# Patient Record
Sex: MALE | Race: WHITE | NOT HISPANIC OR LATINO | Employment: OTHER | URBAN - METROPOLITAN AREA
[De-identification: names, ages, dates, MRNs, and addresses within clinical notes are randomized per-mention and may not be internally consistent; named-entity substitution may affect disease eponyms.]

---

## 2018-09-24 ENCOUNTER — ANESTHESIA EVENT (OUTPATIENT)
Dept: PERIOP | Facility: HOSPITAL | Age: 63
End: 2018-09-24

## 2018-09-24 ENCOUNTER — APPOINTMENT (OUTPATIENT)
Dept: PREADMISSION TESTING | Facility: HOSPITAL | Age: 63
End: 2018-09-24

## 2018-09-24 VITALS — HEIGHT: 68 IN | WEIGHT: 210.98 LBS | BODY MASS INDEX: 31.98 KG/M2

## 2018-09-24 LAB
DEPRECATED RDW RBC AUTO: 49.9 FL (ref 37–54)
ERYTHROCYTE [DISTWIDTH] IN BLOOD BY AUTOMATED COUNT: 14.6 % (ref 11.3–14.5)
HCT VFR BLD AUTO: 35.6 % (ref 38.9–50.9)
HGB BLD-MCNC: 11.2 G/DL (ref 13.1–17.5)
INR PPP: 1.2 (ref 0.91–1.09)
MCH RBC QN AUTO: 29.5 PG (ref 27–31)
MCHC RBC AUTO-ENTMCNC: 31.5 G/DL (ref 32–36)
MCV RBC AUTO: 93.7 FL (ref 80–99)
PLATELET # BLD AUTO: 159 10*3/MM3 (ref 150–450)
PMV BLD AUTO: 10.9 FL (ref 6–12)
POTASSIUM BLD-SCNC: 4.4 MMOL/L (ref 3.5–5.5)
PROTHROMBIN TIME: 12.6 SECONDS (ref 9.6–11.5)
RBC # BLD AUTO: 3.8 10*6/MM3 (ref 4.2–5.76)
WBC NRBC COR # BLD: 5.21 10*3/MM3 (ref 3.5–10.8)

## 2018-09-24 PROCEDURE — 85610 PROTHROMBIN TIME: CPT | Performed by: ORTHOPAEDIC SURGERY

## 2018-09-24 PROCEDURE — 85027 COMPLETE CBC AUTOMATED: CPT | Performed by: ANESTHESIOLOGY

## 2018-09-24 PROCEDURE — 93005 ELECTROCARDIOGRAM TRACING: CPT

## 2018-09-24 PROCEDURE — 84132 ASSAY OF SERUM POTASSIUM: CPT | Performed by: ANESTHESIOLOGY

## 2018-09-24 PROCEDURE — 93010 ELECTROCARDIOGRAM REPORT: CPT | Performed by: INTERNAL MEDICINE

## 2018-09-24 PROCEDURE — 36415 COLL VENOUS BLD VENIPUNCTURE: CPT

## 2018-09-24 RX ORDER — TIZANIDINE 4 MG/1
4 TABLET ORAL 3 TIMES DAILY PRN
COMMUNITY

## 2018-09-24 RX ORDER — ASPIRIN 81 MG/1
81 TABLET ORAL DAILY
COMMUNITY

## 2018-09-24 RX ORDER — LISINOPRIL 10 MG/1
10 TABLET ORAL DAILY
COMMUNITY
End: 2019-06-12 | Stop reason: SDUPTHER

## 2018-09-24 RX ORDER — PANTOPRAZOLE SODIUM 40 MG/1
40 TABLET, DELAYED RELEASE ORAL DAILY
COMMUNITY
End: 2019-06-12 | Stop reason: SDUPTHER

## 2018-09-24 RX ORDER — METHADONE HYDROCHLORIDE 10 MG/1
30 TABLET ORAL EVERY 6 HOURS PRN
COMMUNITY

## 2018-09-24 RX ORDER — GABAPENTIN 600 MG/1
600 TABLET ORAL 4 TIMES DAILY
Status: ON HOLD | COMMUNITY
End: 2019-12-27

## 2018-09-24 RX ORDER — ATORVASTATIN CALCIUM 80 MG/1
80 TABLET, FILM COATED ORAL NIGHTLY
COMMUNITY
End: 2019-06-12 | Stop reason: SDUPTHER

## 2018-09-24 RX ORDER — WARFARIN SODIUM 7.5 MG/1
7.5 TABLET ORAL
Status: ON HOLD | COMMUNITY
End: 2018-09-26

## 2018-09-24 RX ORDER — WARFARIN SODIUM 4 MG/1
4 TABLET ORAL
Status: ON HOLD | COMMUNITY
End: 2018-09-26

## 2018-09-24 RX ORDER — FAMOTIDINE 10 MG/ML
20 INJECTION, SOLUTION INTRAVENOUS ONCE
Status: CANCELLED | OUTPATIENT
Start: 2018-09-24 | End: 2018-09-24

## 2018-09-25 ENCOUNTER — ANESTHESIA (OUTPATIENT)
Dept: PERIOP | Facility: HOSPITAL | Age: 63
End: 2018-09-25

## 2018-09-25 ENCOUNTER — HOSPITAL ENCOUNTER (INPATIENT)
Facility: HOSPITAL | Age: 63
LOS: 1 days | Discharge: HOME | End: 2018-09-26
Attending: ORTHOPAEDIC SURGERY | Admitting: ORTHOPAEDIC SURGERY

## 2018-09-25 ENCOUNTER — APPOINTMENT (OUTPATIENT)
Dept: GENERAL RADIOLOGY | Facility: HOSPITAL | Age: 63
End: 2018-09-25

## 2018-09-25 PROBLEM — M50.11 DISORDER OF INTERVERTEBRAL DISC OF HIGH CERVICAL REGION WITH RADICULOPATHY: Status: ACTIVE | Noted: 2018-09-25

## 2018-09-25 PROBLEM — E78.5 HLD (HYPERLIPIDEMIA): Status: ACTIVE | Noted: 2018-09-25

## 2018-09-25 PROBLEM — M54.2 NECK PAIN: Status: ACTIVE | Noted: 2018-09-25

## 2018-09-25 PROBLEM — I10 HTN (HYPERTENSION): Status: ACTIVE | Noted: 2018-09-25

## 2018-09-25 PROBLEM — D64.9 ANEMIA: Status: ACTIVE | Noted: 2018-09-25

## 2018-09-25 PROBLEM — J44.9 COPD (CHRONIC OBSTRUCTIVE PULMONARY DISEASE) (HCC): Status: ACTIVE | Noted: 2018-09-25

## 2018-09-25 PROBLEM — G47.33 OSA TREATED WITH BIPAP: Status: ACTIVE | Noted: 2018-09-25

## 2018-09-25 PROBLEM — Z86.718 HISTORY OF DVT (DEEP VEIN THROMBOSIS): Status: ACTIVE | Noted: 2018-09-25

## 2018-09-25 PROBLEM — Z98.1 S/P CERVICAL SPINAL FUSION: Status: ACTIVE | Noted: 2018-09-25

## 2018-09-25 PROBLEM — E11.9 DM (DIABETES MELLITUS) (HCC): Status: ACTIVE | Noted: 2018-09-25

## 2018-09-25 LAB
GLUCOSE BLDC GLUCOMTR-MCNC: 124 MG/DL (ref 70–130)
GLUCOSE BLDC GLUCOMTR-MCNC: 128 MG/DL (ref 70–130)
GLUCOSE BLDC GLUCOMTR-MCNC: 198 MG/DL (ref 70–130)
GLUCOSE BLDC GLUCOMTR-MCNC: 205 MG/DL (ref 70–130)
HBA1C MFR BLD: 7.6 % (ref 4.8–5.6)
INR PPP: 1.13 (ref 0.91–1.09)
PROTHROMBIN TIME: 11.9 SECONDS (ref 9.6–11.5)

## 2018-09-25 PROCEDURE — 25010000003 CEFAZOLIN IN DEXTROSE 2-4 GM/100ML-% SOLUTION: Performed by: ORTHOPAEDIC SURGERY

## 2018-09-25 PROCEDURE — 25810000003 SODIUM CHLORIDE 0.9 % WITH KCL 20 MEQ 20-0.9 MEQ/L-% SOLUTION: Performed by: ORTHOPAEDIC SURGERY

## 2018-09-25 PROCEDURE — C1713 ANCHOR/SCREW BN/BN,TIS/BN: HCPCS | Performed by: ORTHOPAEDIC SURGERY

## 2018-09-25 PROCEDURE — 25010000002 PROPOFOL 10 MG/ML EMULSION: Performed by: ANESTHESIOLOGY

## 2018-09-25 PROCEDURE — 63710000001 DEXAMETHASONE PER 0.25 MG: Performed by: ORTHOPAEDIC SURGERY

## 2018-09-25 PROCEDURE — 25010000002 FENTANYL CITRATE (PF) 100 MCG/2ML SOLUTION: Performed by: ANESTHESIOLOGY

## 2018-09-25 PROCEDURE — L0120 CERV FLEX N/ADJ FOAM PRE OTS: HCPCS | Performed by: ORTHOPAEDIC SURGERY

## 2018-09-25 PROCEDURE — 63710000001 INSULIN LISPRO (HUMAN) PER 5 UNITS: Performed by: NURSE PRACTITIONER

## 2018-09-25 PROCEDURE — 0RG10A0 FUSION OF CERVICAL VERTEBRAL JOINT WITH INTERBODY FUSION DEVICE, ANTERIOR APPROACH, ANTERIOR COLUMN, OPEN APPROACH: ICD-10-PCS | Performed by: ORTHOPAEDIC SURGERY

## 2018-09-25 PROCEDURE — 82962 GLUCOSE BLOOD TEST: CPT

## 2018-09-25 PROCEDURE — 0RB30ZZ EXCISION OF CERVICAL VERTEBRAL DISC, OPEN APPROACH: ICD-10-PCS | Performed by: ORTHOPAEDIC SURGERY

## 2018-09-25 PROCEDURE — 25010000002 DEXAMETHASONE PER 1 MG: Performed by: ANESTHESIOLOGY

## 2018-09-25 PROCEDURE — 25010000002 PROPOFOL 1000 MG/ML EMULSION: Performed by: ANESTHESIOLOGY

## 2018-09-25 PROCEDURE — 25010000002 HYDROMORPHONE PER 4 MG: Performed by: ANESTHESIOLOGY

## 2018-09-25 PROCEDURE — 25010000002 HYDROMORPHONE PER 4 MG: Performed by: ORTHOPAEDIC SURGERY

## 2018-09-25 PROCEDURE — 25010000002 NEOSTIGMINE 10 MG/10ML SOLUTION: Performed by: ANESTHESIOLOGY

## 2018-09-25 PROCEDURE — 83036 HEMOGLOBIN GLYCOSYLATED A1C: CPT | Performed by: NURSE PRACTITIONER

## 2018-09-25 PROCEDURE — 72020 X-RAY EXAM OF SPINE 1 VIEW: CPT

## 2018-09-25 PROCEDURE — 85610 PROTHROMBIN TIME: CPT | Performed by: ORTHOPAEDIC SURGERY

## 2018-09-25 DEVICE — BENGAL SYSTEM LARGE IMPLANT 5MM, 7 DEGREES
Type: IMPLANTABLE DEVICE | Site: SPINE CERVICAL | Status: FUNCTIONAL
Brand: BENGAL

## 2018-09-25 DEVICE — ALLOGRFT BONE VIVIGEN CELLUAR MATRX FORMABLE 1CC: Type: IMPLANTABLE DEVICE | Site: SPINE CERVICAL | Status: FUNCTIONAL

## 2018-09-25 DEVICE — SCRW SKYLINE VAR SD 16MM: Type: IMPLANTABLE DEVICE | Site: SPINE CERVICAL | Status: FUNCTIONAL

## 2018-09-25 DEVICE — PLT SKYLINE 1LEVEL 14MM: Type: IMPLANTABLE DEVICE | Site: SPINE CERVICAL | Status: FUNCTIONAL

## 2018-09-25 RX ORDER — ONDANSETRON 2 MG/ML
4 INJECTION INTRAMUSCULAR; INTRAVENOUS ONCE AS NEEDED
Status: DISCONTINUED | OUTPATIENT
Start: 2018-09-25 | End: 2018-09-25 | Stop reason: HOSPADM

## 2018-09-25 RX ORDER — FAMOTIDINE 20 MG/1
20 TABLET, FILM COATED ORAL ONCE
Status: COMPLETED | OUTPATIENT
Start: 2018-09-25 | End: 2018-09-25

## 2018-09-25 RX ORDER — ATORVASTATIN CALCIUM 40 MG/1
80 TABLET, FILM COATED ORAL NIGHTLY
Status: DISCONTINUED | OUTPATIENT
Start: 2018-09-25 | End: 2018-09-26 | Stop reason: HOSPADM

## 2018-09-25 RX ORDER — SODIUM CHLORIDE, SODIUM LACTATE, POTASSIUM CHLORIDE, CALCIUM CHLORIDE 600; 310; 30; 20 MG/100ML; MG/100ML; MG/100ML; MG/100ML
9 INJECTION, SOLUTION INTRAVENOUS CONTINUOUS
Status: DISCONTINUED | OUTPATIENT
Start: 2018-09-25 | End: 2018-09-26 | Stop reason: HOSPADM

## 2018-09-25 RX ORDER — LABETALOL HYDROCHLORIDE 5 MG/ML
10 INJECTION, SOLUTION INTRAVENOUS EVERY 4 HOURS PRN
Status: DISCONTINUED | OUTPATIENT
Start: 2018-09-25 | End: 2018-09-26 | Stop reason: HOSPADM

## 2018-09-25 RX ORDER — CEFAZOLIN SODIUM 2 G/100ML
2 INJECTION, SOLUTION INTRAVENOUS EVERY 8 HOURS
Status: COMPLETED | OUTPATIENT
Start: 2018-09-25 | End: 2018-09-26

## 2018-09-25 RX ORDER — LISINOPRIL 10 MG/1
10 TABLET ORAL DAILY
Status: DISCONTINUED | OUTPATIENT
Start: 2018-09-26 | End: 2018-09-26 | Stop reason: HOSPADM

## 2018-09-25 RX ORDER — GLYCOPYRROLATE 0.2 MG/ML
INJECTION INTRAMUSCULAR; INTRAVENOUS AS NEEDED
Status: DISCONTINUED | OUTPATIENT
Start: 2018-09-25 | End: 2018-09-25 | Stop reason: SURG

## 2018-09-25 RX ORDER — PANTOPRAZOLE SODIUM 40 MG/1
40 TABLET, DELAYED RELEASE ORAL DAILY
Status: DISCONTINUED | OUTPATIENT
Start: 2018-09-25 | End: 2018-09-26 | Stop reason: HOSPADM

## 2018-09-25 RX ORDER — ZOLPIDEM TARTRATE 5 MG/1
5 TABLET ORAL NIGHTLY PRN
Status: DISCONTINUED | OUTPATIENT
Start: 2018-09-25 | End: 2018-09-26 | Stop reason: HOSPADM

## 2018-09-25 RX ORDER — ROCURONIUM BROMIDE 10 MG/ML
INJECTION, SOLUTION INTRAVENOUS AS NEEDED
Status: DISCONTINUED | OUTPATIENT
Start: 2018-09-25 | End: 2018-09-25 | Stop reason: SURG

## 2018-09-25 RX ORDER — GABAPENTIN 300 MG/1
600 CAPSULE ORAL 4 TIMES DAILY
Status: DISCONTINUED | OUTPATIENT
Start: 2018-09-25 | End: 2018-09-26 | Stop reason: HOSPADM

## 2018-09-25 RX ORDER — ACETAMINOPHEN 325 MG/1
650 TABLET ORAL EVERY 4 HOURS PRN
Status: DISCONTINUED | OUTPATIENT
Start: 2018-09-25 | End: 2018-09-26 | Stop reason: HOSPADM

## 2018-09-25 RX ORDER — BISACODYL 10 MG
10 SUPPOSITORY, RECTAL RECTAL DAILY PRN
Status: DISCONTINUED | OUTPATIENT
Start: 2018-09-25 | End: 2018-09-26 | Stop reason: HOSPADM

## 2018-09-25 RX ORDER — SODIUM CHLORIDE 0.9 % (FLUSH) 0.9 %
1-10 SYRINGE (ML) INJECTION AS NEEDED
Status: DISCONTINUED | OUTPATIENT
Start: 2018-09-25 | End: 2018-09-26 | Stop reason: HOSPADM

## 2018-09-25 RX ORDER — HYDROCODONE BITARTRATE AND ACETAMINOPHEN 7.5; 325 MG/1; MG/1
1 TABLET ORAL EVERY 4 HOURS PRN
Status: DISCONTINUED | OUTPATIENT
Start: 2018-09-25 | End: 2018-09-26 | Stop reason: HOSPADM

## 2018-09-25 RX ORDER — BENZOIN/ALOE VERA/STORAX/TOLU 10-2-8-4%
TINCTURE TOPICAL AS NEEDED
Status: DISCONTINUED | OUTPATIENT
Start: 2018-09-25 | End: 2018-09-25 | Stop reason: HOSPADM

## 2018-09-25 RX ORDER — BISACODYL 5 MG/1
5 TABLET, DELAYED RELEASE ORAL DAILY PRN
Status: DISCONTINUED | OUTPATIENT
Start: 2018-09-25 | End: 2018-09-26 | Stop reason: HOSPADM

## 2018-09-25 RX ORDER — PROMETHAZINE HYDROCHLORIDE 25 MG/1
25 TABLET ORAL ONCE AS NEEDED
Status: DISCONTINUED | OUTPATIENT
Start: 2018-09-25 | End: 2018-09-25 | Stop reason: HOSPADM

## 2018-09-25 RX ORDER — FENTANYL CITRATE 50 UG/ML
INJECTION, SOLUTION INTRAMUSCULAR; INTRAVENOUS AS NEEDED
Status: DISCONTINUED | OUTPATIENT
Start: 2018-09-25 | End: 2018-09-25 | Stop reason: SURG

## 2018-09-25 RX ORDER — HYDROMORPHONE HYDROCHLORIDE 1 MG/ML
0.5 INJECTION, SOLUTION INTRAMUSCULAR; INTRAVENOUS; SUBCUTANEOUS
Status: DISCONTINUED | OUTPATIENT
Start: 2018-09-25 | End: 2018-09-25 | Stop reason: HOSPADM

## 2018-09-25 RX ORDER — ONDANSETRON 2 MG/ML
4 INJECTION INTRAMUSCULAR; INTRAVENOUS EVERY 6 HOURS PRN
Status: DISCONTINUED | OUTPATIENT
Start: 2018-09-25 | End: 2018-09-26 | Stop reason: HOSPADM

## 2018-09-25 RX ORDER — FENTANYL CITRATE 50 UG/ML
50 INJECTION, SOLUTION INTRAMUSCULAR; INTRAVENOUS
Status: DISCONTINUED | OUTPATIENT
Start: 2018-09-25 | End: 2018-09-25 | Stop reason: HOSPADM

## 2018-09-25 RX ORDER — DEXAMETHASONE SODIUM PHOSPHATE 10 MG/ML
INJECTION INTRAMUSCULAR; INTRAVENOUS AS NEEDED
Status: DISCONTINUED | OUTPATIENT
Start: 2018-09-25 | End: 2018-09-25 | Stop reason: SURG

## 2018-09-25 RX ORDER — PROMETHAZINE HYDROCHLORIDE 25 MG/ML
6.25 INJECTION, SOLUTION INTRAMUSCULAR; INTRAVENOUS ONCE AS NEEDED
Status: DISCONTINUED | OUTPATIENT
Start: 2018-09-25 | End: 2018-09-25 | Stop reason: HOSPADM

## 2018-09-25 RX ORDER — SODIUM CHLORIDE AND POTASSIUM CHLORIDE 150; 900 MG/100ML; MG/100ML
100 INJECTION, SOLUTION INTRAVENOUS CONTINUOUS
Status: DISCONTINUED | OUTPATIENT
Start: 2018-09-25 | End: 2018-09-26 | Stop reason: HOSPADM

## 2018-09-25 RX ORDER — LIDOCAINE HYDROCHLORIDE 10 MG/ML
0.5 INJECTION, SOLUTION EPIDURAL; INFILTRATION; INTRACAUDAL; PERINEURAL ONCE AS NEEDED
Status: COMPLETED | OUTPATIENT
Start: 2018-09-25 | End: 2018-09-25

## 2018-09-25 RX ORDER — MAGNESIUM HYDROXIDE 1200 MG/15ML
LIQUID ORAL AS NEEDED
Status: DISCONTINUED | OUTPATIENT
Start: 2018-09-25 | End: 2018-09-25 | Stop reason: HOSPADM

## 2018-09-25 RX ORDER — DEXTROSE MONOHYDRATE 25 G/50ML
25 INJECTION, SOLUTION INTRAVENOUS
Status: DISCONTINUED | OUTPATIENT
Start: 2018-09-25 | End: 2018-09-26 | Stop reason: HOSPADM

## 2018-09-25 RX ORDER — DEXAMETHASONE SODIUM PHOSPHATE 4 MG/ML
4 INJECTION, SOLUTION INTRA-ARTICULAR; INTRALESIONAL; INTRAMUSCULAR; INTRAVENOUS; SOFT TISSUE EVERY 6 HOURS SCHEDULED
Status: DISCONTINUED | OUTPATIENT
Start: 2018-09-25 | End: 2018-09-26 | Stop reason: HOSPADM

## 2018-09-25 RX ORDER — CEFAZOLIN SODIUM 2 G/100ML
2 INJECTION, SOLUTION INTRAVENOUS ONCE
Status: DISCONTINUED | OUTPATIENT
Start: 2018-09-25 | End: 2018-09-25 | Stop reason: HOSPADM

## 2018-09-25 RX ORDER — NEOSTIGMINE METHYLSULFATE 1 MG/ML
INJECTION, SOLUTION INTRAVENOUS AS NEEDED
Status: DISCONTINUED | OUTPATIENT
Start: 2018-09-25 | End: 2018-09-25 | Stop reason: SURG

## 2018-09-25 RX ORDER — FAMOTIDINE 20 MG/1
20 TABLET, FILM COATED ORAL EVERY 12 HOURS SCHEDULED
Status: DISCONTINUED | OUTPATIENT
Start: 2018-09-25 | End: 2018-09-26 | Stop reason: HOSPADM

## 2018-09-25 RX ORDER — SODIUM CHLORIDE 0.9 % (FLUSH) 0.9 %
1-10 SYRINGE (ML) INJECTION AS NEEDED
Status: DISCONTINUED | OUTPATIENT
Start: 2018-09-25 | End: 2018-09-25 | Stop reason: HOSPADM

## 2018-09-25 RX ORDER — METHADONE HYDROCHLORIDE 10 MG/1
30 TABLET ORAL ONCE
Status: COMPLETED | OUTPATIENT
Start: 2018-09-25 | End: 2018-09-25

## 2018-09-25 RX ORDER — PROPOFOL 10 MG/ML
VIAL (ML) INTRAVENOUS AS NEEDED
Status: DISCONTINUED | OUTPATIENT
Start: 2018-09-25 | End: 2018-09-25 | Stop reason: SURG

## 2018-09-25 RX ORDER — NALOXONE HCL 0.4 MG/ML
0.4 VIAL (ML) INJECTION
Status: DISCONTINUED | OUTPATIENT
Start: 2018-09-25 | End: 2018-09-26 | Stop reason: HOSPADM

## 2018-09-25 RX ORDER — FAMOTIDINE 10 MG/ML
20 INJECTION, SOLUTION INTRAVENOUS EVERY 12 HOURS SCHEDULED
Status: DISCONTINUED | OUTPATIENT
Start: 2018-09-25 | End: 2018-09-26 | Stop reason: HOSPADM

## 2018-09-25 RX ORDER — PROMETHAZINE HYDROCHLORIDE 25 MG/1
25 SUPPOSITORY RECTAL ONCE AS NEEDED
Status: DISCONTINUED | OUTPATIENT
Start: 2018-09-25 | End: 2018-09-25 | Stop reason: HOSPADM

## 2018-09-25 RX ORDER — GLYCOPYRROLATE 0.2 MG/ML
INJECTION INTRAMUSCULAR; INTRAVENOUS AS NEEDED
Status: DISCONTINUED | OUTPATIENT
Start: 2018-09-25 | End: 2018-09-25

## 2018-09-25 RX ORDER — NICOTINE POLACRILEX 4 MG
15 LOZENGE BUCCAL
Status: DISCONTINUED | OUTPATIENT
Start: 2018-09-25 | End: 2018-09-26 | Stop reason: HOSPADM

## 2018-09-25 RX ORDER — DEXAMETHASONE 4 MG/1
4 TABLET ORAL EVERY 6 HOURS SCHEDULED
Status: DISCONTINUED | OUTPATIENT
Start: 2018-09-25 | End: 2018-09-26 | Stop reason: HOSPADM

## 2018-09-25 RX ADMIN — FAMOTIDINE 20 MG: 20 TABLET ORAL at 07:14

## 2018-09-25 RX ADMIN — GLYCOPYRROLATE 0.8 MG: 0.2 INJECTION, SOLUTION INTRAMUSCULAR; INTRAVENOUS at 11:20

## 2018-09-25 RX ADMIN — SODIUM CHLORIDE, POTASSIUM CHLORIDE, SODIUM LACTATE AND CALCIUM CHLORIDE: 600; 310; 30; 20 INJECTION, SOLUTION INTRAVENOUS at 10:25

## 2018-09-25 RX ADMIN — HYDROCODONE BITARTRATE AND ACETAMINOPHEN 1 TABLET: 7.5; 325 TABLET ORAL at 16:02

## 2018-09-25 RX ADMIN — CEFAZOLIN SODIUM 2 G: 2 INJECTION, SOLUTION INTRAVENOUS at 17:46

## 2018-09-25 RX ADMIN — METOPROLOL TARTRATE 25 MG: 25 TABLET ORAL at 21:33

## 2018-09-25 RX ADMIN — DEXAMETHASONE 4 MG: 4 TABLET ORAL at 17:46

## 2018-09-25 RX ADMIN — INSULIN LISPRO 4 UNITS: 100 INJECTION, SOLUTION INTRAVENOUS; SUBCUTANEOUS at 17:46

## 2018-09-25 RX ADMIN — HYDROCODONE BITARTRATE AND ACETAMINOPHEN 1 TABLET: 7.5; 325 TABLET ORAL at 21:33

## 2018-09-25 RX ADMIN — PROPOFOL 200 MG: 10 INJECTION, EMULSION INTRAVENOUS at 10:25

## 2018-09-25 RX ADMIN — FENTANYL CITRATE 50 MCG: 50 INJECTION INTRAMUSCULAR; INTRAVENOUS at 12:30

## 2018-09-25 RX ADMIN — FAMOTIDINE 20 MG: 20 TABLET, FILM COATED ORAL at 21:33

## 2018-09-25 RX ADMIN — HYDROMORPHONE HYDROCHLORIDE 0.5 MG: 1 INJECTION, SOLUTION INTRAMUSCULAR; INTRAVENOUS; SUBCUTANEOUS at 13:23

## 2018-09-25 RX ADMIN — PANTOPRAZOLE SODIUM 40 MG: 40 TABLET, DELAYED RELEASE ORAL at 16:02

## 2018-09-25 RX ADMIN — HYDROMORPHONE HYDROCHLORIDE 2 MG: 1 INJECTION, SOLUTION INTRAMUSCULAR; INTRAVENOUS; SUBCUTANEOUS at 21:55

## 2018-09-25 RX ADMIN — ATORVASTATIN CALCIUM 80 MG: 40 TABLET, FILM COATED ORAL at 21:33

## 2018-09-25 RX ADMIN — FENTANYL CITRATE 50 MCG: 50 INJECTION INTRAMUSCULAR; INTRAVENOUS at 12:49

## 2018-09-25 RX ADMIN — PROPOFOL 25 MCG/KG/MIN: 10 INJECTION, EMULSION INTRAVENOUS at 10:36

## 2018-09-25 RX ADMIN — NEOSTIGMINE METHYLSULFATE 3.5 MG: 1 INJECTION, SOLUTION INTRAVENOUS at 11:20

## 2018-09-25 RX ADMIN — POTASSIUM CHLORIDE AND SODIUM CHLORIDE 100 ML/HR: 900; 150 INJECTION, SOLUTION INTRAVENOUS at 13:27

## 2018-09-25 RX ADMIN — METHADONE HYDROCHLORIDE 30 MG: 10 TABLET ORAL at 12:48

## 2018-09-25 RX ADMIN — FENTANYL CITRATE 100 MCG: 50 INJECTION, SOLUTION INTRAMUSCULAR; INTRAVENOUS at 10:25

## 2018-09-25 RX ADMIN — POTASSIUM CHLORIDE AND SODIUM CHLORIDE 100 ML/HR: 900; 150 INJECTION, SOLUTION INTRAVENOUS at 22:19

## 2018-09-25 RX ADMIN — GABAPENTIN 600 MG: 300 CAPSULE ORAL at 17:46

## 2018-09-25 RX ADMIN — BISACODYL 5 MG: 5 TABLET, COATED ORAL at 21:32

## 2018-09-25 RX ADMIN — DEXAMETHASONE SODIUM PHOSPHATE 8 MG: 10 INJECTION INTRAMUSCULAR; INTRAVENOUS at 10:25

## 2018-09-25 RX ADMIN — LIDOCAINE HYDROCHLORIDE 0.2 ML: 10 INJECTION, SOLUTION EPIDURAL; INFILTRATION; INTRACAUDAL; PERINEURAL at 07:14

## 2018-09-25 RX ADMIN — INSULIN LISPRO 2 UNITS: 100 INJECTION, SOLUTION INTRAVENOUS; SUBCUTANEOUS at 22:17

## 2018-09-25 RX ADMIN — SODIUM CHLORIDE, POTASSIUM CHLORIDE, SODIUM LACTATE AND CALCIUM CHLORIDE 9 ML/HR: 600; 310; 30; 20 INJECTION, SOLUTION INTRAVENOUS at 07:14

## 2018-09-25 RX ADMIN — GABAPENTIN 600 MG: 300 CAPSULE ORAL at 21:32

## 2018-09-25 RX ADMIN — ROCURONIUM BROMIDE 50 MG: 10 SOLUTION INTRAVENOUS at 10:25

## 2018-09-25 NOTE — ANESTHESIA PREPROCEDURE EVALUATION
Anesthesia Evaluation     Patient summary reviewed and Nursing notes reviewed   NPO Solid Status: > 8 hours  NPO Liquid Status: > 6 hours           Airway   Mallampati: III  TM distance: >3 FB  Neck ROM: limited  Possible difficult intubation  Dental    (+) lower dentures and upper dentures    Pulmonary     breath sounds clear to auscultation  (+) COPD, sleep apnea,   Cardiovascular     ECG reviewed  Rhythm: regular  Rate: normal    (+) hypertension, cardiac stents more than 12 months ago DVT,       Neuro/Psych  (+) dizziness/light headedness, numbness,     GI/Hepatic/Renal/Endo    (+)  GERD,  diabetes mellitus,     Musculoskeletal     (+) back pain, neck pain,   Abdominal    Substance History      OB/GYN          Other   (+) arthritis     ROS/Med Hx Other: Myocardial stress 2016 shows fixed defect with no reversible perfusion deficit and LVEF 42%                  Anesthesia Plan    ASA 3     general     intravenous induction   Anesthetic plan, all risks, benefits, and alternatives have been provided, discussed and informed consent has been obtained with: patient.    Plan discussed with CRNA.

## 2018-09-25 NOTE — ANESTHESIA POSTPROCEDURE EVALUATION
Patient: Gene Bond    Procedure Summary     Date:  09/25/18 Room / Location:   [facility]  /  [facility]    Anesthesia Start:  1019 Anesthesia Stop:  1140    Procedure:  CERVICAL DISCECTOMY ANTERIOR WITH FUSION C4-5 WITH CAGE AND ANTERIOR PLATING (N/A Spine Cervical) Diagnosis:      Surgeon:  Aleksey Suarez MD Provider:  Golden Mitchell MD    Anesthesia Type:  general ASA Status:  3          Anesthesia Type: general  Last vitals  BP   140/60 (09/25/18 1136)   Temp   97.1 °F (36.2 °C) (09/25/18 1136)   Pulse   72 (09/25/18 1136)   Resp   20 (09/25/18 1136)     SpO2   98 % (09/25/18 1136)     Post Anesthesia Care and Evaluation    Patient location during evaluation: PACU  Patient participation: complete - patient participated  Level of consciousness: awake and alert  Pain score: 0  Pain management: adequate  Airway patency: patent  Anesthetic complications: No anesthetic complications  PONV Status: none  Cardiovascular status: hemodynamically stable and acceptable  Respiratory status: nonlabored ventilation, acceptable and nasal cannula  Hydration status: acceptable

## 2018-09-25 NOTE — ANESTHESIA PROCEDURE NOTES
Airway  Urgency: elective    Airway not difficult    General Information and Staff    Patient location during procedure: OR    Indications and Patient Condition  Indications for airway management: airway protection    Preoxygenated: yes  MILS not maintained throughout  Mask difficulty assessment: 1 - vent by mask    Final Airway Details  Final airway type: endotracheal airway      Successful airway: ETT  Cuffed: yes   Successful intubation technique: direct laryngoscopy  Endotracheal tube insertion site: oral  Blade: Enma  Blade size: 4  ETT size: 7.0 mm  Cormack-Lehane Classification: grade I - full view of glottis  Placement verified by: chest auscultation and capnometry   Measured from: lips  ETT to lips (cm): 20  Number of attempts at approach: 1    Additional Comments  Negative epigastric sounds, Breath sound equal bilaterally with symmetric chest rise and fall

## 2018-09-26 VITALS
SYSTOLIC BLOOD PRESSURE: 108 MMHG | HEIGHT: 68 IN | RESPIRATION RATE: 18 BRPM | HEART RATE: 56 BPM | OXYGEN SATURATION: 97 % | WEIGHT: 210.98 LBS | TEMPERATURE: 97.5 F | DIASTOLIC BLOOD PRESSURE: 89 MMHG | BODY MASS INDEX: 31.98 KG/M2

## 2018-09-26 PROBLEM — G89.18 ACUTE POSTOPERATIVE PAIN: Status: ACTIVE | Noted: 2018-09-26

## 2018-09-26 LAB
ANION GAP SERPL CALCULATED.3IONS-SCNC: 15 MMOL/L (ref 3–11)
BUN BLD-MCNC: 15 MG/DL (ref 9–23)
BUN/CREAT SERPL: 16.9 (ref 7–25)
CALCIUM SPEC-SCNC: 8.7 MG/DL (ref 8.7–10.4)
CHLORIDE SERPL-SCNC: 99 MMOL/L (ref 99–109)
CO2 SERPL-SCNC: 27 MMOL/L (ref 20–31)
CREAT BLD-MCNC: 0.89 MG/DL (ref 0.6–1.3)
DEPRECATED RDW RBC AUTO: 49.6 FL (ref 37–54)
ERYTHROCYTE [DISTWIDTH] IN BLOOD BY AUTOMATED COUNT: 14.8 % (ref 11.3–14.5)
GFR SERPL CREATININE-BSD FRML MDRD: 86 ML/MIN/1.73
GLUCOSE BLD-MCNC: 190 MG/DL (ref 70–100)
GLUCOSE BLDC GLUCOMTR-MCNC: 191 MG/DL (ref 70–130)
GLUCOSE BLDC GLUCOMTR-MCNC: 215 MG/DL (ref 70–130)
HCT VFR BLD AUTO: 31.3 % (ref 38.9–50.9)
HGB BLD-MCNC: 10.2 G/DL (ref 13.1–17.5)
MCH RBC QN AUTO: 29.9 PG (ref 27–31)
MCHC RBC AUTO-ENTMCNC: 32.6 G/DL (ref 32–36)
MCV RBC AUTO: 91.8 FL (ref 80–99)
PLATELET # BLD AUTO: 162 10*3/MM3 (ref 150–450)
PMV BLD AUTO: 11.9 FL (ref 6–12)
POTASSIUM BLD-SCNC: 4.3 MMOL/L (ref 3.5–5.5)
RBC # BLD AUTO: 3.41 10*6/MM3 (ref 4.2–5.76)
SODIUM BLD-SCNC: 141 MMOL/L (ref 132–146)
WBC NRBC COR # BLD: 7.11 10*3/MM3 (ref 3.5–10.8)

## 2018-09-26 PROCEDURE — 82962 GLUCOSE BLOOD TEST: CPT

## 2018-09-26 PROCEDURE — 80048 BASIC METABOLIC PNL TOTAL CA: CPT | Performed by: NURSE PRACTITIONER

## 2018-09-26 PROCEDURE — 85027 COMPLETE CBC AUTOMATED: CPT | Performed by: NURSE PRACTITIONER

## 2018-09-26 PROCEDURE — 25010000003 CEFAZOLIN IN DEXTROSE 2-4 GM/100ML-% SOLUTION: Performed by: ORTHOPAEDIC SURGERY

## 2018-09-26 PROCEDURE — 63710000001 DEXAMETHASONE PER 0.25 MG: Performed by: ORTHOPAEDIC SURGERY

## 2018-09-26 RX ORDER — WARFARIN SODIUM 4 MG/1
4 TABLET ORAL
Start: 2018-09-26 | End: 2019-06-25 | Stop reason: SDUPTHER

## 2018-09-26 RX ORDER — WARFARIN SODIUM 7.5 MG/1
7.5 TABLET ORAL
Start: 2018-09-26 | End: 2019-05-21 | Stop reason: SDUPTHER

## 2018-09-26 RX ORDER — DOCUSATE SODIUM 100 MG/1
100 CAPSULE, LIQUID FILLED ORAL 2 TIMES DAILY
Qty: 60 CAPSULE | Refills: 0 | Status: ON HOLD | OUTPATIENT
Start: 2018-09-26 | End: 2019-12-27

## 2018-09-26 RX ADMIN — DEXAMETHASONE 4 MG: 4 TABLET ORAL at 05:09

## 2018-09-26 RX ADMIN — CEFAZOLIN SODIUM 2 G: 2 INJECTION, SOLUTION INTRAVENOUS at 00:46

## 2018-09-26 RX ADMIN — DEXAMETHASONE 4 MG: 4 TABLET ORAL at 12:22

## 2018-09-26 RX ADMIN — GABAPENTIN 600 MG: 300 CAPSULE ORAL at 09:38

## 2018-09-26 RX ADMIN — LISINOPRIL 10 MG: 10 TABLET ORAL at 09:38

## 2018-09-26 RX ADMIN — PANTOPRAZOLE SODIUM 40 MG: 40 TABLET, DELAYED RELEASE ORAL at 09:38

## 2018-09-26 RX ADMIN — METOPROLOL TARTRATE 25 MG: 25 TABLET ORAL at 09:38

## 2018-09-26 RX ADMIN — INSULIN LISPRO 4 UNITS: 100 INJECTION, SOLUTION INTRAVENOUS; SUBCUTANEOUS at 12:22

## 2018-09-26 RX ADMIN — GABAPENTIN 600 MG: 300 CAPSULE ORAL at 12:22

## 2018-09-26 RX ADMIN — INSULIN LISPRO 2 UNITS: 100 INJECTION, SOLUTION INTRAVENOUS; SUBCUTANEOUS at 09:38

## 2018-09-26 RX ADMIN — HYDROCODONE BITARTRATE AND ACETAMINOPHEN 1 TABLET: 7.5; 325 TABLET ORAL at 09:38

## 2018-09-26 RX ADMIN — HYDROCODONE BITARTRATE AND ACETAMINOPHEN 1 TABLET: 7.5; 325 TABLET ORAL at 05:09

## 2018-09-26 RX ADMIN — FAMOTIDINE 20 MG: 20 TABLET, FILM COATED ORAL at 09:38

## 2018-09-26 RX ADMIN — DEXAMETHASONE 4 MG: 4 TABLET ORAL at 00:46

## 2018-09-27 ENCOUNTER — READMISSION MANAGEMENT (OUTPATIENT)
Dept: CALL CENTER | Facility: HOSPITAL | Age: 63
End: 2018-09-27

## 2018-09-27 NOTE — OUTREACH NOTE
Prep Survey      Responses   Facility patient discharged from?  [place]   Is patient eligible?  Yes   Discharge diagnosis  S/P cervical spinal fusion   Does the patient have one of the following disease processes/diagnoses(primary or secondary)?  Other   Does the patient have Home health ordered?  No   Is there a DME ordered?  No   Medication alerts for this patient  Coumadin   Prep survey completed?  Yes          Ashlee Arguello RN

## 2018-09-29 ENCOUNTER — READMISSION MANAGEMENT (OUTPATIENT)
Dept: CALL CENTER | Facility: HOSPITAL | Age: 63
End: 2018-09-29

## 2018-09-29 NOTE — OUTREACH NOTE
Medical Week 1 Survey      Responses   Facility patient discharged from?  [place]   Does the patient have one of the following disease processes/diagnoses(primary or secondary)?  Other   Is there a successful TCM telephone encounter documented?  No   Week 1 attempt successful?  No   Unsuccessful attempts  Attempt 1          Yovani Burgess RN

## 2018-10-01 ENCOUNTER — READMISSION MANAGEMENT (OUTPATIENT)
Dept: CALL CENTER | Facility: HOSPITAL | Age: 63
End: 2018-10-01

## 2018-10-01 NOTE — OUTREACH NOTE
Medical Week 1 Survey      Responses   Facility patient discharged from?  [place]   Does the patient have one of the following disease processes/diagnoses(primary or secondary)?  General Surgery   Is there a successful TCM telephone encounter documented?  No   Week 1 attempt successful?  No   Unsuccessful attempts  Attempt 2          Minerva Mendieta RN

## 2018-10-03 ENCOUNTER — READMISSION MANAGEMENT (OUTPATIENT)
Dept: CALL CENTER | Facility: HOSPITAL | Age: 63
End: 2018-10-03

## 2018-10-03 NOTE — OUTREACH NOTE
Medical Week 1 Survey      Responses   Facility patient discharged from?  [place]   Does the patient have one of the following disease processes/diagnoses(primary or secondary)?  General Surgery   Is there a successful TCM telephone encounter documented?  No   Week 1 attempt successful?  Yes   Call start time  1546   Call end time  1552   Discharge diagnosis  S/P cervical spinal fusion   Is patient permission given to speak with other caregiver?  Yes   List who call center can speak with  WifeNunu   Medication alerts for this patient  Resumed coumadin 10/1/18   Meds reviewed with patient/caregiver?  Yes   Is the patient having any side effects they believe may be caused by any medication additions or changes?  No   Does the patient have all medications ordered at discharge?  Yes   Is the patient taking all medications as directed (includes completed medication regime)?  Yes   Comments regarding appointments  To see Dr. Franklin for appt 10/3   Does the patient have a primary care provider?   No   PCP Nursing Intervention  Provided number to obtain PCP   Does the patient have an appointment with their PCP within 7 days of discharge?  No   Comments regarding PCP  Encourage pt to follow up   What is preventing the patient from scheduling follow up appointments within 7 days of discharge?  Haven't had time   Nursing Interventions  Educated patient on importance of making appointment   Has the patient kept scheduled appointments due by today?  No   What is preventing the patient from keeping their appointments?  Doesn't understand importance   Comments  Pt has home kit to check his INR. Refused to let me make appt for him.    Did the patient receive a copy of their discharge instructions?  Yes   Nursing interventions  Reviewed instructions with patient   What is the patient's perception of their health status since discharge?  Improving   Is the patient/caregiver able to teach back signs and symptoms related to  disease process for when to call PCP?  Yes   Is the patient/caregiver able to teach back signs and symptoms related to disease process for when to call 911?  Yes   Is the patient/caregiver able to teach back the hierarchy of who to call/visit for symptoms/problems? PCP, Specialist, Home health nurse, Urgent Care, ED, 911  Yes   Additional teach back comments  Able to lay down and sleep comfortably, have not been able to do that in 3 years.  Has not smoked in 15 years.    Week 1 call completed?  Yes          Ashlee Arguello, RN

## 2018-10-14 ENCOUNTER — READMISSION MANAGEMENT (OUTPATIENT)
Dept: CALL CENTER | Facility: HOSPITAL | Age: 63
End: 2018-10-14

## 2018-10-14 NOTE — OUTREACH NOTE
Medical Week 2 Survey      Responses   Facility patient discharged from?  [place]   Does the patient have one of the following disease processes/diagnoses(primary or secondary)?  General Surgery   Week 2 attempt successful?  Yes   Call start time  1751   Discharge diagnosis  S/P cervical spinal fusion   Call end time  1754   Is patient permission given to speak with other caregiver?  Yes   List who call center can speak with  WifeNunu reviewed with patient/caregiver?  Yes   Is the patient having any side effects they believe may be caused by any medication additions or changes?  No   Does the patient have all medications ordered at discharge?  Yes   Is the patient taking all medications as directed (includes completed medication regime)?  Yes   Does the patient have a primary care provider?   Yes   Does the patient have an appointment with their PCP within 7 days of discharge?  No   Comments regarding PCP  Dr Franklin   Has the patient kept scheduled appointments due by today?  Yes   Comments  He is having some numbness and friction irritation on his middle toe on the right.  The MD didn't seem concerned they said.   Psychosocial issues?  No   Did the patient receive a copy of their discharge instructions?  Yes   Nursing interventions  Reviewed instructions with patient   What is the patient's perception of their health status since discharge?  Improving   Is the patient/caregiver able to teach back signs and symptoms related to disease process for when to call PCP?  Yes   Is the patient/caregiver able to teach back signs and symptoms related to disease process for when to call 911?  Yes   Is the patient/caregiver able to teach back the hierarchy of who to call/visit for symptoms/problems? PCP, Specialist, Home health nurse, Urgent Care, ED, 911  Yes   Week 2 Call Completed?  Yes          Mindy Flowers RN

## 2018-12-03 ENCOUNTER — OFFICE VISIT (OUTPATIENT)
Dept: ORTHOPEDIC SURGERY | Facility: CLINIC | Age: 63
End: 2018-12-03

## 2018-12-03 VITALS — WEIGHT: 205.03 LBS | OXYGEN SATURATION: 96 % | BODY MASS INDEX: 31.07 KG/M2 | HEART RATE: 80 BPM | HEIGHT: 68 IN

## 2018-12-03 DIAGNOSIS — S97.81XA CRUSHING INJURY OF RIGHT FOOT, ANKLE, AND TOE, INITIAL ENCOUNTER: Primary | ICD-10-CM

## 2018-12-03 DIAGNOSIS — I87.8 VENOUS STASIS: ICD-10-CM

## 2018-12-03 DIAGNOSIS — M19.079 ARTHRITIS OF ANKLE: ICD-10-CM

## 2018-12-03 DIAGNOSIS — S97.01XA CRUSHING INJURY OF RIGHT FOOT, ANKLE, AND TOE, INITIAL ENCOUNTER: Primary | ICD-10-CM

## 2018-12-03 DIAGNOSIS — E11.65 UNCONTROLLED TYPE 2 DIABETES MELLITUS WITH HYPERGLYCEMIA (HCC): ICD-10-CM

## 2018-12-03 DIAGNOSIS — S97.101A CRUSHING INJURY OF RIGHT FOOT, ANKLE, AND TOE, INITIAL ENCOUNTER: Primary | ICD-10-CM

## 2018-12-03 PROCEDURE — 99203 OFFICE O/P NEW LOW 30 MIN: CPT | Performed by: ORTHOPAEDIC SURGERY
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
## 2019-01-31 ENCOUNTER — TRANSCRIBE ORDERS (OUTPATIENT)
Dept: ADMINISTRATIVE | Facility: HOSPITAL | Age: 64
End: 2019-01-31

## 2019-01-31 DIAGNOSIS — R94.31 ABNORMAL ECG: Primary | ICD-10-CM

## 2019-02-08 ENCOUNTER — HOSPITAL ENCOUNTER (OUTPATIENT)
Dept: NON INVASIVE DIAGNOSTICS | Facility: HOSPITAL | Age: 64
Discharge: HOME | End: 2019-02-08
Payer: MEDICARE

## 2019-02-08 LAB
LV EF: 28 %
LVEF MODALITY: NORMAL

## 2019-02-08 PROCEDURE — 93306 TTE W/DOPPLER COMPLETE: CPT

## 2019-04-17 ENCOUNTER — OFFICE VISIT (OUTPATIENT)
Dept: FAMILY MEDICINE CLINIC | Facility: CLINIC | Age: 64
End: 2019-04-17

## 2019-04-17 VITALS
BODY MASS INDEX: 31.07 KG/M2 | OXYGEN SATURATION: 97 % | SYSTOLIC BLOOD PRESSURE: 126 MMHG | HEIGHT: 68 IN | TEMPERATURE: 99.1 F | DIASTOLIC BLOOD PRESSURE: 72 MMHG | WEIGHT: 205 LBS | HEART RATE: 70 BPM

## 2019-04-17 DIAGNOSIS — I25.10 CORONARY ARTERY DISEASE INVOLVING NATIVE HEART WITHOUT ANGINA PECTORIS, UNSPECIFIED VESSEL OR LESION TYPE: ICD-10-CM

## 2019-04-17 DIAGNOSIS — E78.2 MIXED HYPERLIPIDEMIA: ICD-10-CM

## 2019-04-17 DIAGNOSIS — G89.4 CHRONIC PAIN SYNDROME: ICD-10-CM

## 2019-04-17 DIAGNOSIS — E11.9 TYPE 2 DIABETES MELLITUS WITHOUT COMPLICATION, WITHOUT LONG-TERM CURRENT USE OF INSULIN (HCC): ICD-10-CM

## 2019-04-17 DIAGNOSIS — K21.9 GASTROESOPHAGEAL REFLUX DISEASE, ESOPHAGITIS PRESENCE NOT SPECIFIED: ICD-10-CM

## 2019-04-17 DIAGNOSIS — I10 ESSENTIAL HYPERTENSION: Primary | ICD-10-CM

## 2019-04-17 DIAGNOSIS — J44.9 CHRONIC OBSTRUCTIVE PULMONARY DISEASE, UNSPECIFIED COPD TYPE (HCC): ICD-10-CM

## 2019-04-17 DIAGNOSIS — Z86.718 HISTORY OF DVT (DEEP VEIN THROMBOSIS): ICD-10-CM

## 2019-04-17 DIAGNOSIS — Z79.01 CHRONIC ANTICOAGULATION: ICD-10-CM

## 2019-04-17 PROBLEM — E78.5 HLD (HYPERLIPIDEMIA): Status: RESOLVED | Noted: 2018-09-25 | Resolved: 2019-04-17

## 2019-04-17 PROCEDURE — 99214 OFFICE O/P EST MOD 30 MIN: CPT | Performed by: FAMILY MEDICINE

## 2019-04-17 RX ORDER — GLIMEPIRIDE 2 MG/1
1 TABLET ORAL DAILY
COMMUNITY
Start: 2019-01-11 | End: 2019-04-17 | Stop reason: SDUPTHER

## 2019-04-17 RX ORDER — WARFARIN SODIUM 5 MG/1
TABLET ORAL
COMMUNITY
Start: 2019-01-12 | End: 2019-06-21 | Stop reason: SDUPTHER

## 2019-04-17 RX ORDER — GLIMEPIRIDE 2 MG/1
2 TABLET ORAL DAILY
Qty: 90 TABLET | Refills: 3 | Status: SHIPPED | OUTPATIENT
Start: 2019-04-17 | End: 2019-06-12 | Stop reason: SDUPTHER

## 2019-04-17 NOTE — PROGRESS NOTES
Subjective   Gene Bond is a 63 y.o. male    Chief Complaint    Establish primary care  Coronary artery disease with previous stent  Hyperlipidemia  Hypertension  Type 2 diabetes mellitus  Chronic back pain  Recent lumbar fusion  Methadone treatment  History recurrent DVT    History of Present Illness  Patient presents today to establish care.  Most recently he has been a patient at the [facility].  Patient had surgery 3 weeks ago by Dr. Thong saenz with a lumbar disc fusion and also removal of hypertrophic bone from his right ankle.  He feels he is recovering well from his surgery.  He has no acute complaints today.  His history is reviewed and is quite complicated as noted above.  He sees Dr. Bala Burgess at the pain treatment center here in [facility] for methadone, gabapentin and tizanidine prescriptions.  He is on chronic Coumadin therapy due to recurrent DVT.  He has a home monitor to check his PT/INR which he does weekly or biweekly.    The following portions of the patient's history were reviewed and updated as appropriate: allergies, current medications, past social history and problem list    Review of Systems   Constitutional: Negative.  Negative for appetite change, chills, diaphoresis, fatigue, fever and unexpected weight change.   Eyes: Negative for visual disturbance.   Respiratory: Negative.  Negative for cough, chest tightness, shortness of breath and wheezing.    Cardiovascular: Negative for chest pain, palpitations and leg swelling.   Gastrointestinal: Negative.  Negative for abdominal pain, diarrhea, nausea and vomiting.   Endocrine: Negative for polydipsia, polyphagia and polyuria.   Genitourinary: Negative.  Negative for dysuria, frequency and urgency.   Musculoskeletal: Positive for back pain. Negative for arthralgias, gait problem and myalgias.   Skin: Negative for color change and rash.   Neurological: Negative for dizziness, tremors, syncope,  weakness, light-headedness, numbness and headaches.   Hematological: Negative for adenopathy. Does not bruise/bleed easily.   Psychiatric/Behavioral: Negative for behavioral problems and dysphoric mood. The patient is not nervous/anxious.        Objective     Vitals:    04/17/19 1252   BP: 126/72   Pulse: 70   Temp: 99.1 °F (37.3 °C)   SpO2: 97%       Physical Exam   Constitutional: He is oriented to person, place, and time. He appears well-developed and well-nourished.   HENT:   Head: Normocephalic.   Mouth/Throat: Oropharynx is clear and moist.   Eyes: Conjunctivae are normal. Pupils are equal, round, and reactive to light.   Neck: Neck supple. No JVD present. No thyromegaly present.   Cardiovascular: Normal rate, regular rhythm, normal heart sounds, intact distal pulses and normal pulses.   No murmur heard.  Pulmonary/Chest: Effort normal and breath sounds normal. No respiratory distress.   Abdominal: Soft. Bowel sounds are normal. There is no hepatosplenomegaly. There is no tenderness.   Musculoskeletal: He exhibits no edema.        Lumbar back: He exhibits decreased range of motion, tenderness, bony tenderness and pain. He exhibits no swelling, no deformity and no spasm.   Lymphadenopathy:     He has no cervical adenopathy.   Neurological: He is alert and oriented to person, place, and time. He has normal reflexes. No sensory deficit.   Skin: Skin is warm and dry. No rash noted. He is not diaphoretic.   Psychiatric: He has a normal mood and affect. His behavior is normal.   Nursing note and vitals reviewed.      Assessment/Plan   Problem List Items Addressed This Visit        Cardiovascular and Mediastinum    Mixed hyperlipidemia    HTN (hypertension) - Primary    Coronary artery disease involving native heart without angina pectoris       Respiratory    COPD (chronic obstructive pulmonary disease) (CMS/[facility])       Endocrine    DM (diabetes mellitus) (CMS/[facility])    Relevant Medications    glimepiride (AMARYL) 2 MG  tablet       Other    History of DVT (deep vein thrombosis)      Other Visit Diagnoses     Chronic anticoagulation        Chronic pain syndrome        Gastroesophageal reflux disease, esophagitis presence not specified

## 2019-05-21 ENCOUNTER — TELEPHONE (OUTPATIENT)
Dept: FAMILY MEDICINE CLINIC | Facility: CLINIC | Age: 64
End: 2019-05-21

## 2019-05-21 RX ORDER — WARFARIN SODIUM 7.5 MG/1
7.5 TABLET ORAL
Qty: 90 TABLET | Refills: 1
Start: 2019-05-21 | End: 2019-06-12 | Stop reason: SDUPTHER

## 2019-05-21 NOTE — TELEPHONE ENCOUNTER
----- Message from Jay Irvin sent at 5/21/2019  3:04 PM EDT -----  Contact: Erik- [facility]  [phone]- [facility]    Trying to get medication refill.     Walfarin tablets

## 2019-06-12 RX ORDER — LISINOPRIL 10 MG/1
10 TABLET ORAL DAILY
Qty: 90 TABLET | Refills: 3 | Status: SHIPPED | OUTPATIENT
Start: 2019-06-12 | End: 2020-03-23

## 2019-06-12 RX ORDER — WARFARIN SODIUM 7.5 MG/1
7.5 TABLET ORAL
Qty: 90 TABLET | Refills: 1 | Status: ON HOLD
Start: 2019-06-12 | End: 2019-12-27

## 2019-06-12 RX ORDER — PANTOPRAZOLE SODIUM 40 MG/1
40 TABLET, DELAYED RELEASE ORAL DAILY
Qty: 90 TABLET | Refills: 3 | Status: SHIPPED | OUTPATIENT
Start: 2019-06-12 | End: 2020-03-23

## 2019-06-12 RX ORDER — GLIMEPIRIDE 2 MG/1
2 TABLET ORAL DAILY
Qty: 90 TABLET | Refills: 3 | Status: SHIPPED | OUTPATIENT
Start: 2019-06-12 | End: 2020-07-20 | Stop reason: SDUPTHER

## 2019-06-12 RX ORDER — ATORVASTATIN CALCIUM 80 MG/1
80 TABLET, FILM COATED ORAL NIGHTLY
Qty: 90 TABLET | Refills: 3 | Status: SHIPPED | OUTPATIENT
Start: 2019-06-12 | End: 2020-03-23

## 2019-06-12 NOTE — TELEPHONE ENCOUNTER
Sent in atorvastatin, glimepiride, lisinopril, metformin, pantoprazole, warfarin sent to [facility]

## 2019-06-16 ENCOUNTER — HOSPITAL ENCOUNTER (EMERGENCY)
Facility: HOSPITAL | Age: 64
Discharge: HOME | End: 2019-06-16
Attending: EMERGENCY MEDICINE
Payer: MEDICARE

## 2019-06-16 VITALS
WEIGHT: 200 LBS | HEART RATE: 81 BPM | BODY MASS INDEX: 29.62 KG/M2 | TEMPERATURE: 100 F | OXYGEN SATURATION: 97 % | HEIGHT: 69 IN | SYSTOLIC BLOOD PRESSURE: 141 MMHG | RESPIRATION RATE: 16 BRPM | DIASTOLIC BLOOD PRESSURE: 78 MMHG

## 2019-06-16 DIAGNOSIS — R09.81 NASAL CONGESTION: Primary | ICD-10-CM

## 2019-06-16 DIAGNOSIS — J01.00 ACUTE NON-RECURRENT MAXILLARY SINUSITIS: ICD-10-CM

## 2019-06-16 PROCEDURE — 99282 EMERGENCY DEPT VISIT SF MDM: CPT

## 2019-06-16 RX ORDER — PANTOPRAZOLE SODIUM 40 MG/1
40 TABLET, DELAYED RELEASE ORAL DAILY
COMMUNITY
Start: 2019-06-12

## 2019-06-16 RX ORDER — GLIMEPIRIDE 2 MG/1
2 TABLET ORAL 2 TIMES DAILY
COMMUNITY
Start: 2019-06-12

## 2019-06-16 RX ORDER — TIZANIDINE 4 MG/1
4 TABLET ORAL 3 TIMES DAILY
COMMUNITY

## 2019-06-16 RX ORDER — NAPROXEN 500 MG/1
500 TABLET ORAL 2 TIMES DAILY PRN
Qty: 20 TABLET | Refills: 0 | Status: SHIPPED | OUTPATIENT
Start: 2019-06-16 | End: 2019-06-26

## 2019-06-16 RX ORDER — AMOXICILLIN AND CLAVULANATE POTASSIUM 875; 125 MG/1; MG/1
1 TABLET, FILM COATED ORAL 2 TIMES DAILY
Qty: 20 TABLET | Refills: 0 | Status: SHIPPED | OUTPATIENT
Start: 2019-06-16 | End: 2019-06-26

## 2019-06-16 RX ORDER — ATORVASTATIN CALCIUM 80 MG/1
80 TABLET, FILM COATED ORAL NIGHTLY
COMMUNITY
Start: 2019-06-12

## 2019-06-16 ASSESSMENT — PAIN SCALES - GENERAL: PAINLEVEL_OUTOF10: 5

## 2019-06-16 ASSESSMENT — PAIN DESCRIPTION - DESCRIPTORS: DESCRIPTORS: TENDER

## 2019-06-16 ASSESSMENT — PAIN DESCRIPTION - ONSET: ONSET: GRADUAL

## 2019-06-16 ASSESSMENT — PAIN DESCRIPTION - PROGRESSION: CLINICAL_PROGRESSION: GRADUALLY WORSENING

## 2019-06-16 ASSESSMENT — PAIN DESCRIPTION - LOCATION: LOCATION: FACE

## 2019-06-16 ASSESSMENT — PAIN DESCRIPTION - PAIN TYPE: TYPE: ACUTE PAIN

## 2019-06-16 ASSESSMENT — PAIN DESCRIPTION - FREQUENCY: FREQUENCY: CONTINUOUS

## 2019-06-16 NOTE — ED NOTES
Reviewed discharge plan with Gary Scott. Encouraged him to f/u with Arti Crawford and he understood. NAD noted on discharge, gait steady. Current Discharge Medication List           Details   naproxen (NAPROSYN) 500 MG tablet Take 1 tablet by mouth 2 times daily as needed for Pain  Qty: 20 tablet, Refills: 0      amoxicillin-clavulanate (AUGMENTIN) 875-125 MG per tablet Take 1 tablet by mouth 2 times daily for 10 days  Qty: 20 tablet, Refills: 0              Details   atorvastatin (LIPITOR) 80 MG tablet Take 80 mg by mouth nightly      glimepiride (AMARYL) 2 MG tablet Take 2 mg by mouth daily      metFORMIN (GLUCOPHAGE) 1000 MG tablet Take 1,000 mg by mouth 2 times daily      pantoprazole (PROTONIX) 40 MG tablet Take 40 mg by mouth daily      tiZANidine (ZANAFLEX) 4 MG tablet Take 4 mg by mouth 3 times daily      Warfarin Sodium (COUMADIN PO) Take 7.5 mg by mouth daily       methadone (DOLOPHINE) 10 MG tablet Take 10 mg by mouth 6 times daily. aspirin 81 MG EC tablet Take 81 mg by mouth daily. metoprolol (LOPRESSOR) 25 MG tablet Take 25 mg by mouth 2 times daily. lisinopril (PRINIVIL;ZESTRIL) 10 MG tablet Take 10 mg by mouth daily. gabapentin (NEURONTIN) 600 MG tablet Take 600 mg by mouth 3 times daily. Anny Alberto states understanding of how and when to take medications.     Vitals:    06/16/19 1533   BP: (!) 141/78   Pulse: 81   Resp: 16   Temp: 100 °F (37.8 °C)   SpO2: 97%       Electronically signed by Lewis Mandel RN on 6/16/2019 at [address] [place] [address]  06/16/19 8622

## 2019-06-16 NOTE — ED PROVIDER NOTES
[address] [facility] ENCOUNTER      Pt Name: Michelle Sheets  MRN: [id number]  YOB: 1955  Date of evaluation: 6/16/2019  Provider: Aidan [address]       Chief Complaint   Patient presents with    Nasal Congestion    Fever         HISTORY OF PRESENT ILLNESS  (Location/Symptom, Timing/Onset, Context/Setting, Quality, Duration, Modifying Factors, Severity.)   Michelle Sheets is a 70 y.o. male who presents to the emergency department for evaluation of sinus pressure and congestion, has had some drainage from his nasal sinuses, no neck pain or stiffness, has had some low-grade fever over the last couple days. No recent travel, no known sick contacts. Denies any headache or neck pain or stiffness. No difficulty swallowing or sore throat. He denies any other acute systemic complaints at this time. Nursing notes were reviewed. REVIEW OFSYSTEMS    (2-9 systems for level 4, 10 or more for level 5)   ROS:  General:  + Low-grade fevers, no chills, no weakness  Cardiovascular:  No chest pain, no palpitations  Respiratory:  No shortness of breath, no cough, no wheezing  Gastrointestinal:  No pain, no nausea, no vomiting, no diarrhea  Musculoskeletal:  No muscle pain, no joint pain  Skin:  No rash, no easy bruising  Neurologic:  No speech problems, no headache, no extremity weakness  Psychiatric:  No anxiety  Genitourinary:  No dysuria, no hematuria    Except as noted above the remainder of the review of systems was reviewed and negative.        PAST MEDICAL HISTORY     Past Medical History:   Diagnosis Date    Acid reflux     CAD (coronary artery disease)     Central sleep apnea     DVT (deep venous thrombosis) ([facility] [address] 2001    Hyperlipidemia     MI (myocardial infarction) ([facility] [address] [facility] 1993    Polycythemia vera(238.4)     Ruptured lumbar disc     L3, L4         SURGICAL HISTORY       Past Surgical History:   Procedure Laterality Date  ANKLE SURGERY      left ankle skin graph     APPENDECTOMY      ARM DEBRIDEMENT      right arm surgery; (staph)    BACK SURGERY      CORONARY ANGIOPLASTY WITH STENT PLACEMENT  2009    TOTAL KNEE ARTHROPLASTY      both          CURRENT MEDICATIONS       Previous Medications    ASPIRIN 81 MG EC TABLET    Take 81 mg by mouth daily. ATORVASTATIN (LIPITOR) 80 MG TABLET    Take 80 mg by mouth nightly    GABAPENTIN (NEURONTIN) 600 MG TABLET    Take 600 mg by mouth 3 times daily. GLIMEPIRIDE (AMARYL) 2 MG TABLET    Take 2 mg by mouth daily    LISINOPRIL (PRINIVIL;ZESTRIL) 10 MG TABLET    Take 10 mg by mouth daily. METFORMIN (GLUCOPHAGE) 1000 MG TABLET    Take 1,000 mg by mouth 2 times daily    METHADONE (DOLOPHINE) 10 MG TABLET    Take 10 mg by mouth 6 times daily. METOPROLOL (LOPRESSOR) 25 MG TABLET    Take 25 mg by mouth 2 times daily. PANTOPRAZOLE (PROTONIX) 40 MG TABLET    Take 40 mg by mouth daily    TIZANIDINE (ZANAFLEX) 4 MG TABLET    Take 4 mg by mouth 3 times daily    WARFARIN SODIUM (COUMADIN PO)    Take 7.5 mg by mouth daily        ALLERGIES     Patient has no known allergies.     FAMILY HISTORY       Family History   Problem Relation Age of Onset    Diabetes Mother           SOCIAL HISTORY       Social History     Socioeconomic History    Marital status:      Spouse name: None    Number of children: None    Years of education: None    Highest education level: None   Occupational History    None   Social Needs    Financial resource strain: None    Food insecurity:     Worry: None     Inability: None    Transportation needs:     Medical: None     Non-medical: None   Tobacco Use    Smoking status: Former Smoker     Years: 37.00     Last attempt to quit: 2006     Years since quittin.4   Substance and Sexual Activity    Alcohol use: No    Drug use: No    Sexual activity: None   Lifestyle    Physical activity:     Days per week: None     Minutes per session: None  Stress: None   Relationships    Social connections:     Talks on phone: None     Gets together: None     Attends Rastafarian service: None     Active member of club or organization: None     Attends meetings of clubs or organizations: None     Relationship status: None    Intimate partner violence:     Fear of current or ex partner: None     Emotionally abused: None     Physically abused: None     Forced sexual activity: None   Other Topics Concern    None   Social History Narrative    None         PHYSICAL EXAM    (up to 7 for level 4, 8 or more for level 5)     ED Triage Vitals   BP Temp Temp src Pulse Resp SpO2 Height Weight   -- -- -- -- -- -- -- --       Physical Exam  General :Patient is awake, alert, oriented, in no acute distress, nontoxic appearing  HEENT: There is mild test palpation of bilateral maxillary sinuses, no active bleeding or drainage from bilateral nares, throat is clear, no meningeal signs and physical exam.  Pupils are equally round and reactive to light, EOMI, conjunctivae clear. Oral mucosa is moist, no exudate. Uvula is midline  Neck: Neck is supple, full range of motion, trachea midline  Cardiac: Heart regular rate, rhythm, no murmurs, rubs, or gallops  Lungs: Lungs are clear to auscultation, there is no wheezing, rhonchi, or rales. There is no use of accessory muscles. Chest wall: There is no tenderness to palpation over the chest wall or over ribs  Abdomen: Abdomen is soft, nontender, nondistended. There is no firm or pulsatile masses, no rebound rigidity or guarding. Musculoskeletal: 5 out of 5 strength in all 4 extremities. No focal muscle deficits are appreciated  Neuro: Motor intact, sensory intact, level of consciousness is normal, GCS 15  Dermatology: Skin is warm and dry  Psych: Mentation is grossly normal, cognition is grossly normal. Affect is appropriate.       DIAGNOSTIC RESULTS     EKG: All EKG's are interpreted by the Emergency Department Physician who either signs or Co-signs this chart in the absenceof a cardiologist.        RADIOLOGY:   Non-plain film images such as CT, Ultrasound and MRI are read by the radiologist. Plain radiographic images are visualized and preliminarily interpreted by the emergency physician with the below findings:      ? Radiologist's Report Reviewed:  No orders to display         ED BEDSIDE ULTRASOUND:   Performed by ED Physician - none    LABS:    I have reviewed and interpreted all of the currently available lab results from this visit (ifapplicable):  No results found for this visit on 06/16/19. All other labs were within normal range or not returned as of this dictation. EMERGENCY DEPARTMENT COURSE and DIFFERENTIAL DIAGNOSIS/MDM:   Vitals:    Vitals:    06/16/19 1533   BP: (!) 141/78   Pulse: 81   Resp: 16   Temp: 100 °F (37.8 °C)   TempSrc: Oral   SpO2: 97%   Weight: 200 lb (90.7 kg)   Height: 5' 9\" (1.753 m)       MEDICATIONS ADMINISTERED IN ED:  Medications - No data to display    Patient was sinus pressure, congestion, inflammatory changes of the sinuses, also with low-grade fever, some drainage is noted. With the patient's symptoms we discussed treatment for sinusitis/infection with anti-inflammatories, as well as nasal sprays, close follow-up with his PCP. The patient will follow-up with their PCP in 1-2 days for reevaluation. If the patient or family members have anyfurther concerns or any worsening symptoms they will return to the ED for reevaluation. CONSULTS:  None    PROCEDURES:  Procedures    CRITICAL CARE TIME    Total Critical Care time was 0 minutes, excluding separately reportable procedures. There was a high probability of clinically significant/life threatening deterioration in the patient's condition which required my urgent intervention. FINAL IMPRESSION      1. Nasal congestion    2.  Acute non-recurrent maxillary sinusitis          DISPOSITION/PLAN   DISPOSITION Decision To Discharge 06/16/2019

## 2019-06-21 RX ORDER — WARFARIN SODIUM 5 MG/1
TABLET ORAL
Qty: 90 TABLET | Refills: 3 | Status: ON HOLD | OUTPATIENT
Start: 2019-06-21 | End: 2019-12-27

## 2019-06-21 NOTE — TELEPHONE ENCOUNTER
[facility] called and requested a refill on Warfarin 5mg, patient was previously prescribed this by his previous PCP Dr. Mayo Franklin. He is taking 5mg on Monday/Tues/Weds/Thurs/Fri and 7.5 on Sat/Sun. He would like the 5mg dosing to be called into his mail order pharmacy. Patient was last seen on 04/17/2019

## 2019-06-25 ENCOUNTER — TELEPHONE (OUTPATIENT)
Dept: FAMILY MEDICINE CLINIC | Facility: CLINIC | Age: 64
End: 2019-06-25

## 2019-06-25 RX ORDER — WARFARIN SODIUM 4 MG/1
4 TABLET ORAL
Qty: 90 TABLET | Refills: 1
Start: 2019-06-25 | End: 2021-01-01 | Stop reason: SDUPTHER

## 2019-06-25 NOTE — TELEPHONE ENCOUNTER
----- Message from Kasia Kothari sent at 6/25/2019 10:18 AM EDT -----  Contact: WIFE:  LUIS DOSS  PT. SEE'S DR. SWEENEY  PT. IS NEEDING REFILL ON:  warfarin (COUMADIN) 4 MG tablet   9/26/2018    Sig - Route: Take 1 tablet by mouth Daily. Resume 10/1/18. 4mg daily but Take 7.5 tablet usually on Friday one time dose - Oral   Class: No Print     RX=[facility] - [address] [phone]  - [phone]  [phone] (Phone)  [phone] (Fax)    PT. CAN BE REACHED @ [facility] #.

## 2019-07-17 ENCOUNTER — OFFICE VISIT (OUTPATIENT)
Dept: FAMILY MEDICINE CLINIC | Facility: CLINIC | Age: 64
End: 2019-07-17

## 2019-07-17 VITALS
BODY MASS INDEX: 30.77 KG/M2 | OXYGEN SATURATION: 96 % | WEIGHT: 203 LBS | SYSTOLIC BLOOD PRESSURE: 136 MMHG | HEART RATE: 58 BPM | HEIGHT: 68 IN | RESPIRATION RATE: 16 BRPM | DIASTOLIC BLOOD PRESSURE: 82 MMHG

## 2019-07-17 DIAGNOSIS — I25.10 CORONARY ARTERY DISEASE INVOLVING NATIVE HEART WITHOUT ANGINA PECTORIS, UNSPECIFIED VESSEL OR LESION TYPE: ICD-10-CM

## 2019-07-17 DIAGNOSIS — J44.9 CHRONIC OBSTRUCTIVE PULMONARY DISEASE, UNSPECIFIED COPD TYPE (HCC): ICD-10-CM

## 2019-07-17 DIAGNOSIS — E78.2 MIXED HYPERLIPIDEMIA: ICD-10-CM

## 2019-07-17 DIAGNOSIS — E11.9 TYPE 2 DIABETES MELLITUS WITHOUT COMPLICATION, WITHOUT LONG-TERM CURRENT USE OF INSULIN (HCC): ICD-10-CM

## 2019-07-17 DIAGNOSIS — G89.4 CHRONIC PAIN SYNDROME: ICD-10-CM

## 2019-07-17 DIAGNOSIS — I10 ESSENTIAL HYPERTENSION: Primary | ICD-10-CM

## 2019-07-17 PROCEDURE — 99214 OFFICE O/P EST MOD 30 MIN: CPT | Performed by: FAMILY MEDICINE

## 2019-08-12 ENCOUNTER — HOSPITAL ENCOUNTER (EMERGENCY)
Facility: HOSPITAL | Age: 64
Discharge: HOME | End: 2019-08-12
Attending: FAMILY MEDICINE
Payer: MEDICARE

## 2019-08-12 VITALS
RESPIRATION RATE: 16 BRPM | BODY MASS INDEX: 29.62 KG/M2 | WEIGHT: 200 LBS | HEIGHT: 69 IN | TEMPERATURE: 97.8 F | OXYGEN SATURATION: 98 % | DIASTOLIC BLOOD PRESSURE: 79 MMHG | SYSTOLIC BLOOD PRESSURE: 130 MMHG | HEART RATE: 60 BPM

## 2019-08-12 DIAGNOSIS — L03.032 PARONYCHIA OF TOE OF LEFT FOOT: Primary | ICD-10-CM

## 2019-08-12 PROCEDURE — 99282 EMERGENCY DEPT VISIT SF MDM: CPT

## 2019-08-12 RX ORDER — SULFAMETHOXAZOLE AND TRIMETHOPRIM 800; 160 MG/1; MG/1
1 TABLET ORAL 2 TIMES DAILY
Qty: 20 TABLET | Refills: 0 | Status: SHIPPED | OUTPATIENT
Start: 2019-08-12 | End: 2019-08-22

## 2019-08-12 ASSESSMENT — PAIN DESCRIPTION - FREQUENCY: FREQUENCY: CONTINUOUS

## 2019-08-12 ASSESSMENT — PAIN DESCRIPTION - PAIN TYPE: TYPE: ACUTE PAIN

## 2019-08-12 ASSESSMENT — PAIN DESCRIPTION - LOCATION: LOCATION: TOE (COMMENT WHICH ONE)

## 2019-08-12 ASSESSMENT — PAIN DESCRIPTION - PROGRESSION: CLINICAL_PROGRESSION: GRADUALLY WORSENING

## 2019-08-12 ASSESSMENT — PAIN DESCRIPTION - ORIENTATION: ORIENTATION: LEFT

## 2019-08-12 ASSESSMENT — PAIN DESCRIPTION - DESCRIPTORS: DESCRIPTORS: SORE

## 2019-08-12 ASSESSMENT — PAIN DESCRIPTION - ONSET: ONSET: GRADUAL

## 2019-08-12 ASSESSMENT — ENCOUNTER SYMPTOMS: COLOR CHANGE: 1

## 2019-08-12 ASSESSMENT — PAIN SCALES - GENERAL: PAINLEVEL_OUTOF10: 3

## 2019-08-12 NOTE — ED NOTES
Reviewed discharge plan with Geovanna Varner. Encouraged him to f/u with Scarlett Dobson and he understood. NAD noted on discharge, gait steady. Current Discharge Medication List           Details   sulfamethoxazole-trimethoprim (BACTRIM DS;SEPTRA DS) 800-160 MG per tablet Take 1 tablet by mouth 2 times daily for 10 days  Qty: 20 tablet, Refills: 0              Details   atorvastatin (LIPITOR) 80 MG tablet Take 80 mg by mouth nightly      glimepiride (AMARYL) 2 MG tablet Take 2 mg by mouth daily      metFORMIN (GLUCOPHAGE) 1000 MG tablet Take 1,000 mg by mouth 2 times daily      pantoprazole (PROTONIX) 40 MG tablet Take 40 mg by mouth daily      tiZANidine (ZANAFLEX) 4 MG tablet Take 4 mg by mouth 3 times daily      naproxen (NAPROSYN) 500 MG tablet Take 1 tablet by mouth 2 times daily as needed for Pain  Qty: 20 tablet, Refills: 0      Warfarin Sodium (COUMADIN PO) Take 7.5 mg by mouth daily       methadone (DOLOPHINE) 10 MG tablet Take 10 mg by mouth 6 times daily. aspirin 81 MG EC tablet Take 81 mg by mouth daily. metoprolol (LOPRESSOR) 25 MG tablet Take 25 mg by mouth 2 times daily. lisinopril (PRINIVIL;ZESTRIL) 10 MG tablet Take 10 mg by mouth daily. gabapentin (NEURONTIN) 600 MG tablet Take 600 mg by mouth 3 times daily. Beth Martins states understanding of how and when to take medications.     Vitals:    08/12/19 0929   BP: 130/79   Pulse: 60   Resp: 16   Temp: 97.8 °F (36.6 °C)   SpO2: 98%       Electronically signed by Dedra Raygoza RN on 8/12/2019 at [address] [facility]  08/12/19 9767

## 2019-08-12 NOTE — ED PROVIDER NOTES
[address] [place] [address] Court  eMERGENCY dEPARTMENT eNCOUnter      Pt Name: Bg Child  MRN: [id number]  [place] 1955  Date of evaluation: 8/12/2019  Provider: Darryn Sebastian MD    [address] [place] [address]       Chief Complaint   Patient presents with    Toe Pain         HISTORY OF PRESENT ILLNESS   (Location/Symptom, Timing/Onset, Context/Setting, Quality, Duration, Modifying Factors, Severity)  Note limiting factors. Bg Child is a 75 y.o. male who presents to the emergency department with a 10 day history of swelling, redness and pain to the left great toe. Nursing Notes were reviewed. REVIEW OF SYSTEMS    (2-9 systems for level 4, 10 or more forlevel 5)     Review of Systems   Constitutional: Negative for chills and fever. Musculoskeletal: Positive for arthralgias. Skin: Positive for color change and wound. Except as noted above the remainder of the review of systems was reviewed and negative. PAST MEDICAL HISTORY     Past Medical History:   Diagnosis Date    Acid reflux     CAD (coronary artery disease)     Central sleep apnea     DVT (deep [facility]) [facility] [address] 2001    Hyperlipidemia     MI (myocardial infarction) ([facility] [address] 1993    Polycythemia vera(238.4)     Ruptured lumbar disc     L3, L4         SURGICAL HISTORY       Past Surgical History:   Procedure Laterality Date    ANKLE SURGERY      left ankle skin graph     APPENDECTOMY      ARM DEBRIDEMENT      right arm surgery; (staph)    BACK SURGERY      CORONARY ANGIOPLASTY WITH STENT PLACEMENT  06/2009    TOTAL KNEE ARTHROPLASTY      both          CURRENT MEDICATIONS       Previous Medications    ASPIRIN 81 MG EC TABLET    Take 81 mg by mouth daily. ATORVASTATIN (LIPITOR) 80 MG TABLET    Take 80 mg by mouth nightly    GABAPENTIN (NEURONTIN) 600 MG TABLET    Take 600 mg by mouth 3 times daily.      GLIMEPIRIDE (AMARYL) 2 MG TABLET    Take 2 mg by mouth daily    LISINOPRIL (PRINIVIL;ZESTRIL) 10 MG

## 2019-10-18 ENCOUNTER — LAB (OUTPATIENT)
Dept: LAB | Facility: HOSPITAL | Age: 64
End: 2019-10-18

## 2019-10-18 ENCOUNTER — OFFICE VISIT (OUTPATIENT)
Dept: FAMILY MEDICINE CLINIC | Facility: CLINIC | Age: 64
End: 2019-10-18

## 2019-10-18 VITALS
SYSTOLIC BLOOD PRESSURE: 142 MMHG | HEIGHT: 68 IN | HEART RATE: 61 BPM | TEMPERATURE: 98.4 F | DIASTOLIC BLOOD PRESSURE: 82 MMHG | OXYGEN SATURATION: 98 % | WEIGHT: 208 LBS | BODY MASS INDEX: 31.52 KG/M2

## 2019-10-18 DIAGNOSIS — J44.9 CHRONIC OBSTRUCTIVE PULMONARY DISEASE, UNSPECIFIED COPD TYPE (HCC): ICD-10-CM

## 2019-10-18 DIAGNOSIS — Z51.81 MEDICATION MONITORING ENCOUNTER: ICD-10-CM

## 2019-10-18 DIAGNOSIS — E11.9 TYPE 2 DIABETES MELLITUS WITHOUT COMPLICATION, WITHOUT LONG-TERM CURRENT USE OF INSULIN (HCC): ICD-10-CM

## 2019-10-18 DIAGNOSIS — I10 ESSENTIAL HYPERTENSION: ICD-10-CM

## 2019-10-18 DIAGNOSIS — G89.4 CHRONIC PAIN SYNDROME: ICD-10-CM

## 2019-10-18 DIAGNOSIS — Z12.5 PROSTATE CANCER SCREENING: ICD-10-CM

## 2019-10-18 DIAGNOSIS — K21.9 GASTROESOPHAGEAL REFLUX DISEASE, ESOPHAGITIS PRESENCE NOT SPECIFIED: ICD-10-CM

## 2019-10-18 DIAGNOSIS — E78.2 MIXED HYPERLIPIDEMIA: ICD-10-CM

## 2019-10-18 DIAGNOSIS — Z00.00 INITIAL MEDICARE ANNUAL WELLNESS VISIT: Primary | ICD-10-CM

## 2019-10-18 DIAGNOSIS — Z23 IMMUNIZATION DUE: ICD-10-CM

## 2019-10-18 DIAGNOSIS — I25.10 CORONARY ARTERY DISEASE INVOLVING NATIVE HEART WITHOUT ANGINA PECTORIS, UNSPECIFIED VESSEL OR LESION TYPE: ICD-10-CM

## 2019-10-18 DIAGNOSIS — R20.2 PARESTHESIAS: ICD-10-CM

## 2019-10-18 LAB
ALBUMIN SERPL-MCNC: 4.2 G/DL (ref 3.5–5.2)
ALBUMIN/GLOB SERPL: 1.1 G/DL
ALP SERPL-CCNC: 67 U/L (ref 39–117)
ALT SERPL W P-5'-P-CCNC: 13 U/L (ref 1–41)
ANION GAP SERPL CALCULATED.3IONS-SCNC: 12.8 MMOL/L (ref 5–15)
AST SERPL-CCNC: 14 U/L (ref 1–40)
BILIRUB SERPL-MCNC: 0.4 MG/DL (ref 0.2–1.2)
BUN BLD-MCNC: 10 MG/DL (ref 8–23)
BUN/CREAT SERPL: 10.3 (ref 7–25)
CALCIUM SPEC-SCNC: 9 MG/DL (ref 8.6–10.5)
CHLORIDE SERPL-SCNC: 97 MMOL/L (ref 98–107)
CHOLEST SERPL-MCNC: 104 MG/DL (ref 0–200)
CO2 SERPL-SCNC: 29.2 MMOL/L (ref 22–29)
CREAT BLD-MCNC: 0.97 MG/DL (ref 0.76–1.27)
DEPRECATED RDW RBC AUTO: 45.1 FL (ref 37–54)
ERYTHROCYTE [DISTWIDTH] IN BLOOD BY AUTOMATED COUNT: 14.2 % (ref 12.3–15.4)
GFR SERPL CREATININE-BSD FRML MDRD: 77 ML/MIN/1.73
GLOBULIN UR ELPH-MCNC: 4 GM/DL
GLUCOSE BLD-MCNC: 101 MG/DL (ref 65–99)
HBA1C MFR BLD: 8 % (ref 4.8–5.6)
HCT VFR BLD AUTO: 32.8 % (ref 37.5–51)
HDLC SERPL-MCNC: 29 MG/DL (ref 40–60)
HGB BLD-MCNC: 10.9 G/DL (ref 13–17.7)
LDLC SERPL CALC-MCNC: 50 MG/DL (ref 0–100)
LDLC/HDLC SERPL: 1.73 {RATIO}
MCH RBC QN AUTO: 29.1 PG (ref 26.6–33)
MCHC RBC AUTO-ENTMCNC: 33.2 G/DL (ref 31.5–35.7)
MCV RBC AUTO: 87.7 FL (ref 79–97)
PLATELET # BLD AUTO: 129 10*3/MM3 (ref 140–450)
PMV BLD AUTO: 13 FL (ref 6–12)
POTASSIUM BLD-SCNC: 4.5 MMOL/L (ref 3.5–5.2)
PROT SERPL-MCNC: 8.2 G/DL (ref 6–8.5)
PSA SERPL-MCNC: 0.18 NG/ML (ref 0–4)
RBC # BLD AUTO: 3.74 10*6/MM3 (ref 4.14–5.8)
SODIUM BLD-SCNC: 139 MMOL/L (ref 136–145)
T4 FREE SERPL-MCNC: 0.84 NG/DL (ref 0.93–1.7)
TRIGL SERPL-MCNC: 124 MG/DL (ref 0–150)
TSH SERPL DL<=0.05 MIU/L-ACNC: 4.91 UIU/ML (ref 0.27–4.2)
VIT B12 BLD-MCNC: 407 PG/ML (ref 211–946)
VLDLC SERPL-MCNC: 24.8 MG/DL (ref 5–40)
WBC NRBC COR # BLD: 6.34 10*3/MM3 (ref 3.4–10.8)

## 2019-10-18 PROCEDURE — 82607 VITAMIN B-12: CPT

## 2019-10-18 PROCEDURE — 83036 HEMOGLOBIN GLYCOSYLATED A1C: CPT

## 2019-10-18 PROCEDURE — 36415 COLL VENOUS BLD VENIPUNCTURE: CPT

## 2019-10-18 PROCEDURE — 84443 ASSAY THYROID STIM HORMONE: CPT

## 2019-10-18 PROCEDURE — G0439 PPPS, SUBSEQ VISIT: HCPCS | Performed by: FAMILY MEDICINE

## 2019-10-18 PROCEDURE — 85027 COMPLETE CBC AUTOMATED: CPT

## 2019-10-18 PROCEDURE — 80053 COMPREHEN METABOLIC PANEL: CPT

## 2019-10-18 PROCEDURE — 84439 ASSAY OF FREE THYROXINE: CPT

## 2019-10-18 PROCEDURE — 80061 LIPID PANEL: CPT

## 2019-10-18 PROCEDURE — G0103 PSA SCREENING: HCPCS

## 2019-10-24 NOTE — PROGRESS NOTES
The ABCs of the Annual Wellness Visit  Subsequent Medicare Wellness Visit    Chief Complaint   Patient presents with   • Annual Exam     medicare wellness  Pt is fasting this morning       Subjective   History of Present Illness:  Gene Bond is a 64 y.o. male who presents for a Subsequent Medicare Wellness Visit.    HEALTH RISK ASSESSMENT    Recent Hospitalizations:  No hospitalization(s) within the last year.    Current Medical Providers:  Patient Care Team:  Jnoo Bolden MD as PCP - General (Family Medicine)  Bala Burgess MD as PCP - Claims Attributed    Smoking Status:  Social History     Tobacco Use   Smoking Status Former Smoker   • Packs/day: 1.00   • Years: 30.00   • Pack years: 30.00   • Types: Cigarettes   • Last attempt to quit:    • Years since quittin.8   Smokeless Tobacco Never Used       Alcohol Consumption:  Social History     Substance and Sexual Activity   Alcohol Use No       Depression Screen:   PHQ-2/PHQ-9 Depression Screening 10/18/2019   Little interest or pleasure in doing things 0   Feeling down, depressed, or hopeless 0   Total Score 0       Fall Risk Screen:  STEADI Fall Risk Assessment was completed, and patient is at MODERATE risk for falls. Assessment completed on:10/18/2019    Health Habits and Functional and Cognitive Screening:  Functional & Cognitive Status 10/18/2019   Do you have difficulty preparing food and eating? No   Do you have difficulty bathing yourself, getting dressed or grooming yourself? No   Do you have difficulty using the toilet? No   Do you have difficulty moving around from place to place? No   Do you have trouble with steps or getting out of a bed or a chair? No   Current Diet Well Balanced Diet   Dental Exam Unknown   Eye Exam Up to date   Exercise (times per week) 0 times per week   Current Exercise Activities Include Walking   Do you need help using the phone?  No   Are you deaf or do you have serious difficulty hearing?  No    Do you need help with transportation? Yes   Do you need help shopping? No   Do you need help preparing meals?  No   Do you need help with housework?  No   Do you need help with laundry? No   Do you need help taking your medications? No   Do you need help managing money? No   Do you ever drive or ride in a car without wearing a seat belt? Yes         Does the patient have evidence of cognitive impairment? No    Asprin use counseling:Taking ASA appropriately as indicated    Age-appropriate Screening Schedule:  Refer to the list below for future screening recommendations based on patient's age, sex and/or medical conditions. Orders for these recommended tests are listed in the plan section. The patient has been provided with a written plan.    Health Maintenance   Topic Date Due   • URINE MICROALBUMIN  1955   • TDAP/TD VACCINES (1 - Tdap) 08/06/1972   • ZOSTER VACCINE (1 of 2) 08/06/2003   • DIABETIC FOOT EXAM  09/24/2018   • DIABETIC EYE EXAM  02/13/2019   • HEMOGLOBIN A1C  04/18/2020   • PNEUMOCOCCAL VACCINES (65+ LOW/MEDIUM RISK) (2 of 2 - PCV13) 10/18/2020   • LIPID PANEL  10/18/2020   • COLONOSCOPY  02/11/2023   • INFLUENZA VACCINE  Completed          The following portions of the patient's history were reviewed and updated as appropriate: allergies, current medications, past family history, past medical history, past social history, past surgical history and problem list.    Outpatient Medications Prior to Visit   Medication Sig Dispense Refill   • aspirin 81 MG EC tablet Take 81 mg by mouth Daily. LD 20th     • atorvastatin (LIPITOR) 80 MG tablet Take 1 tablet by mouth Every Night. 90 tablet 3   • docusate sodium (COLACE) 100 MG capsule Take 1 capsule by mouth 2 (Two) Times a Day. 60 capsule 0   • gabapentin (NEURONTIN) 600 MG tablet Take 600 mg by mouth 4 (Four) Times a Day.     • glimepiride (AMARYL) 2 MG tablet Take 1 tablet by mouth Daily. 90 tablet 3   • lisinopril (PRINIVIL,ZESTRIL) 10 MG tablet  Take 1 tablet by mouth Daily. 90 tablet 3   • metFORMIN (GLUCOPHAGE) 1000 MG tablet Take 1 tablet by mouth 2 (Two) Times a Day With Meals. 180 tablet 3   • methadone (DOLOPHINE) 10 MG tablet Take 30 mg by mouth Every 6 (Six) Hours As Needed for Moderate Pain ,     • metoprolol tartrate (LOPRESSOR) 25 MG tablet Take 1 tablet by mouth 2 (Two) Times a Day. 180 tablet 3   • pantoprazole (PROTONIX) 40 MG EC tablet Take 1 tablet by mouth Daily. 90 tablet 3   • SITagliptin (JANUVIA) 100 MG tablet Take 100 mg by mouth Daily.     • tiZANidine (ZANAFLEX) 4 MG tablet Take 4 mg by mouth 3 (Three) Times a Day As Needed for Muscle Spasms.     • warfarin (COUMADIN) 4 MG tablet Take 1 tablet by mouth Daily. Resume 10/1/18. 4mg daily but Take 7.5 tablet usually on Friday one time dose 90 tablet 1   • warfarin (COUMADIN) 5 MG tablet Take 1 tablet on Monday/Tuesday/Wednesday/Thursday and Friday 90 tablet 3   • warfarin (COUMADIN) 7.5 MG tablet Take 1 tablet by mouth Daily. Takes 4 mg tablet daily and 1 7.5 mg tablet weekly. Resume 10/1/18 90 tablet 1     No facility-administered medications prior to visit.        Patient Active Problem List   Diagnosis   • Disorder of intervertebral disc of high cervical region with radiculopathy   • Neck pain   • S/P cervical spinal fusion   • Mixed hyperlipidemia   • HTN (hypertension)   • DM (diabetes mellitus) (CMS/[facility])   • COPD (chronic obstructive pulmonary disease) (CMS/[facility])   • History of DVT (deep vein thrombosis)   • CAMERON treated with BiPAP   • Anemia   • Acute postoperative pain   • Arthritis of ankle   • Crushing injury of right foot, ankle, and toe   • Venous stasis   • Coronary artery disease involving native heart without angina pectoris       Advanced Care Planning:  Patient has an advance directive - a copy has been provided and is visible in patient header    Review of Systems    Compared to one year ago, the patient feels his physical health is the same.  Compared to one year ago,  "the patient feels his mental health is the same.    Reviewed chart for potential of high risk medication in the elderly: yes  Reviewed chart for potential of harmful drug interactions in the elderly:yes    Objective         Vitals:    10/18/19 0901   BP: 142/82   Pulse: 61   Temp: 98.4 °F (36.9 °C)   SpO2: 98%   Weight: 94.3 kg (208 lb)   Height: 172.7 cm (67.99\")       Body mass index is 31.63 kg/m².  Discussed the patient's BMI with him. The BMI is above average; BMI management plan is completed.    Physical Exam    Lab Results   Component Value Date    TRIG 124 10/18/2019    HDL 29 (L) 10/18/2019    LDL 50 10/18/2019    VLDL 24.8 10/18/2019    HGBA1C 8.00 (H) 10/18/2019        Assessment/Plan   Medicare Risks and Personalized Health Plan  CMS Preventative Services Quick Reference  Cardiovascular risk  Immunizations Discussed/Encouraged (specific immunizations; Pneumococcal 23 )    The above risks/problems have been discussed with the patient.  Pertinent information has been shared with the patient in the After Visit Summary.  Follow up plans and orders are seen below in the Assessment/Plan Section.    Diagnoses and all orders for this visit:    1. Initial Medicare annual wellness visit (Primary)    2. Essential hypertension  -     Comprehensive Metabolic Panel; Future  -     TSH; Future  -     T4, Free; Future    3. Mixed hyperlipidemia  -     Comprehensive Metabolic Panel; Future  -     Lipid Panel; Future    4. Coronary artery disease involving native heart without angina pectoris, unspecified vessel or lesion type  -     Comprehensive Metabolic Panel; Future  -     Hemoglobin A1c; Future  -     Lipid Panel; Future    5. Type 2 diabetes mellitus without complication, without long-term current use of insulin (CMS/[facility])  -     Comprehensive Metabolic Panel; Future  -     Hemoglobin A1c; Future  -     EMG & Nerve Conduction Test; Future    6. Chronic obstructive pulmonary disease, unspecified COPD type (CMS/[facility])  - "     CBC (No Diff); Future  -     Comprehensive Metabolic Panel; Future    7. Chronic pain syndrome  -     Ambulatory Referral to Neurosurgery  -     EMG & Nerve Conduction Test; Future    8. Gastroesophageal reflux disease, esophagitis presence not specified  -     CBC (No Diff); Future  -     Comprehensive Metabolic Panel; Future    9. Medication monitoring encounter  -     CBC (No Diff); Future  -     Comprehensive Metabolic Panel; Future    10. Paresthesias  -     CBC (No Diff); Future  -     Comprehensive Metabolic Panel; Future  -     TSH; Future  -     T4, Free; Future  -     Vitamin B12; Future  -     Ambulatory Referral to Neurosurgery  -     EMG & Nerve Conduction Test; Future    11. Prostate cancer screening  -     PSA Screen; Future    12. Immunization due  -     FluZone High Dose 65YR+ ([id number])  -     pneumococcal polysaccharide 23-valent (PNEUMOVAX-23) vaccine 0.5 mL      Follow Up:  Return in about 3 months (around 1/18/2020) for Recheck.     An After Visit Summary and PPPS were given to the patient.

## 2019-10-28 ENCOUNTER — TELEPHONE (OUTPATIENT)
Dept: FAMILY MEDICINE CLINIC | Facility: CLINIC | Age: 64
End: 2019-10-28

## 2019-10-28 NOTE — TELEPHONE ENCOUNTER
PATIENTS WIFE CALLED AND STATED THAT TESSIE NEEDS A REFILL ON ONE TOUCH ULTRA TEST STRIPS - ULTRA BLUE -  WIFE IS ON SUNDAY    DID NOT SEE THIS ON PTS MED LIST    PT CALL BACK [phone]

## 2019-10-30 ENCOUNTER — TELEPHONE (OUTPATIENT)
Dept: FAMILY MEDICINE CLINIC | Facility: CLINIC | Age: 64
End: 2019-10-30

## 2019-10-30 NOTE — TELEPHONE ENCOUNTER
"PATIENTS WIFE LUIS CALLING TO REQUEST REFILL \"One Touch Ultra Blue diabetic test strips [facility] [place]\" SEE NOTE BELOW PLEASE ADVISE. PATIENT IS COMPLETELY OUT OF OF TEST STRIPS AND HAS BEEN OUT SINCE 10/26.   PLEASE SEND TO [facility] IN [place].   CALL YOU CALL THE PATIENT WHEN THIS HAS BEEN SENT? PATIENT CALL BACK NUMBER [phone].  THANK YOU.   "

## 2019-10-30 NOTE — TELEPHONE ENCOUNTER
Regarding: Prescription Question  Contact: [phone]  ----- Message from Mychart, Generic sent at 10/29/2019  7:07 PM EDT -----    I get my One Touch Ultra Blue diabetic test strips from [facility] in [place]. Since I changed Yany from my old  To Dr. GARCIA ,they require a new prescription for them. I was there Friday,,but so far haven't received a new prescription for them. I am OUT. I test 2times a day and really need them called in. Please will you call some in for me.  I have tried since I was there on the  to get them. My number is .  My  is Aug 6  1955. THANK YOU

## 2019-10-31 ENCOUNTER — OFFICE VISIT (OUTPATIENT)
Dept: NEUROSURGERY | Facility: CLINIC | Age: 64
End: 2019-10-31

## 2019-10-31 VITALS — HEIGHT: 68 IN | BODY MASS INDEX: 31.37 KG/M2 | WEIGHT: 207 LBS | TEMPERATURE: 97.5 F

## 2019-10-31 DIAGNOSIS — M96.1 POST LAMINECTOMY SYNDROME: Primary | ICD-10-CM

## 2019-10-31 DIAGNOSIS — M48.062 SPINAL STENOSIS, LUMBAR REGION, WITH NEUROGENIC CLAUDICATION: ICD-10-CM

## 2019-10-31 PROCEDURE — 99203 OFFICE O/P NEW LOW 30 MIN: CPT | Performed by: NEUROLOGICAL SURGERY

## 2019-10-31 NOTE — PROGRESS NOTES
Subjective   Patient ID: Gene Bond is a 64 y.o. male is being seen for consultation today at the request of Jono Abraham*  Chief Complaint: Numbness in the right lower extremity    History of Present Illness: The patient is a 63-year-old man who has a history of a lumbar minimal access pedicle screw stabilization and what reports to be a lumbar fusion done in March 2019 at [facility] by Dr. Canchola.  He also has a history of a cervical discectomy in September 2018 by Dr. Gio Suarez.  His current complaint is persisting numbness in his right foot and thigh and progressing ever since his surgery last March, and now beginning to affect his left foot as well.  When he lies down he feels better, and any other activity aggravates the numbness in his legs and feet.    He has taken Coumadin for many years for diagnosis of hypercoagulability after he had lower extremity DVT years ago.  He has a history of a crush injury to his right ankle with hypertrophic healing.    Review of Radiographic Studies:  Lumbar MRI scan done on 1/5/2019 before his procedure by Dr. Canchola showed stenosis at L3-4 and L4-5, and a very minimal insignificant spondylolisthesis at L4-5.    The patient has a photo on his iPhone of a postoperative x-ray AP lumbar view showing pedicle screws and rods at L 3, L4, and L5 bilaterally    The following portions of the patient's history were reviewed, updated as appropriate and approved: allergies, current medications, past family history, past medical history, past social history, past surgical history, review of systems and problem list.    Review of Systems   Constitutional: Positive for activity change. Negative for appetite change, chills, diaphoresis, fatigue, fever and unexpected weight change.   HENT: Negative for congestion, dental problem, drooling, ear discharge, ear pain, facial swelling, hearing loss, mouth sores, nosebleeds, postnasal drip, rhinorrhea, sinus pressure,  sneezing, sore throat, tinnitus, trouble swallowing and voice change.    Eyes: Negative for photophobia, pain, discharge, redness, itching and visual disturbance.   Respiratory: Negative for apnea, cough, choking, chest tightness, shortness of breath, wheezing and stridor.    Cardiovascular: Negative for chest pain, palpitations and leg swelling.   Gastrointestinal: Negative for abdominal distention, abdominal pain, anal bleeding, blood in stool, constipation, diarrhea, nausea, rectal pain and vomiting.   Endocrine: Negative for cold intolerance, heat intolerance, polydipsia, polyphagia and polyuria.   Genitourinary: Negative for decreased urine volume, difficulty urinating, dysuria, enuresis, flank pain, frequency, genital sores, hematuria and urgency.   Musculoskeletal: Positive for arthralgias, gait problem, myalgias and neck pain. Negative for back pain, joint swelling and neck stiffness.   Skin: Negative for color change, pallor, rash and wound.   Allergic/Immunologic: Negative for environmental allergies, food allergies and immunocompromised state.   Neurological: Positive for weakness and numbness. Negative for dizziness, tremors, seizures, syncope, facial asymmetry, speech difficulty, light-headedness and headaches.   Hematological: Negative for adenopathy. Does not bruise/bleed easily.   Psychiatric/Behavioral: Negative for agitation, behavioral problems, confusion, decreased concentration, dysphoric mood, hallucinations, self-injury, sleep disturbance and suicidal ideas. The patient is not nervous/anxious and is not hyperactive.        Objective     NEUROLOGICAL EXAMINATION:      MENTAL STATUS:  Alert and oriented.  Speech intact.  Recent and remote memory intact.      CRANIAL NERVES:  Cranial nerve II:  Visual fields are full.  Cranial nerves III, IV and VI:  PERRLADC.  Extraocular movements are intact.  Nystagmus is not present.  Cranial nerve V:  Facial sensation is intact.  Cranial nerve VII:  Muscles  of facial expression reveal no asymmetry.  Cranial nerve VIII:  Hearing is intact.  Cranial nerves IX and X:  Palate elevates symmetrically.  Cranial nerve XI:  Shoulder shrug is intact.  Cranial nerve XII:  Tongue is midline without evidence of atrophy or fasciculation.    MUSCULOSKELETAL:  SLR negative bilaterally.  His lower back shows 3 entry incisions on each side and the para spinous location that would account for percutaneous placement of 3 lumbar pedicle screws.    MOTOR: Intact dorsiflexion of both first toes.    SENSATION: Numbness over the right lateral foot    REFLEXES:  DTR absent in the left and right ankles and trace in the knees      Assessment   History of some form of lumbar stabilization in March 2019 for treatment of stenosis at L3-4 and L4-5 with a minimal L4-5 spondylolisthesis.  Imaging and residual scarring of his lumbar region suggest percutaneous pedicle screw fixation.  It is very difficult to find the incision that might have been used to actually decompress the stenosis at L3-4 and L4-5.       Plan   Lumbar MRI scan to look for evidence of residual stenosis.  Lumbar CT scan to look for what evidence of laminectomy was done with the prior surgery.       Rubens Ugarte MD

## 2019-11-05 ENCOUNTER — HOSPITAL ENCOUNTER (OUTPATIENT)
Dept: CT IMAGING | Facility: HOSPITAL | Age: 64
Discharge: HOME | End: 2019-11-05
Admitting: NEUROLOGICAL SURGERY

## 2019-11-05 ENCOUNTER — HOSPITAL ENCOUNTER (OUTPATIENT)
Dept: MRI IMAGING | Facility: HOSPITAL | Age: 64
Discharge: HOME | End: 2019-11-05

## 2019-11-05 DIAGNOSIS — M96.1 POST LAMINECTOMY SYNDROME: ICD-10-CM

## 2019-11-05 PROCEDURE — A9577 INJ MULTIHANCE: HCPCS | Performed by: NEUROLOGICAL SURGERY

## 2019-11-05 PROCEDURE — 72158 MRI LUMBAR SPINE W/O & W/DYE: CPT

## 2019-11-05 PROCEDURE — 72131 CT LUMBAR SPINE W/O DYE: CPT

## 2019-11-05 PROCEDURE — 0 GADOBENATE DIMEGLUMINE 529 MG/ML SOLUTION: Performed by: NEUROLOGICAL SURGERY

## 2019-11-05 RX ADMIN — GADOBENATE DIMEGLUMINE 15 ML: 529 INJECTION, SOLUTION INTRAVENOUS at 08:08

## 2019-11-25 ENCOUNTER — HOSPITAL ENCOUNTER (OUTPATIENT)
Dept: NEUROLOGY | Facility: HOSPITAL | Age: 64
Discharge: HOME | End: 2019-11-25
Admitting: FAMILY MEDICINE

## 2019-11-25 DIAGNOSIS — E11.9 TYPE 2 DIABETES MELLITUS WITHOUT COMPLICATION, WITHOUT LONG-TERM CURRENT USE OF INSULIN (HCC): ICD-10-CM

## 2019-11-25 DIAGNOSIS — R20.2 PARESTHESIAS: ICD-10-CM

## 2019-11-25 DIAGNOSIS — G89.4 CHRONIC PAIN SYNDROME: ICD-10-CM

## 2019-11-25 PROCEDURE — 95912 NRV CNDJ TEST 11-12 STUDIES: CPT

## 2019-11-25 PROCEDURE — 95886 MUSC TEST DONE W/N TEST COMP: CPT

## 2019-11-26 ENCOUNTER — OFFICE VISIT (OUTPATIENT)
Dept: NEUROSURGERY | Facility: CLINIC | Age: 64
End: 2019-11-26

## 2019-11-26 DIAGNOSIS — M96.1 POST LAMINECTOMY SYNDROME: Primary | ICD-10-CM

## 2019-11-26 PROCEDURE — 99213 OFFICE O/P EST LOW 20 MIN: CPT | Performed by: NEUROLOGICAL SURGERY

## 2019-11-26 NOTE — PROGRESS NOTES
Subjective   Gene Bond is a 64 y.o. male who presents for follow up of right hip and leg pain with numbness.     The patient had a 2 level interbody fusion at L3-4 and L4-5 with pedicle screw fixation by Dr. Canchola in March of this year, 2019, at [facility].  He has had persistent numbness and pain to his right thigh and foot which is relieved by lying down and worse when he stands to walk.    He is taking Coumadin for many years for hypercoagulability.    Lumbar MRI scan shows prior midline laminectomy at L3-4 and L4-5.  There is placement of interbody bone without a spacer device.  There is compression of the anterior L5 superior vertebral body associated with the bone bag interbody placement.  Pedicle screws are in good position.  There is relative lateral recess stenosis on the right at L3-4 and on on the left at L4-5.    Lumbar CT scan confirms the interbody bone placement without spacer support, and satisfactory position of the pedicle screws.    The following portions of the patient's history were reviewed, updated as appropriate and approved: allergies, current medications, past family history, past medical history, past social history, past surgical history, review of systems and problem list.     Review of Systems   Constitutional: Positive for activity change. Negative for appetite change, chills, diaphoresis, fatigue, fever and unexpected weight change.   HENT: Negative for congestion, dental problem, drooling, ear discharge, ear pain, facial swelling, hearing loss, mouth sores, nosebleeds, postnasal drip, rhinorrhea, sinus pressure, sneezing, sore throat, tinnitus, trouble swallowing and voice change.    Eyes: Negative for photophobia, pain, discharge, redness, itching and visual disturbance.   Respiratory: Negative for apnea, cough, choking, chest tightness, shortness of breath, wheezing and stridor.    Cardiovascular: Negative for chest pain, palpitations and leg swelling.    Gastrointestinal: Negative for abdominal distention, abdominal pain, anal bleeding, blood in stool, constipation, diarrhea, nausea, rectal pain and vomiting.   Endocrine: Negative for cold intolerance, heat intolerance, polydipsia, polyphagia and polyuria.   Genitourinary: Negative for decreased urine volume, difficulty urinating, dysuria, enuresis, flank pain, frequency, genital sores, hematuria and urgency.   Musculoskeletal: Positive for arthralgias, gait problem, myalgias and neck pain. Negative for back pain, joint swelling and neck stiffness.   Skin: Negative for color change, pallor, rash and wound.   Allergic/Immunologic: Negative for environmental allergies, food allergies and immunocompromised state.   Neurological: Positive for weakness and numbness. Negative for dizziness, tremors, seizures, syncope, facial asymmetry, speech difficulty, light-headedness and headaches.   Hematological: Negative for adenopathy. Does not bruise/bleed easily.   Psychiatric/Behavioral: Negative for agitation, behavioral problems, confusion, decreased concentration, dysphoric mood, hallucinations, self-injury, sleep disturbance and suicidal ideas. The patient is not nervous/anxious and is not hyperactive.        Objective    NEUROLOGICAL EXAMINATION:    Dorsiflexion and plantarflexion intact bilaterally.  No focal sensory loss.    Assessment   Right hip and leg pain consistent with lateral recess stenosis on the right, probably at L3-4.       Plan   Lumbar myelogram.  If nerve root compression in the lateral recess on the right side is demonstrated, a minimal access decompression would probably be effective to alleviate this residual symptom.  Follow-up after the myelogram.       Rubens Ugarte MD

## 2019-12-05 ENCOUNTER — TELEPHONE (OUTPATIENT)
Dept: FAMILY MEDICINE CLINIC | Facility: CLINIC | Age: 64
End: 2019-12-05

## 2019-12-05 NOTE — TELEPHONE ENCOUNTER
Patient's wife Nunu called and wanted to know if Dr. Rubens Ugarte office called and requested for approval for patient to be off coumadin for 3 days for an injection procedure. Needs approval before patient can schedule procedure. Please advise    Pt call back  [phone]

## 2019-12-06 PROBLEM — M96.1 POST LAMINECTOMY SYNDROME: Status: ACTIVE | Noted: 2019-12-06

## 2019-12-27 ENCOUNTER — TELEPHONE (OUTPATIENT)
Dept: FAMILY MEDICINE CLINIC | Facility: CLINIC | Age: 64
End: 2019-12-27

## 2019-12-27 ENCOUNTER — APPOINTMENT (OUTPATIENT)
Dept: CT IMAGING | Facility: HOSPITAL | Age: 64
End: 2019-12-27

## 2019-12-27 ENCOUNTER — HOSPITAL ENCOUNTER (OUTPATIENT)
Facility: HOSPITAL | Age: 64
Discharge: HOME | End: 2019-12-27
Attending: RADIOLOGY | Admitting: NEUROLOGICAL SURGERY

## 2019-12-27 VITALS
DIASTOLIC BLOOD PRESSURE: 88 MMHG | SYSTOLIC BLOOD PRESSURE: 130 MMHG | TEMPERATURE: 97.2 F | HEIGHT: 69 IN | HEART RATE: 70 BPM | BODY MASS INDEX: 31.52 KG/M2 | WEIGHT: 212.8 LBS | OXYGEN SATURATION: 95 % | RESPIRATION RATE: 20 BRPM

## 2019-12-27 DIAGNOSIS — M96.1 POST LAMINECTOMY SYNDROME: ICD-10-CM

## 2019-12-27 LAB
GLUCOSE BLDC GLUCOMTR-MCNC: 109 MG/DL (ref 70–130)
INR PPP: 1.4 (ref 0.8–1.2)
PROTHROMBIN TIME: 16.3 SECONDS (ref 12.8–15.2)

## 2019-12-27 PROCEDURE — 85610 PROTHROMBIN TIME: CPT

## 2019-12-27 PROCEDURE — G0108 DIAB MANAGE TRN  PER INDIV: HCPCS

## 2019-12-27 PROCEDURE — S0260 H&P FOR SURGERY: HCPCS | Performed by: PHYSICIAN ASSISTANT

## 2019-12-27 PROCEDURE — 82962 GLUCOSE BLOOD TEST: CPT

## 2019-12-27 PROCEDURE — 62304 MYELOGRAPHY LUMBAR INJECTION: CPT | Performed by: RADIOLOGY

## 2019-12-27 PROCEDURE — 0 IOPAMIDOL 41 % SOLUTION: Performed by: RADIOLOGY

## 2019-12-27 PROCEDURE — 72132 CT LUMBAR SPINE W/DYE: CPT

## 2019-12-27 RX ORDER — GABAPENTIN 800 MG/1
800 TABLET ORAL 3 TIMES DAILY
COMMUNITY

## 2019-12-27 NOTE — DISCHARGE INSTRUCTIONS
1.) BRING YOUR CD TO YOUR APPOINTMENT.  2.) NO STRENUOUS ACTIVITY, DO NOT LIFT ANYTHING OVER 20 LBS FOR 24 HOURS.  3.) DRINK PLENTY OF WATER TODAY.

## 2019-12-27 NOTE — CONSULTS
"Diabetes Education  Assessment/Teaching    Patient Name:  Gene Bond  YOB: 1955  MRN: [id number]  Admit Date:  12/27/2019      Assessment Date:  12/27/2019    Most Recent Value   General Information    Referral From:  Database   Height  175.3 cm (69\")   Height Method  Stated   Weight  96.5 kg (212 lb 12.8 oz)   Weight Method  Standing scale   Pregnancy Assessment   Diabetes History   What type of diabetes do you have?  Type 2   Current DM knowledge  good   Have you had diabetes education/teaching in the past?  yes   When and where was your diabetes education?  via PCP   Do you test your blood sugar at home?  yes   Frequency of checks  BID   Meter type  unsure of name   Who performs the test?  self   Typical readings  110's-120's   Have you had low blood sugar? (<70mg/dl)  yes   How often do you have low blood sugar?  occasionally   How would you rate your diabetes control?  good   How often do you check your feet?  daily   Education Preferences   What areas of diabetes would you like to learn about?  avoiding low blood sugar, avoiding high blood sugar, understanding diabetes   Nutrition Information   Assessment Topics   Healthy Eating - Assessment  Needs education   Being Active - Assessment  Competent   Taking Medication - Assessment  Competent   Problem Solving - Assessment  Competent   Reducing Risk - Assessment  Needs education   Healthy Coping - Assessment  Competent   Monitoring - Assessment  Needs education   DM Goals            Most Recent Value   DM Education Needs   Meter  Has own   Frequency of Testing  2 times a day   Blood Glucose Target Range  per ADA recommendations   Medication  Oral, Side effects   Problem Solving  Hypoglycemia, Hyperglycemia, Signs, Sick days, Symptoms, Treatment   Reducing Risks  A1C testing, Lipids, Blood pressure   Physical Activity Frequency  Discussed exercise importance   Healthy Coping  Appropriate   Discharge Plan  Follow-up with PCP   Motivation  " "Engaged   Teaching Method  Explanation, Discussion, Handouts   Patient Response  Needs reinforcement, Verbalized understanding            Other Comments:  Saw Mr. Bond at bedside for diabetes education. Discussed with him most recent A1c 10/18/19 was 8.0. He shares that prior to that he was only taking metformin and glimeperide was added in October. He is scheduled to follow up with PCP for more lab work in January. He shares that he has experienced some low blood sugars after starting on glimeperide, most recently a few days ago. Reviewed s/sx of hypoglycemia, and self management. He currently carries glucose tablets and miniature snickers bars with him. Discussed treating hypoglycemia with rule of 15 and using 15 gm liquid or \"fast\" carb to treat low, then following up with protein/carb mix snack once glucose is greater than 70. Also pointed out written info on hypoglycemia and treatment in provided material. Stressed importance of ongoing follow up with PCP, discussed effects of pain and stress on blood sugar and how his recent difficulties with his back can contribute to raise in blood sugars. Encouraged him to continue to monitor blood sugar at home and discussed ADA target ranges for blood sugar and A1c. He shares that he anticipates possibility of more back surgery in the near future-discussed importance of glucose control after surgery and increased risk for infection/delayed healing after surgery with elevated blood sugars, encouraged him to keep record of all blood sugars checked at home and take to each PCP appointment. Encouraged follow up with PCP prior to surgery (if planned). Also encouraged him to discuss lows with PCP. Reviewed importance of consistent meals. Provided with [employer] \"What is diabetes\" handout and \"What is A1c\" handout. Discussed outpatient education-he is interested but wants to get back issues taken care of first. Provided contact info for questions or needs, and to call " when he is ready to schedule outpatient ed. Thank you for the consult!        Electronically signed by:  Kallie Zuñiga RN  12/27/19 10:02 AM

## 2020-01-01 ENCOUNTER — TELEPHONE (OUTPATIENT)
Dept: FAMILY MEDICINE CLINIC | Facility: CLINIC | Age: 65
End: 2020-01-01

## 2020-01-01 ENCOUNTER — TELEPHONE (OUTPATIENT)
Dept: ONCOLOGY | Facility: CLINIC | Age: 65
End: 2020-01-01

## 2020-01-01 ENCOUNTER — INFUSION (OUTPATIENT)
Dept: ONCOLOGY | Facility: HOSPITAL | Age: 65
End: 2020-01-01

## 2020-01-01 ENCOUNTER — OFFICE VISIT (OUTPATIENT)
Dept: ONCOLOGY | Facility: CLINIC | Age: 65
End: 2020-01-01

## 2020-01-01 ENCOUNTER — SPECIALTY PHARMACY (OUTPATIENT)
Dept: ONCOLOGY | Facility: HOSPITAL | Age: 65
End: 2020-01-01

## 2020-01-01 ENCOUNTER — OFFICE VISIT (OUTPATIENT)
Dept: FAMILY MEDICINE CLINIC | Facility: CLINIC | Age: 65
End: 2020-01-01

## 2020-01-01 VITALS
BODY MASS INDEX: 28.8 KG/M2 | TEMPERATURE: 96.9 F | HEART RATE: 77 BPM | WEIGHT: 195 LBS | SYSTOLIC BLOOD PRESSURE: 117 MMHG | DIASTOLIC BLOOD PRESSURE: 58 MMHG | RESPIRATION RATE: 16 BRPM

## 2020-01-01 VITALS
WEIGHT: 189 LBS | OXYGEN SATURATION: 98 % | SYSTOLIC BLOOD PRESSURE: 143 MMHG | DIASTOLIC BLOOD PRESSURE: 88 MMHG | HEIGHT: 69 IN | TEMPERATURE: 97.3 F | HEART RATE: 69 BPM | BODY MASS INDEX: 27.99 KG/M2 | RESPIRATION RATE: 16 BRPM

## 2020-01-01 VITALS
RESPIRATION RATE: 16 BRPM | DIASTOLIC BLOOD PRESSURE: 55 MMHG | BODY MASS INDEX: 28.06 KG/M2 | SYSTOLIC BLOOD PRESSURE: 108 MMHG | TEMPERATURE: 98 F | HEART RATE: 77 BPM | WEIGHT: 190 LBS

## 2020-01-01 VITALS
HEART RATE: 68 BPM | DIASTOLIC BLOOD PRESSURE: 60 MMHG | BODY MASS INDEX: 29.03 KG/M2 | TEMPERATURE: 97.1 F | SYSTOLIC BLOOD PRESSURE: 112 MMHG | OXYGEN SATURATION: 99 % | WEIGHT: 196 LBS | HEIGHT: 69 IN

## 2020-01-01 VITALS
TEMPERATURE: 97.1 F | HEART RATE: 65 BPM | WEIGHT: 191 LBS | DIASTOLIC BLOOD PRESSURE: 62 MMHG | RESPIRATION RATE: 20 BRPM | BODY MASS INDEX: 28.29 KG/M2 | OXYGEN SATURATION: 98 % | SYSTOLIC BLOOD PRESSURE: 128 MMHG | HEIGHT: 69 IN

## 2020-01-01 VITALS
BODY MASS INDEX: 27.56 KG/M2 | OXYGEN SATURATION: 97 % | TEMPERATURE: 97.8 F | DIASTOLIC BLOOD PRESSURE: 72 MMHG | HEART RATE: 65 BPM | WEIGHT: 186.6 LBS | SYSTOLIC BLOOD PRESSURE: 150 MMHG | RESPIRATION RATE: 16 BRPM

## 2020-01-01 VITALS
RESPIRATION RATE: 16 BRPM | BODY MASS INDEX: 28.88 KG/M2 | SYSTOLIC BLOOD PRESSURE: 157 MMHG | HEIGHT: 69 IN | DIASTOLIC BLOOD PRESSURE: 68 MMHG | TEMPERATURE: 96.9 F | OXYGEN SATURATION: 98 % | HEART RATE: 62 BPM | WEIGHT: 195 LBS

## 2020-01-01 VITALS
DIASTOLIC BLOOD PRESSURE: 71 MMHG | TEMPERATURE: 97.6 F | BODY MASS INDEX: 28.28 KG/M2 | WEIGHT: 191.5 LBS | HEART RATE: 63 BPM | RESPIRATION RATE: 16 BRPM | SYSTOLIC BLOOD PRESSURE: 147 MMHG

## 2020-01-01 VITALS
SYSTOLIC BLOOD PRESSURE: 139 MMHG | TEMPERATURE: 98 F | BODY MASS INDEX: 27.98 KG/M2 | WEIGHT: 189.5 LBS | HEART RATE: 66 BPM | DIASTOLIC BLOOD PRESSURE: 67 MMHG

## 2020-01-01 VITALS
SYSTOLIC BLOOD PRESSURE: 156 MMHG | WEIGHT: 187.5 LBS | BODY MASS INDEX: 27.69 KG/M2 | DIASTOLIC BLOOD PRESSURE: 74 MMHG | HEART RATE: 62 BPM | TEMPERATURE: 97.8 F

## 2020-01-01 VITALS
HEART RATE: 69 BPM | HEIGHT: 69 IN | BODY MASS INDEX: 28.58 KG/M2 | WEIGHT: 193 LBS | TEMPERATURE: 97.1 F | SYSTOLIC BLOOD PRESSURE: 137 MMHG | RESPIRATION RATE: 16 BRPM | DIASTOLIC BLOOD PRESSURE: 70 MMHG | OXYGEN SATURATION: 99 %

## 2020-01-01 DIAGNOSIS — Z86.718 HISTORY OF DVT (DEEP VEIN THROMBOSIS): ICD-10-CM

## 2020-01-01 DIAGNOSIS — C90.00 MULTIPLE MYELOMA NOT HAVING ACHIEVED REMISSION (HCC): Primary | ICD-10-CM

## 2020-01-01 DIAGNOSIS — C90.00 MULTIPLE MYELOMA NOT HAVING ACHIEVED REMISSION (HCC): ICD-10-CM

## 2020-01-01 DIAGNOSIS — I10 ESSENTIAL HYPERTENSION: ICD-10-CM

## 2020-01-01 DIAGNOSIS — D69.6 THROMBOCYTOPENIA (HCC): ICD-10-CM

## 2020-01-01 DIAGNOSIS — E11.9 TYPE 2 DIABETES MELLITUS WITHOUT COMPLICATION, WITHOUT LONG-TERM CURRENT USE OF INSULIN (HCC): Primary | ICD-10-CM

## 2020-01-01 DIAGNOSIS — D61.818 PANCYTOPENIA (HCC): ICD-10-CM

## 2020-01-01 DIAGNOSIS — D50.0 IRON DEFICIENCY ANEMIA DUE TO CHRONIC BLOOD LOSS: ICD-10-CM

## 2020-01-01 LAB
ALBUMIN SERPL ELPH-MCNC: 3.1 G/DL (ref 2.9–4.4)
ALBUMIN SERPL ELPH-MCNC: 3.2 G/DL (ref 2.9–4.4)
ALBUMIN SERPL-MCNC: 3.9 G/DL (ref 3.5–5.2)
ALBUMIN SERPL-MCNC: 3.9 G/DL (ref 3.5–5.2)
ALBUMIN/GLOB SERPL: 1.1 {RATIO} (ref 0.7–1.7)
ALBUMIN/GLOB SERPL: 1.1 {RATIO} (ref 0.7–1.7)
ALBUMIN/GLOB SERPL: 1.5 G/DL
ALBUMIN/GLOB SERPL: 1.6 G/DL
ALP SERPL-CCNC: 106 U/L (ref 39–117)
ALP SERPL-CCNC: 71 U/L (ref 39–117)
ALPHA1 GLOB SERPL ELPH-MCNC: 0.3 G/DL (ref 0–0.4)
ALPHA1 GLOB SERPL ELPH-MCNC: 0.4 G/DL (ref 0–0.4)
ALPHA2 GLOB SERPL ELPH-MCNC: 1 G/DL (ref 0.4–1)
ALPHA2 GLOB SERPL ELPH-MCNC: 1.1 G/DL (ref 0.4–1)
ALT SERPL W P-5'-P-CCNC: 19 U/L (ref 1–41)
ALT SERPL W P-5'-P-CCNC: 23 U/L (ref 1–41)
ANION GAP SERPL CALCULATED.3IONS-SCNC: 12.6 MMOL/L (ref 5–15)
ANION GAP SERPL CALCULATED.3IONS-SCNC: 16.2 MMOL/L (ref 5–15)
AST SERPL-CCNC: 15 U/L (ref 1–40)
AST SERPL-CCNC: 22 U/L (ref 1–40)
B-GLOBULIN SERPL ELPH-MCNC: 1 G/DL (ref 0.7–1.3)
B-GLOBULIN SERPL ELPH-MCNC: 1.2 G/DL (ref 0.7–1.3)
BASOPHILS # BLD AUTO: 0 10*3/MM3 (ref 0–0.2)
BASOPHILS # BLD AUTO: 0.01 10*3/MM3 (ref 0–0.2)
BASOPHILS # BLD AUTO: 0.01 10*3/MM3 (ref 0–0.2)
BASOPHILS NFR BLD AUTO: 0 % (ref 0–1.5)
BASOPHILS NFR BLD AUTO: 0.2 % (ref 0–1.5)
BASOPHILS NFR BLD AUTO: 0.3 % (ref 0–1.5)
BILIRUB SERPL-MCNC: 0.4 MG/DL (ref 0–1.2)
BILIRUB SERPL-MCNC: 0.5 MG/DL (ref 0–1.2)
BUN SERPL-MCNC: 32 MG/DL (ref 8–23)
BUN SERPL-MCNC: 36 MG/DL (ref 8–23)
BUN/CREAT SERPL: 21.9 (ref 7–25)
BUN/CREAT SERPL: 27.1 (ref 7–25)
CALCIUM SPEC-SCNC: 8.7 MG/DL (ref 8.6–10.5)
CALCIUM SPEC-SCNC: 8.8 MG/DL (ref 8.6–10.5)
CHLORIDE SERPL-SCNC: 96 MMOL/L (ref 98–107)
CHLORIDE SERPL-SCNC: 99 MMOL/L (ref 98–107)
CO2 SERPL-SCNC: 20.8 MMOL/L (ref 22–29)
CO2 SERPL-SCNC: 22.4 MMOL/L (ref 22–29)
CREAT SERPL-MCNC: 1.33 MG/DL (ref 0.76–1.27)
CREAT SERPL-MCNC: 1.46 MG/DL (ref 0.76–1.27)
DEPRECATED RDW RBC AUTO: 50.7 FL (ref 37–54)
DEPRECATED RDW RBC AUTO: 51 FL (ref 37–54)
DEPRECATED RDW RBC AUTO: 51.6 FL (ref 37–54)
DEPRECATED RDW RBC AUTO: 52.4 FL (ref 37–54)
DEPRECATED RDW RBC AUTO: 54.2 FL (ref 37–54)
EOSINOPHIL # BLD AUTO: 0 10*3/MM3 (ref 0–0.4)
EOSINOPHIL # BLD AUTO: 0.01 10*3/MM3 (ref 0–0.4)
EOSINOPHIL # BLD AUTO: 0.01 10*3/MM3 (ref 0–0.4)
EOSINOPHIL NFR BLD AUTO: 0 % (ref 0.3–6.2)
EOSINOPHIL NFR BLD AUTO: 0.2 % (ref 0.3–6.2)
EOSINOPHIL NFR BLD AUTO: 0.3 % (ref 0.3–6.2)
ERYTHROCYTE [DISTWIDTH] IN BLOOD BY AUTOMATED COUNT: 15.2 % (ref 12.3–15.4)
ERYTHROCYTE [DISTWIDTH] IN BLOOD BY AUTOMATED COUNT: 15.3 % (ref 12.3–15.4)
ERYTHROCYTE [DISTWIDTH] IN BLOOD BY AUTOMATED COUNT: 15.5 % (ref 12.3–15.4)
ERYTHROCYTE [DISTWIDTH] IN BLOOD BY AUTOMATED COUNT: 15.9 % (ref 12.3–15.4)
ERYTHROCYTE [DISTWIDTH] IN BLOOD BY AUTOMATED COUNT: 16.4 % (ref 12.3–15.4)
GAMMA GLOB SERPL ELPH-MCNC: 0.5 G/DL (ref 0.4–1.8)
GAMMA GLOB SERPL ELPH-MCNC: 0.5 G/DL (ref 0.4–1.8)
GFR SERPL CREATININE-BSD FRML MDRD: 48 ML/MIN/1.73
GFR SERPL CREATININE-BSD FRML MDRD: 54 ML/MIN/1.73
GLOBULIN SER-MCNC: 2.9 G/DL (ref 2.2–3.9)
GLOBULIN SER-MCNC: 3.1 G/DL (ref 2.2–3.9)
GLOBULIN UR ELPH-MCNC: 2.5 GM/DL
GLOBULIN UR ELPH-MCNC: 2.6 GM/DL
GLUCOSE SERPL-MCNC: 192 MG/DL (ref 65–99)
GLUCOSE SERPL-MCNC: 242 MG/DL (ref 65–99)
HCT VFR BLD AUTO: 24.1 % (ref 37.5–51)
HCT VFR BLD AUTO: 24.9 % (ref 37.5–51)
HCT VFR BLD AUTO: 26.1 % (ref 37.5–51)
HCT VFR BLD AUTO: 26.2 % (ref 37.5–51)
HCT VFR BLD AUTO: 26.4 % (ref 37.5–51)
HGB BLD-MCNC: 8 G/DL (ref 13–17.7)
HGB BLD-MCNC: 8 G/DL (ref 13–17.7)
HGB BLD-MCNC: 8.5 G/DL (ref 13–17.7)
HGB BLD-MCNC: 8.5 G/DL (ref 13–17.7)
HGB BLD-MCNC: 8.6 G/DL (ref 13–17.7)
IGA SERPL-MCNC: 117 MG/DL (ref 61–437)
IGA SERPL-MCNC: 392 MG/DL (ref 61–437)
IGG SERPL-MCNC: 507 MG/DL (ref 603–1613)
IGG SERPL-MCNC: 536 MG/DL (ref 603–1613)
IGM SERPL-MCNC: 11 MG/DL (ref 20–172)
IGM SERPL-MCNC: 8 MG/DL (ref 20–172)
IMM GRANULOCYTES # BLD AUTO: 0.03 10*3/MM3 (ref 0–0.05)
IMM GRANULOCYTES # BLD AUTO: 0.03 10*3/MM3 (ref 0–0.05)
IMM GRANULOCYTES # BLD AUTO: 0.04 10*3/MM3 (ref 0–0.05)
IMM GRANULOCYTES # BLD AUTO: 0.04 10*3/MM3 (ref 0–0.05)
IMM GRANULOCYTES # BLD AUTO: 0.05 10*3/MM3 (ref 0–0.05)
IMM GRANULOCYTES NFR BLD AUTO: 0.6 % (ref 0–0.5)
IMM GRANULOCYTES NFR BLD AUTO: 0.7 % (ref 0–0.5)
IMM GRANULOCYTES NFR BLD AUTO: 0.9 % (ref 0–0.5)
IMM GRANULOCYTES NFR BLD AUTO: 1.2 % (ref 0–0.5)
IMM GRANULOCYTES NFR BLD AUTO: 1.4 % (ref 0–0.5)
INTERPRETATION SERPL IEP-IMP: ABNORMAL
INTERPRETATION SERPL IEP-IMP: ABNORMAL
KAPPA LC FREE SER-MCNC: 12.6 MG/L (ref 3.3–19.4)
KAPPA LC FREE SER-MCNC: 14.7 MG/L (ref 3.3–19.4)
KAPPA LC FREE/LAMBDA FREE SER: 0.96 {RATIO} (ref 0.26–1.65)
KAPPA LC FREE/LAMBDA FREE SER: 1.11 {RATIO} (ref 0.26–1.65)
LAB AP ASPIRATE SMEAR: NORMAL
LAB AP CASE REPORT: NORMAL
LAB AP CBC AND DIFFERENTIAL: NORMAL
LAB AP CLINICAL INFORMATION: NORMAL
LAB AP CLOT SECTION: NORMAL
LAB AP CORE BIOPSY: NORMAL
LAB AP CYTOGENETICS REPORT,ADDENDUM: NORMAL
LAB AP DIAGNOSIS COMMENT: NORMAL
LAB AP FLOW CYTOMETRY SUMMARY: NORMAL
LAB AP INTEGRATED ONCOLOGY, ADDENDUM: NORMAL
LAB AP OUTSIDE REPORT, ADDENDUM: NORMAL
LABORATORY COMMENT REPORT: ABNORMAL
LABORATORY COMMENT REPORT: ABNORMAL
LAMBDA LC FREE SERPL-MCNC: 13.1 MG/L (ref 5.7–26.3)
LAMBDA LC FREE SERPL-MCNC: 13.2 MG/L (ref 5.7–26.3)
LYMPHOCYTES # BLD AUTO: 0.49 10*3/MM3 (ref 0.7–3.1)
LYMPHOCYTES # BLD AUTO: 0.52 10*3/MM3 (ref 0.7–3.1)
LYMPHOCYTES # BLD AUTO: 0.66 10*3/MM3 (ref 0.7–3.1)
LYMPHOCYTES # BLD AUTO: 0.68 10*3/MM3 (ref 0.7–3.1)
LYMPHOCYTES # BLD AUTO: 0.85 10*3/MM3 (ref 0.7–3.1)
LYMPHOCYTES NFR BLD AUTO: 12.4 % (ref 19.6–45.3)
LYMPHOCYTES NFR BLD AUTO: 13.1 % (ref 19.6–45.3)
LYMPHOCYTES NFR BLD AUTO: 14 % (ref 19.6–45.3)
LYMPHOCYTES NFR BLD AUTO: 18.8 % (ref 19.6–45.3)
LYMPHOCYTES NFR BLD AUTO: 20.3 % (ref 19.6–45.3)
M PROTEIN SERPL ELPH-MCNC: ABNORMAL G/DL
M PROTEIN SERPL ELPH-MCNC: ABNORMAL G/DL
MCH RBC QN AUTO: 29.1 PG (ref 26.6–33)
MCH RBC QN AUTO: 29.2 PG (ref 26.6–33)
MCH RBC QN AUTO: 29.4 PG (ref 26.6–33)
MCH RBC QN AUTO: 29.5 PG (ref 26.6–33)
MCH RBC QN AUTO: 30.3 PG (ref 26.6–33)
MCHC RBC AUTO-ENTMCNC: 32.1 G/DL (ref 31.5–35.7)
MCHC RBC AUTO-ENTMCNC: 32.2 G/DL (ref 31.5–35.7)
MCHC RBC AUTO-ENTMCNC: 32.4 G/DL (ref 31.5–35.7)
MCHC RBC AUTO-ENTMCNC: 33 G/DL (ref 31.5–35.7)
MCHC RBC AUTO-ENTMCNC: 33.2 G/DL (ref 31.5–35.7)
MCV RBC AUTO: 89.4 FL (ref 79–97)
MCV RBC AUTO: 90 FL (ref 79–97)
MCV RBC AUTO: 90.5 FL (ref 79–97)
MCV RBC AUTO: 91.3 FL (ref 79–97)
MCV RBC AUTO: 91.3 FL (ref 79–97)
MONOCYTES # BLD AUTO: 0.21 10*3/MM3 (ref 0.1–0.9)
MONOCYTES # BLD AUTO: 0.21 10*3/MM3 (ref 0.1–0.9)
MONOCYTES # BLD AUTO: 0.28 10*3/MM3 (ref 0.1–0.9)
MONOCYTES # BLD AUTO: 0.36 10*3/MM3 (ref 0.1–0.9)
MONOCYTES # BLD AUTO: 0.37 10*3/MM3 (ref 0.1–0.9)
MONOCYTES NFR BLD AUTO: 4.2 % (ref 5–12)
MONOCYTES NFR BLD AUTO: 6 % (ref 5–12)
MONOCYTES NFR BLD AUTO: 8.2 % (ref 5–12)
MONOCYTES NFR BLD AUTO: 8.4 % (ref 5–12)
MONOCYTES NFR BLD AUTO: 8.6 % (ref 5–12)
NEUTROPHILS NFR BLD AUTO: 2.34 10*3/MM3 (ref 1.7–7)
NEUTROPHILS NFR BLD AUTO: 2.75 10*3/MM3 (ref 1.7–7)
NEUTROPHILS NFR BLD AUTO: 3.24 10*3/MM3 (ref 1.7–7)
NEUTROPHILS NFR BLD AUTO: 3.29 10*3/MM3 (ref 1.7–7)
NEUTROPHILS NFR BLD AUTO: 4.14 10*3/MM3 (ref 1.7–7)
NEUTROPHILS NFR BLD AUTO: 69.8 % (ref 42.7–76)
NEUTROPHILS NFR BLD AUTO: 71.9 % (ref 42.7–76)
NEUTROPHILS NFR BLD AUTO: 78.1 % (ref 42.7–76)
NEUTROPHILS NFR BLD AUTO: 78.3 % (ref 42.7–76)
NEUTROPHILS NFR BLD AUTO: 82.1 % (ref 42.7–76)
NRBC BLD AUTO-RTO: 0 /100 WBC (ref 0–0.2)
PATH REPORT.FINAL DX SPEC: NORMAL
PATH REPORT.GROSS SPEC: NORMAL
PLATELET # BLD AUTO: 133 10*3/MM3 (ref 140–450)
PLATELET # BLD AUTO: 177 10*3/MM3 (ref 140–450)
PLATELET # BLD AUTO: 45 10*3/MM3 (ref 140–450)
PLATELET # BLD AUTO: 66 10*3/MM3 (ref 140–450)
PLATELET # BLD AUTO: 71 10*3/MM3 (ref 140–450)
PMV BLD AUTO: 10.9 FL (ref 6–12)
PMV BLD AUTO: 11.9 FL (ref 6–12)
PMV BLD AUTO: 12.3 FL (ref 6–12)
PMV BLD AUTO: 12.4 FL (ref 6–12)
PMV BLD AUTO: 13.6 FL (ref 6–12)
POTASSIUM SERPL-SCNC: 4.5 MMOL/L (ref 3.5–5.2)
POTASSIUM SERPL-SCNC: 4.7 MMOL/L (ref 3.5–5.2)
PROT SERPL-MCNC: 6 G/DL (ref 6–8.5)
PROT SERPL-MCNC: 6.3 G/DL (ref 6–8.5)
PROT SERPL-MCNC: 6.4 G/DL (ref 6–8.5)
PROT SERPL-MCNC: 6.5 G/DL (ref 6–8.5)
RBC # BLD AUTO: 2.64 10*6/MM3 (ref 4.14–5.8)
RBC # BLD AUTO: 2.75 10*6/MM3 (ref 4.14–5.8)
RBC # BLD AUTO: 2.89 10*6/MM3 (ref 4.14–5.8)
RBC # BLD AUTO: 2.91 10*6/MM3 (ref 4.14–5.8)
RBC # BLD AUTO: 2.92 10*6/MM3 (ref 4.14–5.8)
SODIUM SERPL-SCNC: 133 MMOL/L (ref 136–145)
SODIUM SERPL-SCNC: 134 MMOL/L (ref 136–145)
WBC # BLD AUTO: 3.35 10*3/MM3 (ref 3.4–10.8)
WBC # BLD AUTO: 3.51 10*3/MM3 (ref 3.4–10.8)
WBC # BLD AUTO: 4.21 10*3/MM3 (ref 3.4–10.8)
WBC # BLD AUTO: 4.51 10*3/MM3 (ref 3.4–10.8)
WBC # BLD AUTO: 5.04 10*3/MM3 (ref 3.4–10.8)

## 2020-01-01 PROCEDURE — 25010000002 BORTEZOMIB PER 0.1 MG: Performed by: INTERNAL MEDICINE

## 2020-01-01 PROCEDURE — 96375 TX/PRO/DX INJ NEW DRUG ADDON: CPT

## 2020-01-01 PROCEDURE — 36415 COLL VENOUS BLD VENIPUNCTURE: CPT

## 2020-01-01 PROCEDURE — 25010000002 DIPHENHYDRAMINE PER 50 MG: Performed by: INTERNAL MEDICINE

## 2020-01-01 PROCEDURE — 96401 CHEMO ANTI-NEOPL SQ/IM: CPT

## 2020-01-01 PROCEDURE — 85025 COMPLETE CBC W/AUTO DIFF WBC: CPT

## 2020-01-01 PROCEDURE — 96365 THER/PROPH/DIAG IV INF INIT: CPT

## 2020-01-01 PROCEDURE — 82784 ASSAY IGA/IGD/IGG/IGM EACH: CPT

## 2020-01-01 PROCEDURE — 99214 OFFICE O/P EST MOD 30 MIN: CPT | Performed by: INTERNAL MEDICINE

## 2020-01-01 PROCEDURE — 25010000002 DARATUMUMAB-HYALURONIDASE-FIHJ 1800-30000 MG-UT/15ML SOLUTION: Performed by: INTERNAL MEDICINE

## 2020-01-01 PROCEDURE — 86334 IMMUNOFIX E-PHORESIS SERUM: CPT

## 2020-01-01 PROCEDURE — C9062 DARATUMUMAB HYALURONIDASE: HCPCS | Performed by: INTERNAL MEDICINE

## 2020-01-01 PROCEDURE — 80053 COMPREHEN METABOLIC PANEL: CPT

## 2020-01-01 PROCEDURE — 25010000002 METHYLPREDNISOLONE PER 125 MG: Performed by: INTERNAL MEDICINE

## 2020-01-01 PROCEDURE — 96360 HYDRATION IV INFUSION INIT: CPT

## 2020-01-01 PROCEDURE — 83883 ASSAY NEPHELOMETRY NOT SPEC: CPT

## 2020-01-01 PROCEDURE — 96374 THER/PROPH/DIAG INJ IV PUSH: CPT

## 2020-01-01 PROCEDURE — 99214 OFFICE O/P EST MOD 30 MIN: CPT | Performed by: FAMILY MEDICINE

## 2020-01-01 PROCEDURE — 96376 TX/PRO/DX INJ SAME DRUG ADON: CPT

## 2020-01-01 PROCEDURE — 84165 PROTEIN E-PHORESIS SERUM: CPT

## 2020-01-01 PROCEDURE — 99215 OFFICE O/P EST HI 40 MIN: CPT | Performed by: INTERNAL MEDICINE

## 2020-01-01 PROCEDURE — 96361 HYDRATE IV INFUSION ADD-ON: CPT

## 2020-01-01 RX ORDER — LENALIDOMIDE 10 MG/1
10 CAPSULE ORAL DAILY
Qty: 14 CAPSULE | Refills: 0 | Status: SHIPPED | OUTPATIENT
Start: 2020-01-01 | End: 2020-01-01

## 2020-01-01 RX ORDER — ACETAMINOPHEN 500 MG
1000 TABLET ORAL ONCE
Status: COMPLETED | OUTPATIENT
Start: 2020-01-01 | End: 2020-01-01

## 2020-01-01 RX ORDER — DEXAMETHASONE 4 MG/1
TABLET ORAL
Qty: 48 TABLET | Refills: 11 | Status: SHIPPED | OUTPATIENT
Start: 2020-01-01

## 2020-01-01 RX ORDER — BORTEZOMIB 3.5 MG/1
1.3 INJECTION, POWDER, LYOPHILIZED, FOR SOLUTION INTRAVENOUS; SUBCUTANEOUS ONCE
Status: COMPLETED | OUTPATIENT
Start: 2020-01-01 | End: 2020-01-01

## 2020-01-01 RX ORDER — BORTEZOMIB 3.5 MG/1
1.3 INJECTION, POWDER, LYOPHILIZED, FOR SOLUTION INTRAVENOUS; SUBCUTANEOUS ONCE
Status: CANCELLED | OUTPATIENT
Start: 2020-01-01

## 2020-01-01 RX ORDER — DEXAMETHASONE 4 MG/1
TABLET ORAL
Qty: 60 TABLET | Refills: 10 | Status: SHIPPED | OUTPATIENT
Start: 2020-01-01 | End: 2020-01-01

## 2020-01-01 RX ORDER — FAMOTIDINE 10 MG/ML
20 INJECTION, SOLUTION INTRAVENOUS AS NEEDED
Status: CANCELLED | OUTPATIENT
Start: 2020-01-01

## 2020-01-01 RX ORDER — LENALIDOMIDE 10 MG/1
CAPSULE ORAL
Refills: 0 | Status: CANCELLED | OUTPATIENT
Start: 2020-01-01

## 2020-01-01 RX ORDER — SODIUM CHLORIDE 9 MG/ML
250 INJECTION, SOLUTION INTRAVENOUS ONCE
Status: DISCONTINUED | OUTPATIENT
Start: 2020-01-01 | End: 2020-01-01 | Stop reason: HOSPADM

## 2020-01-01 RX ORDER — DIPHENHYDRAMINE HYDROCHLORIDE 50 MG/ML
50 INJECTION INTRAMUSCULAR; INTRAVENOUS AS NEEDED
Status: CANCELLED | OUTPATIENT
Start: 2020-01-01

## 2020-01-01 RX ORDER — INSULIN HUMAN 100 [IU]/ML
INJECTION, SOLUTION PARENTERAL
COMMUNITY
Start: 2020-10-01 | End: 2021-01-01

## 2020-01-01 RX ORDER — FAMOTIDINE 10 MG/ML
20 INJECTION, SOLUTION INTRAVENOUS AS NEEDED
Status: CANCELLED | OUTPATIENT
Start: 2021-01-01

## 2020-01-01 RX ORDER — ACETAMINOPHEN 500 MG
1000 TABLET ORAL ONCE
Status: CANCELLED | OUTPATIENT
Start: 2020-01-01

## 2020-01-01 RX ORDER — BORTEZOMIB 3.5 MG/1
1.3 INJECTION, POWDER, LYOPHILIZED, FOR SOLUTION INTRAVENOUS; SUBCUTANEOUS ONCE
Status: DISCONTINUED | OUTPATIENT
Start: 2020-01-01 | End: 2020-01-01 | Stop reason: HOSPADM

## 2020-01-01 RX ORDER — MEPERIDINE HYDROCHLORIDE 25 MG/ML
25 INJECTION INTRAMUSCULAR; INTRAVENOUS; SUBCUTANEOUS
Status: CANCELLED | OUTPATIENT
Start: 2021-01-01

## 2020-01-01 RX ORDER — ACYCLOVIR 400 MG/1
400 TABLET ORAL 2 TIMES DAILY
Qty: 60 TABLET | Refills: 11 | Status: SHIPPED | OUTPATIENT
Start: 2020-01-01

## 2020-01-01 RX ORDER — SODIUM CHLORIDE 9 MG/ML
250 INJECTION, SOLUTION INTRAVENOUS ONCE
Status: CANCELLED | OUTPATIENT
Start: 2021-01-01

## 2020-01-01 RX ORDER — METHYLPREDNISOLONE SODIUM SUCCINATE 125 MG/2ML
60 INJECTION, POWDER, LYOPHILIZED, FOR SOLUTION INTRAMUSCULAR; INTRAVENOUS ONCE
Status: COMPLETED | OUTPATIENT
Start: 2020-01-01 | End: 2020-01-01

## 2020-01-01 RX ORDER — ACETAMINOPHEN 500 MG
1000 TABLET ORAL ONCE
Status: CANCELLED | OUTPATIENT
Start: 2021-01-01

## 2020-01-01 RX ORDER — METHYLPREDNISOLONE SODIUM SUCCINATE 125 MG/2ML
60 INJECTION, POWDER, LYOPHILIZED, FOR SOLUTION INTRAMUSCULAR; INTRAVENOUS ONCE
Status: CANCELLED | OUTPATIENT
Start: 2020-01-01

## 2020-01-01 RX ORDER — METHYLPREDNISOLONE SODIUM SUCCINATE 125 MG/2ML
60 INJECTION, POWDER, LYOPHILIZED, FOR SOLUTION INTRAMUSCULAR; INTRAVENOUS ONCE
Status: CANCELLED | OUTPATIENT
Start: 2021-01-01

## 2020-01-01 RX ORDER — GLIMEPIRIDE 2 MG/1
2 TABLET ORAL DAILY
Qty: 90 TABLET | Refills: 1 | Status: SHIPPED | OUTPATIENT
Start: 2020-01-01 | End: 2021-01-01

## 2020-01-01 RX ORDER — SODIUM CHLORIDE 9 MG/ML
250 INJECTION, SOLUTION INTRAVENOUS ONCE
Status: CANCELLED | OUTPATIENT
Start: 2020-01-01

## 2020-01-01 RX ORDER — DIPHENHYDRAMINE HYDROCHLORIDE 50 MG/ML
50 INJECTION INTRAMUSCULAR; INTRAVENOUS AS NEEDED
Status: CANCELLED | OUTPATIENT
Start: 2021-01-01

## 2020-01-01 RX ORDER — SULFAMETHOXAZOLE AND TRIMETHOPRIM 800; 160 MG/1; MG/1
TABLET ORAL
Qty: 60 TABLET | Refills: 11 | Status: SHIPPED | OUTPATIENT
Start: 2020-01-01

## 2020-01-01 RX ORDER — MEPERIDINE HYDROCHLORIDE 50 MG/ML
25 INJECTION INTRAMUSCULAR; INTRAVENOUS; SUBCUTANEOUS
Status: CANCELLED | OUTPATIENT
Start: 2020-01-01

## 2020-01-01 RX ORDER — LENALIDOMIDE 25 MG/1
25 CAPSULE ORAL DAILY
Qty: 21 CAPSULE | Refills: 0 | Status: SHIPPED | OUTPATIENT
Start: 2021-01-01 | End: 2021-01-01

## 2020-01-01 RX ORDER — SODIUM CHLORIDE 9 MG/ML
1000 INJECTION, SOLUTION INTRAVENOUS ONCE
Status: COMPLETED | OUTPATIENT
Start: 2020-01-01 | End: 2020-01-01

## 2020-01-01 RX ORDER — ONDANSETRON HYDROCHLORIDE 8 MG/1
8 TABLET, FILM COATED ORAL EVERY 8 HOURS PRN
Qty: 30 TABLET | Refills: 11 | Status: SHIPPED | OUTPATIENT
Start: 2020-01-01 | End: 2021-01-01

## 2020-01-01 RX ORDER — BLOOD SUGAR DIAGNOSTIC
STRIP MISCELLANEOUS
Qty: 100 EACH | Refills: 11 | Status: SHIPPED | OUTPATIENT
Start: 2020-01-01 | End: 2020-01-01 | Stop reason: SDUPTHER

## 2020-01-01 RX ORDER — BLOOD SUGAR DIAGNOSTIC
1 STRIP MISCELLANEOUS 2 TIMES DAILY
Qty: 100 EACH | Refills: 11 | Status: SHIPPED | OUTPATIENT
Start: 2020-01-01

## 2020-01-01 RX ORDER — DEXAMETHASONE 4 MG/1
TABLET ORAL
Qty: 60 TABLET | Refills: 6 | Status: CANCELLED | OUTPATIENT
Start: 2020-01-01

## 2020-01-01 RX ADMIN — DIPHENHYDRAMINE HYDROCHLORIDE 25 MG: 50 INJECTION INTRAMUSCULAR; INTRAVENOUS at 09:47

## 2020-01-01 RX ADMIN — DARATUMUMAB AND HYALURONIDASE-FIHJ (HUMAN RECOMBINANT) 1800 MG: 1800; 30000 INJECTION SUBCUTANEOUS at 11:24

## 2020-01-01 RX ADMIN — METHYLPREDNISOLONE SODIUM SUCCINATE 60 MG: 125 INJECTION, POWDER, FOR SOLUTION INTRAMUSCULAR; INTRAVENOUS at 09:13

## 2020-01-01 RX ADMIN — SODIUM CHLORIDE 1000 ML/HR: 9 INJECTION, SOLUTION INTRAVENOUS at 09:40

## 2020-01-01 RX ADMIN — BORTEZOMIB 2.6 MG: 3.5 INJECTION, POWDER, LYOPHILIZED, FOR SOLUTION INTRAVENOUS; SUBCUTANEOUS at 11:16

## 2020-01-01 RX ADMIN — BORTEZOMIB 2.6 MG: 3.5 INJECTION, POWDER, LYOPHILIZED, FOR SOLUTION INTRAVENOUS; SUBCUTANEOUS at 10:22

## 2020-01-01 RX ADMIN — DARATUMUMAB AND HYALURONIDASE-FIHJ (HUMAN RECOMBINANT) 1800 MG: 1800; 30000 INJECTION SUBCUTANEOUS at 10:28

## 2020-01-01 RX ADMIN — METHYLPREDNISOLONE SODIUM SUCCINATE 60 MG: 125 INJECTION, POWDER, FOR SOLUTION INTRAMUSCULAR; INTRAVENOUS at 09:26

## 2020-01-01 RX ADMIN — BORTEZOMIB 2.6 MG: 3.5 INJECTION, POWDER, LYOPHILIZED, FOR SOLUTION INTRAVENOUS; SUBCUTANEOUS at 14:04

## 2020-01-01 RX ADMIN — BORTEZOMIB 2.6 MG: 3.5 INJECTION, POWDER, LYOPHILIZED, FOR SOLUTION INTRAVENOUS; SUBCUTANEOUS at 10:00

## 2020-01-01 RX ADMIN — ACETAMINOPHEN 1000 MG: 500 TABLET, FILM COATED ORAL at 09:42

## 2020-01-01 RX ADMIN — DARATUMUMAB AND HYALURONIDASE-FIHJ (HUMAN RECOMBINANT) 1800 MG: 1800; 30000 INJECTION SUBCUTANEOUS at 10:23

## 2020-01-01 RX ADMIN — METHYLPREDNISOLONE SODIUM SUCCINATE 60 MG: 125 INJECTION, POWDER, FOR SOLUTION INTRAMUSCULAR; INTRAVENOUS at 09:59

## 2020-01-01 RX ADMIN — DIPHENHYDRAMINE HYDROCHLORIDE 25 MG: 50 INJECTION INTRAMUSCULAR; INTRAVENOUS at 09:14

## 2020-01-01 RX ADMIN — ACETAMINOPHEN 1000 MG: 500 TABLET, FILM COATED ORAL at 09:13

## 2020-01-01 RX ADMIN — BORTEZOMIB 2.6 MG: 3.5 INJECTION, POWDER, LYOPHILIZED, FOR SOLUTION INTRAVENOUS; SUBCUTANEOUS at 14:39

## 2020-01-01 RX ADMIN — METHYLPREDNISOLONE SODIUM SUCCINATE 60 MG: 125 INJECTION, POWDER, FOR SOLUTION INTRAMUSCULAR; INTRAVENOUS at 09:44

## 2020-01-01 RX ADMIN — BORTEZOMIB 2.6 MG: 3.5 INJECTION, POWDER, LYOPHILIZED, FOR SOLUTION INTRAVENOUS; SUBCUTANEOUS at 15:22

## 2020-01-01 RX ADMIN — DARATUMUMAB AND HYALURONIDASE-FIHJ (HUMAN RECOMBINANT) 1800 MG: 1800; 30000 INJECTION SUBCUTANEOUS at 10:38

## 2020-01-01 RX ADMIN — ACETAMINOPHEN 1000 MG: 500 TABLET, FILM COATED ORAL at 09:25

## 2020-01-01 RX ADMIN — DIPHENHYDRAMINE HYDROCHLORIDE 25 MG: 50 INJECTION INTRAMUSCULAR; INTRAVENOUS at 10:03

## 2020-01-01 RX ADMIN — ACETAMINOPHEN 1000 MG: 500 TABLET, FILM COATED ORAL at 09:58

## 2020-01-01 RX ADMIN — BORTEZOMIB 2.6 MG: 3.5 INJECTION, POWDER, LYOPHILIZED, FOR SOLUTION INTRAVENOUS; SUBCUTANEOUS at 11:25

## 2020-01-01 RX ADMIN — DIPHENHYDRAMINE HYDROCHLORIDE 25 MG: 50 INJECTION INTRAMUSCULAR; INTRAVENOUS at 09:25

## 2020-01-02 ENCOUNTER — OFFICE VISIT (OUTPATIENT)
Dept: NEUROSURGERY | Facility: CLINIC | Age: 65
End: 2020-01-02

## 2020-01-02 VITALS — BODY MASS INDEX: 30.45 KG/M2 | WEIGHT: 205.6 LBS | HEIGHT: 69 IN | TEMPERATURE: 97.7 F

## 2020-01-02 DIAGNOSIS — M96.1 POST LAMINECTOMY SYNDROME: Primary | ICD-10-CM

## 2020-01-02 PROCEDURE — 99213 OFFICE O/P EST LOW 20 MIN: CPT | Performed by: NEUROLOGICAL SURGERY

## 2020-01-02 NOTE — PROGRESS NOTES
Subjective   Gene Bond is a 64 y.o. male who presents for follow up of right leg and foot pain.     The patient is a 64-year-old man who has a complaint of pain in his right thigh and right foot.  He has a past history of ankle injury and surgery with fusion of his right ankle 35 years ago.  He saw Dr. Canchola last year and had an interbody fusion with pedicle screw fixation at 2 levels at L3-4 and L4-5 last March.  He continued to have persistent numbness and pain in his right thigh and foot relieved when he would lie down and worse when he would stand to walk.  He is on Coumadin for hypercoagulability.  He sees Dr. Bala Burgess for pain management.    A lumbar myelogram was performed and shows the interbody graft in proper position.  The pedicle screws at L3, L4 and L5 are all in good position.  There is no evidence of spinal dural or nerve root compression at the surgical site or above or below it.    The following portions of the patient's history were reviewed, updated as appropriate and approved: allergies, current medications, past family history, past medical history, past social history, past surgical history, review of systems and problem list.     Review of Systems   Constitutional: Positive for activity change. Negative for appetite change, chills, diaphoresis, fatigue, fever and unexpected weight change.   HENT: Negative for congestion, dental problem, drooling, ear discharge, ear pain, facial swelling, hearing loss, mouth sores, nosebleeds, postnasal drip, rhinorrhea, sinus pressure, sneezing, sore throat, tinnitus, trouble swallowing and voice change.    Eyes: Negative for photophobia, pain, discharge, redness, itching and visual disturbance.   Respiratory: Negative for apnea, cough, choking, chest tightness, shortness of breath, wheezing and stridor.    Cardiovascular: Negative for chest pain, palpitations and leg swelling.   Gastrointestinal: Negative for abdominal distention, abdominal  pain, anal bleeding, blood in stool, constipation, diarrhea, nausea, rectal pain and vomiting.   Endocrine: Negative for cold intolerance, heat intolerance, polydipsia, polyphagia and polyuria.   Genitourinary: Negative for decreased urine volume, difficulty urinating, dysuria, enuresis, flank pain, frequency, genital sores, hematuria and urgency.   Musculoskeletal: Positive for arthralgias, gait problem, myalgias and neck pain. Negative for back pain, joint swelling and neck stiffness.   Skin: Negative for color change, pallor, rash and wound.   Allergic/Immunologic: Negative for environmental allergies, food allergies and immunocompromised state.   Neurological: Positive for weakness and numbness. Negative for dizziness, tremors, seizures, syncope, facial asymmetry, speech difficulty, light-headedness and headaches.   Hematological: Negative for adenopathy. Does not bruise/bleed easily.   Psychiatric/Behavioral: Negative for agitation, behavioral problems, confusion, decreased concentration, dysphoric mood, hallucinations, self-injury, sleep disturbance and suicidal ideas. The patient is not nervous/anxious and is not hyperactive.        Objective    NEUROLOGICAL EXAMINATION:    Absent dorsiflexion and plantarflexion in the right foot from prior ankle fusion with hypertrophic fusion mass.    Assessment   Right hip and leg pain, no evidence of right lumbar radiculopathy.       Plan   There are no additional surgical treatment plans for the lumbar spine with the relatively normal postoperative findings from his fusion with Dr. Canchola.  I recommend he see Dr. Bala Burgess for advice on pain management.       Rubens Ugarte MD

## 2020-01-17 ENCOUNTER — OFFICE VISIT (OUTPATIENT)
Dept: FAMILY MEDICINE CLINIC | Facility: CLINIC | Age: 65
End: 2020-01-17

## 2020-01-17 VITALS
BODY MASS INDEX: 30.66 KG/M2 | WEIGHT: 207 LBS | DIASTOLIC BLOOD PRESSURE: 84 MMHG | HEIGHT: 69 IN | OXYGEN SATURATION: 95 % | RESPIRATION RATE: 16 BRPM | SYSTOLIC BLOOD PRESSURE: 138 MMHG | HEART RATE: 53 BPM

## 2020-01-17 DIAGNOSIS — I10 ESSENTIAL HYPERTENSION: Primary | ICD-10-CM

## 2020-01-17 DIAGNOSIS — G89.4 CHRONIC PAIN SYNDROME: ICD-10-CM

## 2020-01-17 DIAGNOSIS — E78.2 MIXED HYPERLIPIDEMIA: ICD-10-CM

## 2020-01-17 DIAGNOSIS — E11.9 TYPE 2 DIABETES MELLITUS WITHOUT COMPLICATION, WITHOUT LONG-TERM CURRENT USE OF INSULIN (HCC): ICD-10-CM

## 2020-01-17 DIAGNOSIS — K21.9 GASTROESOPHAGEAL REFLUX DISEASE, ESOPHAGITIS PRESENCE NOT SPECIFIED: ICD-10-CM

## 2020-01-17 DIAGNOSIS — J44.9 CHRONIC OBSTRUCTIVE PULMONARY DISEASE, UNSPECIFIED COPD TYPE (HCC): ICD-10-CM

## 2020-01-17 DIAGNOSIS — I25.10 CORONARY ARTERY DISEASE INVOLVING NATIVE HEART WITHOUT ANGINA PECTORIS, UNSPECIFIED VESSEL OR LESION TYPE: ICD-10-CM

## 2020-01-17 PROCEDURE — 99214 OFFICE O/P EST MOD 30 MIN: CPT | Performed by: FAMILY MEDICINE

## 2020-01-20 NOTE — PROGRESS NOTES
Subjective   Gene Bond is a 64 y.o. male    Chief Complaint    High blood pressure  High cholesterol  Diabetes  COPD  Anxiety  GERD      History of Present Illness  Patient presents today with hypertension, hyperlipidemia, diabetes mellitus type 2, coronary artery disease, anticoagulant therapy, COPD, anxiety, and GERD.  He is compliant with his medications.  He is routinely with his anticoagulant monitoring.  He is on warfarin and does home monitoring.  He denies any new complaints today.  He reports that he feels well with no chest pain, shortness of breath, edema, orthopnea or PND.    The following portions of the patient's history were reviewed and updated as appropriate: allergies, current medications, past social history and problem list    Review of Systems   Constitutional: Negative for appetite change, chills, diaphoresis, fatigue, fever and unexpected weight change.   Eyes: Negative for visual disturbance.   Respiratory: Negative for cough, chest tightness, shortness of breath and wheezing.    Cardiovascular: Negative for chest pain, palpitations and leg swelling.   Gastrointestinal: Negative for abdominal pain, diarrhea, nausea and vomiting.   Endocrine: Negative for polydipsia, polyphagia and polyuria.   Genitourinary: Negative for dysuria, frequency and urgency.   Musculoskeletal: Negative for arthralgias, back pain and myalgias.   Skin: Negative for color change and rash.   Neurological: Negative for dizziness, syncope, weakness, light-headedness, numbness and headaches.   Hematological: Negative for adenopathy. Does not bruise/bleed easily.       Objective     Vitals:    01/17/20 1026   BP: 138/84   Pulse: 53   Resp: 16   SpO2: 95%       Physical Exam   Constitutional: He is oriented to person, place, and time. He appears well-developed and well-nourished.   HENT:   Head: Normocephalic.   Mouth/Throat: Oropharynx is clear and moist.   Eyes: Pupils are equal, round, and reactive to light.  Conjunctivae are normal.   Neck: Neck supple. No JVD present. No thyromegaly present.   Cardiovascular: Normal rate, regular rhythm, normal heart sounds, intact distal pulses and normal pulses.   No murmur heard.  Pulmonary/Chest: Effort normal and breath sounds normal. No respiratory distress.   Abdominal: Soft. Bowel sounds are normal. There is no hepatosplenomegaly. There is no tenderness.   Musculoskeletal: He exhibits no edema.   Lymphadenopathy:     He has no cervical adenopathy.   Neurological: He is alert and oriented to person, place, and time. No sensory deficit.   Skin: Skin is warm and dry. No rash noted. He is not diaphoretic.   Psychiatric: He has a normal mood and affect. His behavior is normal.   Nursing note and vitals reviewed.      Assessment/Plan   Problem List Items Addressed This Visit        Cardiovascular and Mediastinum    Mixed hyperlipidemia    HTN (hypertension) - Primary    Coronary artery disease involving native heart without angina pectoris       Respiratory    COPD (chronic obstructive pulmonary disease) (CMS/Formerly [facility])       Endocrine    DM (diabetes mellitus) (CMS/Formerly [facility])      Other Visit Diagnoses     Chronic pain syndrome        Gastroesophageal reflux disease, esophagitis presence not specified            Problem #1 continue statin therapy and low-cholesterol diet  Problem #2 continue antihypertensive regimen without changes as blood pressure is well controlled  Problem #3 continue cardiology follow-up and guidelines  Problem #4 continue current therapy for COPD  Problem #5 continue diabetes medications, diabetic diet and glucose monitoring.  Problem #6 continue pain management protocol  Problem #7 continue PPI therapy.

## 2020-03-23 RX ORDER — LISINOPRIL 10 MG/1
10 TABLET ORAL DAILY
Qty: 90 TABLET | Refills: 3 | Status: SHIPPED | OUTPATIENT
Start: 2020-03-23 | End: 2020-07-20 | Stop reason: SDUPTHER

## 2020-03-23 RX ORDER — ATORVASTATIN CALCIUM 80 MG/1
80 TABLET, FILM COATED ORAL NIGHTLY
Qty: 90 TABLET | Refills: 3 | Status: SHIPPED | OUTPATIENT
Start: 2020-03-23 | End: 2021-01-01

## 2020-03-23 RX ORDER — PANTOPRAZOLE SODIUM 40 MG/1
40 TABLET, DELAYED RELEASE ORAL DAILY
Qty: 90 TABLET | Refills: 3 | Status: SHIPPED | OUTPATIENT
Start: 2020-03-23 | End: 2021-01-01

## 2020-06-22 ENCOUNTER — OFFICE VISIT (OUTPATIENT)
Dept: FAMILY MEDICINE CLINIC | Facility: CLINIC | Age: 65
End: 2020-06-22

## 2020-06-22 VITALS
OXYGEN SATURATION: 99 % | HEART RATE: 68 BPM | HEIGHT: 69 IN | DIASTOLIC BLOOD PRESSURE: 70 MMHG | WEIGHT: 205 LBS | SYSTOLIC BLOOD PRESSURE: 126 MMHG | TEMPERATURE: 98.1 F | BODY MASS INDEX: 30.36 KG/M2

## 2020-06-22 DIAGNOSIS — R21 RASH: ICD-10-CM

## 2020-06-22 DIAGNOSIS — E11.9 TYPE 2 DIABETES MELLITUS WITHOUT COMPLICATION, WITHOUT LONG-TERM CURRENT USE OF INSULIN (HCC): Primary | ICD-10-CM

## 2020-06-22 DIAGNOSIS — S97.81XS: ICD-10-CM

## 2020-06-22 DIAGNOSIS — S97.01XS: ICD-10-CM

## 2020-06-22 DIAGNOSIS — J44.9 CHRONIC OBSTRUCTIVE PULMONARY DISEASE, UNSPECIFIED COPD TYPE (HCC): ICD-10-CM

## 2020-06-22 DIAGNOSIS — S97.101S: ICD-10-CM

## 2020-06-22 DIAGNOSIS — I10 ESSENTIAL HYPERTENSION: ICD-10-CM

## 2020-06-22 LAB
GLUCOSE BLDC GLUCOMTR-MCNC: 90 MG/DL (ref 70–130)
HBA1C MFR BLD: 6.5 %

## 2020-06-22 PROCEDURE — 83036 HEMOGLOBIN GLYCOSYLATED A1C: CPT | Performed by: FAMILY MEDICINE

## 2020-06-22 PROCEDURE — 82962 GLUCOSE BLOOD TEST: CPT | Performed by: FAMILY MEDICINE

## 2020-06-22 PROCEDURE — 99214 OFFICE O/P EST MOD 30 MIN: CPT | Performed by: FAMILY MEDICINE

## 2020-06-22 RX ORDER — LANCETS 33 GAUGE
EACH MISCELLANEOUS
COMMUNITY
End: 2020-06-22 | Stop reason: SDUPTHER

## 2020-06-22 RX ORDER — FLUOCINOLONE ACETONIDE 0.1 MG/ML
SOLUTION TOPICAL 2 TIMES DAILY
COMMUNITY
End: 2020-06-22 | Stop reason: SDUPTHER

## 2020-06-22 RX ORDER — FLUOCINOLONE ACETONIDE 0.1 MG/ML
SOLUTION TOPICAL 2 TIMES DAILY
Qty: 60 ML | Refills: 11 | Status: SHIPPED | OUTPATIENT
Start: 2020-06-22 | End: 2021-01-01

## 2020-06-22 RX ORDER — LANCETS 33 GAUGE
1 EACH MISCELLANEOUS 3 TIMES DAILY
Qty: 100 EACH | Refills: 11 | Status: SHIPPED | OUTPATIENT
Start: 2020-06-22

## 2020-06-22 NOTE — PROGRESS NOTES
Subjective   Gene Bond is a 64 y.o. male    Chief Complaint    Diabetes mellitus  Rash  High blood pressure  COPD   Swelling right foot and ankle      History of Present Illness  Patient presents today with type 2 diabetes mellitus.  He tells me he has been working hard on his diet.  His hemoglobin A1c has dropped down to 6.5 from a previous level of 8.0.  He is commended on his efforts.  Chronic rash of scalp controlled with Lidex solution, needs prescription refill.  History of hypertension appears well-controlled today at 126/70.  Chronic obstructive pulmonary disease stable.  Patient reports that he actually seems to do better when able to get outside and work, as long as it is not too hot outside.  Concerns about intermittent swelling in the right foot and ankle.  Has a remote crush injury to this area.  He reports at times it seems to swell more than others.  He admits that it is probably related to being on his feet longer hours currently than through the winter months.  Swelling tends to usually go down at night when off his feet.    The following portions of the patient's history were reviewed and updated as appropriate: allergies, current medications, past social history and problem list    Review of Systems   Constitutional: Negative for appetite change, diaphoresis, fatigue, fever and unexpected weight change.   Eyes: Negative for visual disturbance.   Respiratory: Negative for cough, chest tightness, shortness of breath and wheezing.    Cardiovascular: Positive for leg swelling. Negative for chest pain and palpitations.   Gastrointestinal: Negative for diarrhea, nausea and vomiting.   Endocrine: Negative for polydipsia, polyphagia and polyuria.   Musculoskeletal: Positive for gait problem and joint swelling. Negative for arthralgias.   Skin: Positive for rash. Negative for color change, pallor and wound.   Allergic/Immunologic: Negative for immunocompromised state.   Neurological: Negative for  dizziness, weakness, light-headedness and numbness.   Hematological: Negative for adenopathy. Does not bruise/bleed easily.       Objective     Vitals:    06/22/20 1408   BP: 126/70   Pulse: 68   Temp: 98.1 °F (36.7 °C)   SpO2: 99%       Physical Exam   Constitutional: He is oriented to person, place, and time. He appears well-developed and well-nourished.   HENT:   Head: Normocephalic and atraumatic.   Eyes: Pupils are equal, round, and reactive to light. Conjunctivae are normal.   Neck: Neck supple. No JVD present. No thyromegaly present.   Cardiovascular: Normal rate, regular rhythm, normal heart sounds and intact distal pulses.   Pulmonary/Chest: Effort normal. He has decreased breath sounds. He has no wheezes. He has no rales.   Abdominal: Soft. Bowel sounds are normal.   Musculoskeletal: He exhibits tenderness and deformity. He exhibits no edema.   Lymphadenopathy:     He has no cervical adenopathy.   Neurological: He is alert and oriented to person, place, and time. No sensory deficit.   Skin: Skin is warm and dry. He is not diaphoretic.   Psychiatric: He has a normal mood and affect. His behavior is normal.   Nursing note and vitals reviewed.      Assessment/Plan   Problem List Items Addressed This Visit        Cardiovascular and Mediastinum    HTN (hypertension)       Respiratory    COPD (chronic obstructive pulmonary disease) (CMS/[facility])       Endocrine    DM (diabetes mellitus) (CMS/[facility]) - Primary    Relevant Orders    POCT Glucose (Completed)    POC Glycosylated Hemoglobin (Hb A1C) (Completed)       Other    Crushing injury of right foot, ankle, and toe      Other Visit Diagnoses     Rash        Relevant Medications    fluocinolone (SYNALAR) 0.01 % external solution        Problem #1 continue current antihypertensive medications.  Problem #2 continue current medicines without changes.  Problem #4 likely long-term damage to vasculature in the foot and ankle related to his previous injury.  Keep elevated  when swelling occurs.

## 2020-07-20 NOTE — TELEPHONE ENCOUNTER
Caller: Gene Bond    Relationship: Self    Best call back number: [phone]    Medication needed:   Requested Prescriptions     Pending Prescriptions Disp Refills   • glimepiride (AMARYL) 2 MG tablet 90 tablet 3     Sig: Take 1 tablet by mouth Daily.   • lisinopril (PRINIVIL,ZESTRIL) 10 MG tablet 90 tablet 3     Sig: Take 1 tablet by mouth Daily.       When do you need the refill by:    What details did the patient provide when requesting the medication:    Does the patient have less than a 3 day supply:  [] Yes  [x] No    What is the patient's preferred pharmacy:    [facility]

## 2020-07-23 RX ORDER — LISINOPRIL 10 MG/1
10 TABLET ORAL DAILY
Qty: 90 TABLET | Refills: 3 | Status: SHIPPED | OUTPATIENT
Start: 2020-07-23 | End: 2021-01-01

## 2020-07-23 RX ORDER — GLIMEPIRIDE 2 MG/1
2 TABLET ORAL DAILY
Qty: 90 TABLET | Refills: 3 | Status: SHIPPED | OUTPATIENT
Start: 2020-07-23 | End: 2020-07-24 | Stop reason: SDUPTHER

## 2020-07-24 RX ORDER — GLIMEPIRIDE 2 MG/1
2 TABLET ORAL DAILY
Qty: 90 TABLET | Refills: 1 | Status: SHIPPED | OUTPATIENT
Start: 2020-07-24 | End: 2020-01-01 | Stop reason: SDUPTHER

## 2020-08-05 ENCOUNTER — OFFICE VISIT (OUTPATIENT)
Dept: FAMILY MEDICINE CLINIC | Facility: CLINIC | Age: 65
End: 2020-08-05

## 2020-08-05 ENCOUNTER — LAB (OUTPATIENT)
Dept: LAB | Facility: HOSPITAL | Age: 65
End: 2020-08-05

## 2020-08-05 VITALS
TEMPERATURE: 97.3 F | OXYGEN SATURATION: 97 % | WEIGHT: 204 LBS | SYSTOLIC BLOOD PRESSURE: 158 MMHG | HEIGHT: 69 IN | DIASTOLIC BLOOD PRESSURE: 82 MMHG | HEART RATE: 69 BPM | BODY MASS INDEX: 30.21 KG/M2

## 2020-08-05 DIAGNOSIS — I10 ESSENTIAL HYPERTENSION: ICD-10-CM

## 2020-08-05 DIAGNOSIS — I25.10 CORONARY ARTERY DISEASE INVOLVING NATIVE HEART WITHOUT ANGINA PECTORIS, UNSPECIFIED VESSEL OR LESION TYPE: ICD-10-CM

## 2020-08-05 DIAGNOSIS — E78.2 MIXED HYPERLIPIDEMIA: ICD-10-CM

## 2020-08-05 DIAGNOSIS — D61.818 PANCYTOPENIA (HCC): ICD-10-CM

## 2020-08-05 DIAGNOSIS — G89.4 CHRONIC PAIN SYNDROME: ICD-10-CM

## 2020-08-05 DIAGNOSIS — E11.9 TYPE 2 DIABETES MELLITUS WITHOUT COMPLICATION, WITHOUT LONG-TERM CURRENT USE OF INSULIN (HCC): ICD-10-CM

## 2020-08-05 DIAGNOSIS — D61.818 PANCYTOPENIA (HCC): Primary | ICD-10-CM

## 2020-08-05 DIAGNOSIS — J44.9 CHRONIC OBSTRUCTIVE PULMONARY DISEASE, UNSPECIFIED COPD TYPE (HCC): ICD-10-CM

## 2020-08-05 LAB
BASOPHILS # BLD MANUAL: 0.08 10*3/MM3 (ref 0–0.2)
BASOPHILS NFR BLD AUTO: 2 % (ref 0–1.5)
DEPRECATED RDW RBC AUTO: 48.7 FL (ref 37–54)
EOSINOPHIL # BLD MANUAL: 0.13 10*3/MM3 (ref 0–0.4)
EOSINOPHIL NFR BLD MANUAL: 3.1 % (ref 0.3–6.2)
ERYTHROCYTE [DISTWIDTH] IN BLOOD BY AUTOMATED COUNT: 14.5 % (ref 12.3–15.4)
HCT VFR BLD AUTO: 22.4 % (ref 37.5–51)
HGB BLD-MCNC: 7.4 G/DL (ref 13–17.7)
LYMPHOCYTES # BLD MANUAL: 0.75 10*3/MM3 (ref 0.7–3.1)
LYMPHOCYTES NFR BLD MANUAL: 18.4 % (ref 19.6–45.3)
LYMPHOCYTES NFR BLD MANUAL: 7.1 % (ref 5–12)
MCH RBC QN AUTO: 30.5 PG (ref 26.6–33)
MCHC RBC AUTO-ENTMCNC: 33 G/DL (ref 31.5–35.7)
MCV RBC AUTO: 92.2 FL (ref 79–97)
MONOCYTES # BLD AUTO: 0.29 10*3/MM3 (ref 0.1–0.9)
NEUTROPHILS # BLD AUTO: 2.78 10*3/MM3 (ref 1.7–7)
NEUTROPHILS NFR BLD MANUAL: 68.4 % (ref 42.7–76)
PLAT MORPH BLD: NORMAL
PLATELET # BLD AUTO: 50 10*3/MM3 (ref 140–450)
PMV BLD AUTO: 13.1 FL (ref 6–12)
RBC # BLD AUTO: 2.43 10*6/MM3 (ref 4.14–5.8)
RBC MORPH BLD: NORMAL
VARIANT LYMPHS NFR BLD MANUAL: 1 % (ref 0–5)
WBC # BLD AUTO: 4.07 10*3/MM3 (ref 3.4–10.8)
WBC MORPH BLD: NORMAL

## 2020-08-05 PROCEDURE — 36415 COLL VENOUS BLD VENIPUNCTURE: CPT

## 2020-08-05 PROCEDURE — 99214 OFFICE O/P EST MOD 30 MIN: CPT | Performed by: FAMILY MEDICINE

## 2020-08-05 PROCEDURE — 85007 BL SMEAR W/DIFF WBC COUNT: CPT

## 2020-08-05 PROCEDURE — 85025 COMPLETE CBC W/AUTO DIFF WBC: CPT

## 2020-08-06 ENCOUNTER — TELEPHONE (OUTPATIENT)
Dept: FAMILY MEDICINE CLINIC | Facility: CLINIC | Age: 65
End: 2020-08-06

## 2020-08-06 NOTE — TELEPHONE ENCOUNTER
----- Message from Gene Bond sent at 8/6/2020  3:31 PM EDT -----  Regarding: Visit Follow-Up Question  Contact: [phone]  I forgot to show you what we believe might be peteciae. I am not sure if I even can get the picture of it on here, but I will keep trying. Also there was a small bump about 1/2 in by 1 inch. On his belly, but it is under the skin. Does this happen from Multiple Myleoma?? We hope it is not this. We want the best cancer Dr. there is  If I do have cancer. Please select the best you know. Even out of state we will go. My wife is making me tell you all this!  The bump is just under the skin. Not painful, just there. Was not there a week ago.

## 2020-08-10 ENCOUNTER — TELEPHONE (OUTPATIENT)
Dept: ONCOLOGY | Facility: CLINIC | Age: 65
End: 2020-08-10

## 2020-08-10 NOTE — TELEPHONE ENCOUNTER
"Called wife back, advised that appt had been moved up to Wed, 08/12 at 11am w/ Dr. Caballero.  Wife voiced understanding, \"we'll sure be there.\"    "

## 2020-08-10 NOTE — TELEPHONE ENCOUNTER
PT'S WIFE, LUIS DOSS, CALLED TO SEE IF PT COULD GET INTO TO THE OFFICE SOONER THAN Friday, 8-14-20. PT WILL GET LIGHT HEADED WHEN HE BENDS OVER. SHE SAID HIS BLOOD WAS LOW AND IS WORRIED.    PLEASE CALL HER BACK AT:  [phone]

## 2020-08-10 NOTE — TELEPHONE ENCOUNTER
Appears from the chart that they have an appointment with Dr. Caballero on Friday of this week.  Not sure what else I can do.

## 2020-08-10 NOTE — TELEPHONE ENCOUNTER
Called and let Nunu know and she states that they are seeing Dr. Caballero on Wednesday instead of friday

## 2020-08-12 ENCOUNTER — TELEPHONE (OUTPATIENT)
Dept: ONCOLOGY | Facility: CLINIC | Age: 65
End: 2020-08-12

## 2020-08-12 ENCOUNTER — CONSULT (OUTPATIENT)
Dept: ONCOLOGY | Facility: CLINIC | Age: 65
End: 2020-08-12

## 2020-08-12 ENCOUNTER — HOSPITAL ENCOUNTER (OUTPATIENT)
Dept: ONCOLOGY | Facility: HOSPITAL | Age: 65
Setting detail: INFUSION SERIES
Discharge: HOME | End: 2020-08-12

## 2020-08-12 ENCOUNTER — LAB (OUTPATIENT)
Dept: LAB | Facility: HOSPITAL | Age: 65
End: 2020-08-12

## 2020-08-12 VITALS
DIASTOLIC BLOOD PRESSURE: 77 MMHG | RESPIRATION RATE: 20 BRPM | TEMPERATURE: 97.5 F | SYSTOLIC BLOOD PRESSURE: 146 MMHG | HEIGHT: 67 IN | BODY MASS INDEX: 31.08 KG/M2 | WEIGHT: 198 LBS | OXYGEN SATURATION: 96 % | HEART RATE: 54 BPM

## 2020-08-12 DIAGNOSIS — D61.818 PANCYTOPENIA (HCC): ICD-10-CM

## 2020-08-12 DIAGNOSIS — D50.0 IRON DEFICIENCY ANEMIA DUE TO CHRONIC BLOOD LOSS: Primary | ICD-10-CM

## 2020-08-12 DIAGNOSIS — D50.0 IRON DEFICIENCY ANEMIA DUE TO CHRONIC BLOOD LOSS: ICD-10-CM

## 2020-08-12 DIAGNOSIS — D61.818 PANCYTOPENIA (HCC): Primary | ICD-10-CM

## 2020-08-12 LAB
ABO GROUP BLD: NORMAL
ALBUMIN SERPL-MCNC: 2.7 G/DL (ref 3.5–5.2)
ALBUMIN/GLOB SERPL: 0.3 G/DL
ALP SERPL-CCNC: 36 U/L (ref 39–117)
ALT SERPL W P-5'-P-CCNC: 23 U/L (ref 1–41)
ANION GAP SERPL CALCULATED.3IONS-SCNC: 17 MMOL/L (ref 5–15)
AST SERPL-CCNC: 37 U/L (ref 1–40)
BILIRUB SERPL-MCNC: 0.6 MG/DL (ref 0–1.2)
BLD GP AB SCN SERPL QL: NEGATIVE
BUN SERPL-MCNC: 30 MG/DL (ref 8–23)
BUN/CREAT SERPL: 14 (ref 7–25)
CALCIUM SPEC-SCNC: 9.3 MG/DL (ref 8.6–10.5)
CHLORIDE SERPL-SCNC: 97 MMOL/L (ref 98–107)
CO2 SERPL-SCNC: 25 MMOL/L (ref 22–29)
CREAT SERPL-MCNC: 2.14 MG/DL (ref 0.76–1.27)
ERYTHROCYTE [DISTWIDTH] IN BLOOD BY AUTOMATED COUNT: 16.9 % (ref 12.3–15.4)
FERRITIN SERPL-MCNC: 81.25 NG/ML (ref 30–400)
GFR SERPL CREATININE-BSD FRML MDRD: 31 ML/MIN/1.73
GLOBULIN UR ELPH-MCNC: 8.1 GM/DL
GLUCOSE SERPL-MCNC: 77 MG/DL (ref 65–99)
HCT VFR BLD AUTO: 21.7 % (ref 37.5–51)
HGB BLD-MCNC: 7.2 G/DL (ref 13–17.7)
IRON 24H UR-MRATE: 48 MCG/DL (ref 59–158)
IRON SATN MFR SERPL: 20 % (ref 20–50)
LYMPHOCYTES # BLD AUTO: 1.1 10*3/MM3 (ref 0.7–3.1)
LYMPHOCYTES NFR BLD AUTO: 32 % (ref 19.6–45.3)
MCH RBC QN AUTO: 30.8 PG (ref 26.6–33)
MCHC RBC AUTO-ENTMCNC: 32.9 G/DL (ref 31.5–35.7)
MCV RBC AUTO: 93.5 FL (ref 79–97)
MONOCYTES # BLD AUTO: 0.3 10*3/MM3 (ref 0.1–0.9)
MONOCYTES NFR BLD AUTO: 7.7 % (ref 5–12)
NEUTROPHILS NFR BLD AUTO: 2.1 10*3/MM3 (ref 1.7–7)
NEUTROPHILS NFR BLD AUTO: 60.3 % (ref 42.7–76)
PLATELET # BLD AUTO: 49 10*3/MM3 (ref 140–450)
PMV BLD AUTO: 8.1 FL (ref 6–12)
POTASSIUM SERPL-SCNC: 5.2 MMOL/L (ref 3.5–5.2)
PROT SERPL-MCNC: 10.8 G/DL (ref 6–8.5)
RBC # BLD AUTO: 2.33 10*6/MM3 (ref 4.14–5.8)
RH BLD: POSITIVE
SODIUM SERPL-SCNC: 139 MMOL/L (ref 136–145)
T&S EXPIRATION DATE: NORMAL
TIBC SERPL-MCNC: 240 MCG/DL (ref 298–536)
TRANSFERRIN SERPL-MCNC: 161 MG/DL (ref 200–360)
WBC # BLD AUTO: 3.4 10*3/MM3 (ref 3.4–10.8)

## 2020-08-12 PROCEDURE — 85025 COMPLETE CBC W/AUTO DIFF WBC: CPT

## 2020-08-12 PROCEDURE — 82728 ASSAY OF FERRITIN: CPT

## 2020-08-12 PROCEDURE — 86900 BLOOD TYPING SEROLOGIC ABO: CPT | Performed by: INTERNAL MEDICINE

## 2020-08-12 PROCEDURE — 36415 COLL VENOUS BLD VENIPUNCTURE: CPT

## 2020-08-12 PROCEDURE — 84466 ASSAY OF TRANSFERRIN: CPT

## 2020-08-12 PROCEDURE — 80053 COMPREHEN METABOLIC PANEL: CPT

## 2020-08-12 PROCEDURE — 86920 COMPATIBILITY TEST SPIN: CPT

## 2020-08-12 PROCEDURE — 99204 OFFICE O/P NEW MOD 45 MIN: CPT | Performed by: INTERNAL MEDICINE

## 2020-08-12 PROCEDURE — 86901 BLOOD TYPING SEROLOGIC RH(D): CPT | Performed by: INTERNAL MEDICINE

## 2020-08-12 PROCEDURE — 86850 RBC ANTIBODY SCREEN: CPT | Performed by: INTERNAL MEDICINE

## 2020-08-12 PROCEDURE — 83540 ASSAY OF IRON: CPT

## 2020-08-12 RX ORDER — SODIUM CHLORIDE 9 MG/ML
250 INJECTION, SOLUTION INTRAVENOUS AS NEEDED
Status: CANCELLED | OUTPATIENT
Start: 2020-08-12

## 2020-08-12 RX ORDER — LIDOCAINE HYDROCHLORIDE 20 MG/ML
5 INJECTION, SOLUTION INFILTRATION; PERINEURAL ONCE
Status: CANCELLED | OUTPATIENT
Start: 2020-08-12 | End: 2020-08-12

## 2020-08-12 NOTE — TELEPHONE ENCOUNTER
Returned call to patient. Informing patient's wife that she can come during visit. Patient's wife stating she understood.

## 2020-08-12 NOTE — PROGRESS NOTES
ID: 65 y.o. year old male from [facility] [address]    PCP: Jono Bolden MD    REFERRING PHYSICIAN: Jono Bolden MD    Reason for Consultation: Severe normocytic anemia and thrombocytopenia    Dear Dr. Bolden    It is a pleasure to meet Mr. Bond today.  He is a very pleasant 65-year-old gentleman who is seeing me in consultation for severe normocytic anemia with significant thrombocytopenia.  He is currently on Coumadin.  His platelet count is 49.  His hemoglobin is dropped to 7.2.  This was close to normal about a year ago.  His numbers have slowly declined over the last year.  It appears to be normocytic.  He does not have any issues with infections.  Does not have any issues with autoimmune process.  He does have an underlying hypercoagulable state for which he is on indefinite Coumadin.  He has become increasingly fatigued over the last year.            Past Medical History:   Diagnosis Date   • Arthritis    • Blood clot in vein     Right leg and heart   • Blood clotting disorder (CMS/HCC)    • CHF (congestive heart failure) (CMS/HCC)    • COPD (chronic obstructive pulmonary disease) (CMS/HCC)    • Coronary artery disease    • Diabetes mellitus (CMS/HCC)     check sugar once oer day   • Dizzy    • DVT (deep venous thrombosis) (CMS/HCC)    • Full dentures    • GERD (gastroesophageal reflux disease)    • Heart attack (CMS/HCC)    • Hx MRSA infection     right arm- 20 years ago- txed    • Hyperlipidemia    • Hypertension    • Migraine    • Neck pain     into both arms   • Numbness and tingling in both hands     from neck issues    • CAMERON treated with BiPAP     setting exhale 10-20 inhale auto 25    • Wears glasses        Past Surgical History:   Procedure Laterality Date   • ANKLE OPEN REDUCTION INTERNAL FIXATION     • ANTERIOR CERVICAL DISCECTOMY W/ FUSION N/A 9/25/2018    Procedure: CERVICAL DISCECTOMY ANTERIOR WITH FUSION C4-5 WITH CAGE AND ANTERIOR PLATING;  Surgeon: Aleksey Suarez  MD Gio;  Location:  [facility] OR;  Service: Orthopedic Spine   • APPENDECTOMY     • COLONOSCOPY  ~2014   • CORONARY ANGIOPLASTY WITH STENT PLACEMENT      x1   • ENDOSCOPY     • INTERVENTIONAL RADIOLOGY PROCEDURE N/A 12/27/2019    Procedure: myelogram lumbar spine;  Surgeon: Ajit Barnes MD;  Location:  [facility] CATH INVASIVE [facility]ATION;  Service: Interventional Radiology   • JOINT REPLACEMENT      bilat knee    • LUMBAR FUSION  03/26/2019    L3-5 fusion Dr. Canchola   • NECK SURGERY         Social History     Socioeconomic History   • Marital status:      Spouse name: Not on file   • Number of children: Not on file   • Years of education: Not on file   • Highest education level: Not on file   Tobacco Use   • Smoking status: Former Smoker     Packs/day: 1.00     Years: 30.00     Pack years: 30.00     Types: Cigarettes     Last attempt to quit: 2005     Years since quitting: 15.6   • Smokeless tobacco: Never Used   Substance and Sexual Activity   • Alcohol use: No   • Drug use: No   • Sexual activity: Defer       Family History   Problem Relation Age of Onset   • Osteoarthritis Mother    • Diabetes Mother    • Osteoarthritis Father        Review of Systems:    16 point review of systems was performed and reviewed and scanned into the EMR    Review of Systems   Constitutional: Positive for fatigue.   HENT:  Negative.    Eyes: Negative.    Respiratory: Positive for shortness of breath.    Cardiovascular: Negative.    Gastrointestinal: Negative.    Endocrine: Negative.    Genitourinary: Negative.     Musculoskeletal: Negative.    Skin: Negative.    Neurological: Negative.    Hematological: Negative.    Psychiatric/Behavioral: Negative.          Current Outpatient Medications:   •  aspirin 81 MG EC tablet, Take 81 mg by mouth Daily., Disp: , Rfl:   •  atorvastatin (LIPITOR) 80 MG tablet, TAKE 1 TABLET BY MOUTH  EVERY NIGHT, Disp: 90 tablet, Rfl: 3  •  fluocinolone (SYNALAR) 0.01 % external solution, Apply  topically  to the appropriate area as directed 2 (Two) Times a Day., Disp: 60 mL, Rfl: 11  •  gabapentin (NEURONTIN) 800 MG tablet, Take 800 mg by mouth 3 (Three) Times a Day., Disp: , Rfl:   •  glimepiride (AMARYL) 2 MG tablet, Take 1 tablet by mouth Daily., Disp: 90 tablet, Rfl: 1  •  glucose blood test strip, Use to test blood sugar two times daily., Disp: 100 each, Rfl: 12  •  lisinopril (PRINIVIL,ZESTRIL) 10 MG tablet, Take 1 tablet by mouth Daily., Disp: 90 tablet, Rfl: 3  •  metFORMIN (GLUCOPHAGE) 1000 MG tablet, TAKE 1 TABLET BY MOUTH TWO  TIMES DAILY WITH MEALS, Disp: 180 tablet, Rfl: 3  •  methadone (DOLOPHINE) 10 MG tablet, Take 30 mg by mouth Every 6 (Six) Hours As Needed for Moderate Pain ,, Disp: , Rfl:   •  metoprolol tartrate (LOPRESSOR) 25 MG tablet, TAKE 1 TABLET BY MOUTH TWO  TIMES DAILY, Disp: 180 tablet, Rfl: 3  •  OneTouch Delica Lancets 33G misc, 1 application 3 (Three) Times a Day. Check blood sugar three times daily, E11.9, Disp: 100 each, Rfl: 11  •  pantoprazole (PROTONIX) 40 MG EC tablet, TAKE 1 TABLET BY MOUTH  DAILY, Disp: 90 tablet, Rfl: 3  •  tiZANidine (ZANAFLEX) 4 MG tablet, Take 4 mg by mouth 3 (Three) Times a Day As Needed for Muscle Spasms., Disp: , Rfl:   •  warfarin (COUMADIN) 4 MG tablet, Take 1 tablet by mouth Daily. Resume 10/1/18. 4mg daily but Take 7.5 tablet usually on Friday one time dose, Disp: 90 tablet, Rfl: 1    Pain Medications             aspirin 81 MG EC tablet Take 81 mg by mouth Daily.    gabapentin (NEURONTIN) 800 MG tablet Take 800 mg by mouth 3 (Three) Times a Day.    methadone (DOLOPHINE) 10 MG tablet Take 30 mg by mouth Every 6 (Six) Hours As Needed for Moderate Pain ,    tiZANidine (ZANAFLEX) 4 MG tablet Take 4 mg by mouth 3 (Three) Times a Day As Needed for Muscle Spasms.           No Known Allergies    ECOG SCORE: 0    Objective     Vitals:    08/12/20 1031   BP: 146/77   Pulse: 54   Resp: 20   Temp: 97.5 °F (36.4 °C)   SpO2: 96%     Body mass index is 31.01 kg/m².   Body surface area is 2.01 meters squared.        08/12/20  1031   Weight: 89.8 kg (198 lb)     Pain Score    08/12/20 1031   PainSc: 0-No pain          Physical Exam    General: well appearing, in no acute distress  HEENT: sclera anicteric, oropharynx clear, neck is supple  Lymphatics: no cervical, supraclavicular, or axillary adenopathy  Cardiovascular: regular rate and rhythm, no murmurs, rubs or gallops  Lungs: clear to auscultation bilaterally  Abdomen: soft, nontender, nondistended.  No palpable organomegaly  Extremities: no lower extremity edema  Skin: no rashes, lesions, bruising, or petechiae  Msk:  Shows no weakness of the large muscle groups  Psych: Mood is stable        Lab Results   Component Value Date    GLUCOSE 101 (H) 10/18/2019    BUN 10 10/18/2019    CREATININE 0.97 10/18/2019     10/18/2019    K 4.5 10/18/2019    CL 97 (L) 10/18/2019    CO2 29.2 (H) 10/18/2019    CALCIUM 9.0 10/18/2019    PROTEINTOT 8.2 10/18/2019    ALBUMIN 4.20 10/18/2019    BILITOT 0.4 10/18/2019    ALKPHOS 67 10/18/2019    AST 14 10/18/2019    ALT 13 10/18/2019       Lab Results   Component Value Date    HGB 7.2 (L) 08/12/2020    HCT 21.7 (L) 08/12/2020    MCV 93.5 08/12/2020    PLT 49 (L) 08/12/2020    WBC 3.40 08/12/2020    NEUTROABS 2.10 08/12/2020    LYMPHSABS 1.10 08/12/2020    MONOSABS 0.30 08/12/2020    EOSABS 0.13 08/05/2020    BASOSABS 0.08 08/05/2020           Assessment/Plan      1.  Severe thrombocytopenia and anemia.  I am going to start the work-up on the cause of this.  Obviously autoimmune processes can cause this.  But I suspect he has underlying myelodysplasia that is occurring.  We will get a bone marrow him tomorrow.  His hemoglobin is also fairly low and I am going to give him 1 unit of blood tomorrow.  I will see him back in my clinic about 1 week after  has bone marrow biopsy to go over the results of his biopsy.  If he has underlying myelodysplasia then likely he will need a HypoMethylating  Agent.    I spent a total of 45 minutes in direct patient care, greater than 30 minutes (greater than 50%) were spent in coordination of care, and counseling the patient regarding  Pancytopenia . Answered any questions patient had with the plan.      Thank you for allowing me to participate in the care of this patient.    Yours sincerely,    Merry Pelletier MD  [facility]  Hematology and Oncology    Return on: 08/21/20  Return in (Approximately): 1 week    Orders Placed This Encounter   Procedures   • Erythropoietin     Standing Status:   Future     Standing Expiration Date:   8/12/2021   • Folate     Standing Status:   Future     Standing Expiration Date:   8/12/2021   • Haptoglobin     Standing Status:   Future     Standing Expiration Date:   8/12/2021   • Vitamin B12     Standing Status:   Future     Standing Expiration Date:   8/12/2021   • Reticulocytes     Standing Status:   Future     Standing Expiration Date:   8/12/2021   • Methylmalonic Acid, Serum     Standing Status:   Future     Standing Expiration Date:   8/12/2021   • Nuclear Antigen Antibody, IFA     Standing Status:   Future     Standing Expiration Date:   8/12/2021

## 2020-08-12 NOTE — TELEPHONE ENCOUNTER
Patient has a new patient appt today and wife wants to know if she can come to appt patient is very forgetful           Call patient back at [phone]

## 2020-08-13 ENCOUNTER — HOSPITAL ENCOUNTER (OUTPATIENT)
Dept: ONCOLOGY | Facility: HOSPITAL | Age: 65
Discharge: HOME | End: 2020-08-13
Admitting: INTERNAL MEDICINE

## 2020-08-13 ENCOUNTER — TELEPHONE (OUTPATIENT)
Dept: ONCOLOGY | Facility: CLINIC | Age: 65
End: 2020-08-13

## 2020-08-13 ENCOUNTER — PROCEDURE VISIT (OUTPATIENT)
Dept: ONCOLOGY | Facility: CLINIC | Age: 65
End: 2020-08-13

## 2020-08-13 VITALS
SYSTOLIC BLOOD PRESSURE: 147 MMHG | RESPIRATION RATE: 18 BRPM | HEART RATE: 59 BPM | DIASTOLIC BLOOD PRESSURE: 76 MMHG | TEMPERATURE: 98.9 F

## 2020-08-13 DIAGNOSIS — D61.818 PANCYTOPENIA (HCC): ICD-10-CM

## 2020-08-13 DIAGNOSIS — M50.11 DISORDER OF INTERVERTEBRAL DISC OF HIGH CERVICAL REGION WITH RADICULOPATHY: Primary | ICD-10-CM

## 2020-08-13 DIAGNOSIS — D50.0 IRON DEFICIENCY ANEMIA DUE TO CHRONIC BLOOD LOSS: ICD-10-CM

## 2020-08-13 LAB
BASOPHILS # BLD MANUAL: 0 10*3/MM3 (ref 0–0.2)
BASOPHILS NFR BLD AUTO: 0 % (ref 0–1.5)
DEPRECATED RDW RBC AUTO: 62.4 FL (ref 37–54)
EOSINOPHIL # BLD MANUAL: 0.03 10*3/MM3 (ref 0–0.4)
EOSINOPHIL NFR BLD MANUAL: 1 % (ref 0.3–6.2)
ERYTHROCYTE [DISTWIDTH] IN BLOOD BY AUTOMATED COUNT: 17.2 % (ref 12.3–15.4)
FOLATE SERPL-MCNC: 11.7 NG/ML (ref 4.78–24.2)
HAPTOGLOB SERPL-MCNC: 23 MG/DL (ref 30–200)
HCT VFR BLD AUTO: 23.9 % (ref 37.5–51)
HGB BLD-MCNC: 7.4 G/DL (ref 13–17.7)
LYMPHOCYTES # BLD MANUAL: 0.92 10*3/MM3 (ref 0.7–3.1)
LYMPHOCYTES NFR BLD MANUAL: 33 % (ref 19.6–45.3)
LYMPHOCYTES NFR BLD MANUAL: 8 % (ref 5–12)
MCH RBC QN AUTO: 30.3 PG (ref 26.6–33)
MCHC RBC AUTO-ENTMCNC: 31 G/DL (ref 31.5–35.7)
MCV RBC AUTO: 98 FL (ref 79–97)
MONOCYTES # BLD AUTO: 0.22 10*3/MM3 (ref 0.1–0.9)
NEUTROPHILS # BLD AUTO: 1.62 10*3/MM3 (ref 1.7–7)
NEUTROPHILS NFR BLD MANUAL: 52 % (ref 42.7–76)
NEUTS BAND NFR BLD MANUAL: 6 % (ref 0–5)
PLAT MORPH BLD: NORMAL
PLATELET # BLD AUTO: 49 10*3/MM3 (ref 140–450)
PMV BLD AUTO: 12.8 FL (ref 6–12)
RBC # BLD AUTO: 2.44 10*6/MM3 (ref 4.14–5.8)
RBC MORPH BLD: NORMAL
RETICS # AUTO: 0.02 10*6/MM3 (ref 0.02–0.13)
RETICS/RBC NFR AUTO: 0.72 % (ref 0.7–1.9)
VIT B12 BLD-MCNC: 398 PG/ML (ref 211–946)
WBC # BLD AUTO: 2.8 10*3/MM3 (ref 3.4–10.8)
WBC MORPH BLD: NORMAL

## 2020-08-13 PROCEDURE — 88313 SPECIAL STAINS GROUP 2: CPT | Performed by: INTERNAL MEDICINE

## 2020-08-13 PROCEDURE — 82668 ASSAY OF ERYTHROPOIETIN: CPT | Performed by: INTERNAL MEDICINE

## 2020-08-13 PROCEDURE — 82607 VITAMIN B-12: CPT | Performed by: INTERNAL MEDICINE

## 2020-08-13 PROCEDURE — 38220 DX BONE MARROW ASPIRATIONS: CPT

## 2020-08-13 PROCEDURE — 88311 DECALCIFY TISSUE: CPT | Performed by: INTERNAL MEDICINE

## 2020-08-13 PROCEDURE — 38222 DX BONE MARROW BX & ASPIR: CPT | Performed by: INTERNAL MEDICINE

## 2020-08-13 PROCEDURE — 88275 CYTOGENETICS 100-300: CPT

## 2020-08-13 PROCEDURE — 86038 ANTINUCLEAR ANTIBODIES: CPT | Performed by: INTERNAL MEDICINE

## 2020-08-13 PROCEDURE — 88365 INSITU HYBRIDIZATION (FISH): CPT | Performed by: INTERNAL MEDICINE

## 2020-08-13 PROCEDURE — 86900 BLOOD TYPING SEROLOGIC ABO: CPT

## 2020-08-13 PROCEDURE — 85097 BONE MARROW INTERPRETATION: CPT | Performed by: INTERNAL MEDICINE

## 2020-08-13 PROCEDURE — 88264 CHROMOSOME ANALYSIS 20-25: CPT

## 2020-08-13 PROCEDURE — 85025 COMPLETE CBC W/AUTO DIFF WBC: CPT | Performed by: INTERNAL MEDICINE

## 2020-08-13 PROCEDURE — 83921 ORGANIC ACID SINGLE QUANT: CPT | Performed by: INTERNAL MEDICINE

## 2020-08-13 PROCEDURE — P9016 RBC LEUKOCYTES REDUCED: HCPCS

## 2020-08-13 PROCEDURE — 99212-NC PR NO CHARGE CBC OFFICE OUTPATIENT VISIT 10 MINUTES: Performed by: INTERNAL MEDICINE

## 2020-08-13 PROCEDURE — 88237 TISSUE CULTURE BONE MARROW: CPT

## 2020-08-13 PROCEDURE — 88305 TISSUE EXAM BY PATHOLOGIST: CPT | Performed by: INTERNAL MEDICINE

## 2020-08-13 PROCEDURE — 36430 TRANSFUSION BLD/BLD COMPNT: CPT

## 2020-08-13 PROCEDURE — 85045 AUTOMATED RETICULOCYTE COUNT: CPT | Performed by: INTERNAL MEDICINE

## 2020-08-13 PROCEDURE — 88185 FLOWCYTOMETRY/TC ADD-ON: CPT

## 2020-08-13 PROCEDURE — 82746 ASSAY OF FOLIC ACID SERUM: CPT | Performed by: INTERNAL MEDICINE

## 2020-08-13 PROCEDURE — 88184 FLOWCYTOMETRY/ TC 1 MARKER: CPT

## 2020-08-13 PROCEDURE — 85007 BL SMEAR W/DIFF WBC COUNT: CPT | Performed by: INTERNAL MEDICINE

## 2020-08-13 PROCEDURE — 88341 IMHCHEM/IMCYTCHM EA ADD ANTB: CPT | Performed by: INTERNAL MEDICINE

## 2020-08-13 PROCEDURE — 83010 ASSAY OF HAPTOGLOBIN QUANT: CPT | Performed by: INTERNAL MEDICINE

## 2020-08-13 PROCEDURE — 88271 CYTOGENETICS DNA PROBE: CPT

## 2020-08-13 PROCEDURE — 88342 IMHCHEM/IMCYTCHM 1ST ANTB: CPT | Performed by: INTERNAL MEDICINE

## 2020-08-13 PROCEDURE — 88364 INSITU HYBRIDIZATION (FISH): CPT | Performed by: INTERNAL MEDICINE

## 2020-08-13 RX ORDER — SODIUM CHLORIDE 9 MG/ML
250 INJECTION, SOLUTION INTRAVENOUS AS NEEDED
Status: DISCONTINUED | OUTPATIENT
Start: 2020-08-13 | End: 2020-08-14 | Stop reason: HOSPADM

## 2020-08-13 RX ORDER — LIDOCAINE HYDROCHLORIDE 20 MG/ML
5 INJECTION, SOLUTION INFILTRATION; PERINEURAL ONCE
Status: DISCONTINUED | OUTPATIENT
Start: 2020-08-13 | End: 2020-08-14 | Stop reason: HOSPADM

## 2020-08-13 NOTE — TELEPHONE ENCOUNTER
Returned call to patient informing him that Dr. Caballero has his patient start back on their coumadin the night after their procedure.

## 2020-08-13 NOTE — PROGRESS NOTES
============    BONE MARROW PROCEDURE      =========    Indication:  pancytopenia      Procedure: Bone marrow biopsy and aspirate      Patient was consented.  Consent was signed and placed on the chart.  Timeout was performed prior to the procedure.      Patient was laid on the left lateral decubitus position.  The right iliac crest was identified and prepped with iodine.  1% lidocaine was used to anesthetize the skin surface and the iliac crest.  A knife stick was performed, and the Jamshidi needle was inserted and anchored in the iliac crest.  A bone marrow aspirate was performed without difficulty applying suction with a 20 cc syringe.  The needle was repositioned and advanced into the iliac crest to obtain a bone marrow biopsy.    Patient tolerated the procedure well with minimal discomfort and will follow up with me in 1 week for the results.  He is discharged home with his caregiver.

## 2020-08-13 NOTE — TELEPHONE ENCOUNTER
----- Message from Maria L Perez RN sent at 8/13/2020  9:09 AM EDT -----      Critical Test Results      MD: Federico    Date: 8/13/2020     Critical test result: PLT 49    Time results received: 9:09

## 2020-08-13 NOTE — TELEPHONE ENCOUNTER
PT HAD IS HIS BONE MARROW BIOPSY DONE TODAY ALONG WITH 1 UNIT OF BLOOD.    PT STOPPED TAKING HIS COUMADIN FOR TWO DAYS. HE NEEDS TO KNOW WHEN HE SHOULD START TAKING IT AGAIN.     BEST CALL BACK # [phone]

## 2020-08-14 LAB
BH BB BLOOD EXPIRATION DATE: NORMAL
BH BB BLOOD TYPE BARCODE: 6200
BH BB DISPENSE STATUS: NORMAL
BH BB PRODUCT CODE: NORMAL
BH BB UNIT NUMBER: NORMAL
CROSSMATCH INTERPRETATION: NORMAL
ETHNIC BACKGROUND STATED: 82.9 MIU/ML (ref 2.6–18.5)
UNIT  ABO: NORMAL
UNIT  RH: NORMAL

## 2020-08-16 LAB — ANA SER QL IA: NEGATIVE

## 2020-08-17 LAB
Lab: NORMAL
METHYLMALONATE SERPL-SCNC: 305 NMOL/L (ref 0–378)

## 2020-08-18 DIAGNOSIS — C90.00 MULTIPLE MYELOMA NOT HAVING ACHIEVED REMISSION (HCC): Primary | ICD-10-CM

## 2020-08-20 DIAGNOSIS — C90.00 MULTIPLE MYELOMA NOT HAVING ACHIEVED REMISSION (HCC): Primary | ICD-10-CM

## 2020-08-21 ENCOUNTER — OFFICE VISIT (OUTPATIENT)
Dept: ONCOLOGY | Facility: CLINIC | Age: 65
End: 2020-08-21

## 2020-08-21 ENCOUNTER — SPECIALTY PHARMACY (OUTPATIENT)
Dept: ONCOLOGY | Facility: HOSPITAL | Age: 65
End: 2020-08-21

## 2020-08-21 ENCOUNTER — HOSPITAL ENCOUNTER (OUTPATIENT)
Dept: GENERAL RADIOLOGY | Facility: HOSPITAL | Age: 65
Discharge: HOME | End: 2020-08-21
Admitting: INTERNAL MEDICINE

## 2020-08-21 ENCOUNTER — LAB (OUTPATIENT)
Dept: LAB | Facility: HOSPITAL | Age: 65
End: 2020-08-21

## 2020-08-21 VITALS
DIASTOLIC BLOOD PRESSURE: 77 MMHG | RESPIRATION RATE: 16 BRPM | WEIGHT: 197 LBS | OXYGEN SATURATION: 99 % | HEIGHT: 67 IN | HEART RATE: 66 BPM | BODY MASS INDEX: 30.92 KG/M2 | SYSTOLIC BLOOD PRESSURE: 133 MMHG | TEMPERATURE: 96.9 F

## 2020-08-21 DIAGNOSIS — C90.00 MULTIPLE MYELOMA NOT HAVING ACHIEVED REMISSION (HCC): Primary | ICD-10-CM

## 2020-08-21 DIAGNOSIS — C90.00 MULTIPLE MYELOMA NOT HAVING ACHIEVED REMISSION (HCC): ICD-10-CM

## 2020-08-21 LAB
ALBUMIN SERPL-MCNC: 2.9 G/DL (ref 3.5–5.2)
ALBUMIN/GLOB SERPL: 0.4 G/DL
ALP SERPL-CCNC: 32 U/L (ref 39–117)
ALT SERPL W P-5'-P-CCNC: 23 U/L (ref 1–41)
ANION GAP SERPL CALCULATED.3IONS-SCNC: 16.7 MMOL/L (ref 5–15)
AST SERPL-CCNC: 27 U/L (ref 1–40)
B2 MICROGLOB SERPL-MCNC: 16.4 MG/L (ref 0.8–2.2)
BASOPHILS # BLD AUTO: 0.01 10*3/MM3 (ref 0–0.2)
BASOPHILS NFR BLD AUTO: 0.3 % (ref 0–1.5)
BILIRUB SERPL-MCNC: 0.6 MG/DL (ref 0–1.2)
BUN SERPL-MCNC: 31 MG/DL (ref 8–23)
BUN/CREAT SERPL: 14.5 (ref 7–25)
CALCIUM SPEC-SCNC: 9.3 MG/DL (ref 8.6–10.5)
CHLORIDE SERPL-SCNC: 99 MMOL/L (ref 98–107)
CO2 SERPL-SCNC: 27.3 MMOL/L (ref 22–29)
CREAT SERPL-MCNC: 2.14 MG/DL (ref 0.76–1.27)
DEPRECATED RDW RBC AUTO: 60.4 FL (ref 37–54)
EOSINOPHIL # BLD AUTO: 0.04 10*3/MM3 (ref 0–0.4)
EOSINOPHIL NFR BLD AUTO: 1.3 % (ref 0.3–6.2)
ERYTHROCYTE [DISTWIDTH] IN BLOOD BY AUTOMATED COUNT: 16.9 % (ref 12.3–15.4)
GFR SERPL CREATININE-BSD FRML MDRD: 31 ML/MIN/1.73
GLOBULIN UR ELPH-MCNC: 7.9 GM/DL
GLUCOSE SERPL-MCNC: 95 MG/DL (ref 65–99)
HCT VFR BLD AUTO: 23 % (ref 37.5–51)
HGB BLD-MCNC: 7.4 G/DL (ref 13–17.7)
IMM GRANULOCYTES # BLD AUTO: 0.01 10*3/MM3 (ref 0–0.05)
IMM GRANULOCYTES NFR BLD AUTO: 0.3 % (ref 0–0.5)
LDH SERPL-CCNC: 120 U/L (ref 135–225)
LYMPHOCYTES # BLD AUTO: 0.99 10*3/MM3 (ref 0.7–3.1)
LYMPHOCYTES NFR BLD AUTO: 31.2 % (ref 19.6–45.3)
MCH RBC QN AUTO: 31.1 PG (ref 26.6–33)
MCHC RBC AUTO-ENTMCNC: 32.2 G/DL (ref 31.5–35.7)
MCV RBC AUTO: 96.6 FL (ref 79–97)
MONOCYTES # BLD AUTO: 0.41 10*3/MM3 (ref 0.1–0.9)
MONOCYTES NFR BLD AUTO: 12.9 % (ref 5–12)
NEUTROPHILS NFR BLD AUTO: 1.71 10*3/MM3 (ref 1.7–7)
NEUTROPHILS NFR BLD AUTO: 54 % (ref 42.7–76)
NRBC BLD AUTO-RTO: 0 /100 WBC (ref 0–0.2)
PLATELET # BLD AUTO: 52 10*3/MM3 (ref 140–450)
PMV BLD AUTO: 12 FL (ref 6–12)
POTASSIUM SERPL-SCNC: 4.7 MMOL/L (ref 3.5–5.2)
PROT SERPL-MCNC: 10.8 G/DL (ref 6–8.5)
RBC # BLD AUTO: 2.38 10*6/MM3 (ref 4.14–5.8)
SODIUM SERPL-SCNC: 143 MMOL/L (ref 136–145)
WBC # BLD AUTO: 3.17 10*3/MM3 (ref 3.4–10.8)

## 2020-08-21 PROCEDURE — 81403 MOPATH PROCEDURE LEVEL 4: CPT

## 2020-08-21 PROCEDURE — 82784 ASSAY IGA/IGD/IGG/IGM EACH: CPT

## 2020-08-21 PROCEDURE — 86850 RBC ANTIBODY SCREEN: CPT

## 2020-08-21 PROCEDURE — 84165 PROTEIN E-PHORESIS SERUM: CPT

## 2020-08-21 PROCEDURE — 83883 ASSAY NEPHELOMETRY NOT SPEC: CPT

## 2020-08-21 PROCEDURE — 85025 COMPLETE CBC W/AUTO DIFF WBC: CPT

## 2020-08-21 PROCEDURE — 84155 ASSAY OF PROTEIN SERUM: CPT

## 2020-08-21 PROCEDURE — 86900 BLOOD TYPING SEROLOGIC ABO: CPT

## 2020-08-21 PROCEDURE — 99215 OFFICE O/P EST HI 40 MIN: CPT | Performed by: INTERNAL MEDICINE

## 2020-08-21 PROCEDURE — 86901 BLOOD TYPING SEROLOGIC RH(D): CPT

## 2020-08-21 PROCEDURE — 80053 COMPREHEN METABOLIC PANEL: CPT

## 2020-08-21 PROCEDURE — 82232 ASSAY OF BETA-2 PROTEIN: CPT

## 2020-08-21 PROCEDURE — 83615 LACTATE (LD) (LDH) ENZYME: CPT

## 2020-08-21 PROCEDURE — 36415 COLL VENOUS BLD VENIPUNCTURE: CPT

## 2020-08-21 PROCEDURE — 77075 RADEX OSSEOUS SURVEY COMPL: CPT

## 2020-08-21 PROCEDURE — 86334 IMMUNOFIX E-PHORESIS SERUM: CPT

## 2020-08-21 RX ORDER — FAMOTIDINE 10 MG/ML
20 INJECTION, SOLUTION INTRAVENOUS AS NEEDED
Status: CANCELLED | OUTPATIENT
Start: 2020-09-04

## 2020-08-21 RX ORDER — ONDANSETRON HYDROCHLORIDE 8 MG/1
8 TABLET, FILM COATED ORAL 3 TIMES DAILY PRN
Qty: 30 TABLET | Refills: 5 | Status: SHIPPED | OUTPATIENT
Start: 2020-08-21 | End: 2020-08-26 | Stop reason: SDUPTHER

## 2020-08-21 RX ORDER — MEPERIDINE HYDROCHLORIDE 50 MG/ML
25 INJECTION INTRAMUSCULAR; INTRAVENOUS; SUBCUTANEOUS
Status: CANCELLED | OUTPATIENT
Start: 2020-09-11

## 2020-08-21 RX ORDER — SODIUM CHLORIDE 9 MG/ML
250 INJECTION, SOLUTION INTRAVENOUS ONCE
Status: CANCELLED | OUTPATIENT
Start: 2020-09-11

## 2020-08-21 RX ORDER — SULFAMETHOXAZOLE AND TRIMETHOPRIM 800; 160 MG/1; MG/1
1 TABLET ORAL 3 TIMES WEEKLY
Qty: 12 TABLET | Refills: 11 | Status: SHIPPED | OUTPATIENT
Start: 2020-08-21 | End: 2020-08-26 | Stop reason: SDUPTHER

## 2020-08-21 RX ORDER — METHYLPREDNISOLONE SODIUM SUCCINATE 125 MG/2ML
100 INJECTION, POWDER, LYOPHILIZED, FOR SOLUTION INTRAMUSCULAR; INTRAVENOUS ONCE
Status: CANCELLED | OUTPATIENT
Start: 2020-08-27

## 2020-08-21 RX ORDER — BORTEZOMIB 3.5 MG/1
1.3 INJECTION, POWDER, LYOPHILIZED, FOR SOLUTION INTRAVENOUS; SUBCUTANEOUS ONCE
Status: CANCELLED | OUTPATIENT
Start: 2020-08-27

## 2020-08-21 RX ORDER — FAMOTIDINE 10 MG/ML
20 INJECTION, SOLUTION INTRAVENOUS AS NEEDED
Status: CANCELLED | OUTPATIENT
Start: 2020-08-27

## 2020-08-21 RX ORDER — FAMOTIDINE 10 MG/ML
20 INJECTION, SOLUTION INTRAVENOUS AS NEEDED
Status: CANCELLED | OUTPATIENT
Start: 2020-09-11

## 2020-08-21 RX ORDER — BORTEZOMIB 3.5 MG/1
1.3 INJECTION, POWDER, LYOPHILIZED, FOR SOLUTION INTRAVENOUS; SUBCUTANEOUS ONCE
Status: CANCELLED | OUTPATIENT
Start: 2020-08-31

## 2020-08-21 RX ORDER — ACYCLOVIR 400 MG/1
400 TABLET ORAL 2 TIMES DAILY
Qty: 60 TABLET | Refills: 11 | Status: SHIPPED | OUTPATIENT
Start: 2020-08-21 | End: 2020-08-26 | Stop reason: SDUPTHER

## 2020-08-21 RX ORDER — DIPHENHYDRAMINE HYDROCHLORIDE 50 MG/ML
50 INJECTION INTRAMUSCULAR; INTRAVENOUS AS NEEDED
Status: CANCELLED | OUTPATIENT
Start: 2020-09-04

## 2020-08-21 RX ORDER — ACETAMINOPHEN 500 MG
1000 TABLET ORAL ONCE
Status: CANCELLED | OUTPATIENT
Start: 2020-08-27

## 2020-08-21 RX ORDER — DEXAMETHASONE 4 MG/1
TABLET ORAL
Qty: 60 TABLET | Refills: 2 | Status: SHIPPED | OUTPATIENT
Start: 2020-08-21 | End: 2020-08-26 | Stop reason: SDUPTHER

## 2020-08-21 RX ORDER — ACETAMINOPHEN 500 MG
1000 TABLET ORAL ONCE
Status: CANCELLED | OUTPATIENT
Start: 2020-09-11

## 2020-08-21 RX ORDER — DIPHENHYDRAMINE HYDROCHLORIDE 50 MG/ML
50 INJECTION INTRAMUSCULAR; INTRAVENOUS AS NEEDED
Status: CANCELLED | OUTPATIENT
Start: 2020-09-11

## 2020-08-21 RX ORDER — MEPERIDINE HYDROCHLORIDE 50 MG/ML
25 INJECTION INTRAMUSCULAR; INTRAVENOUS; SUBCUTANEOUS
Status: CANCELLED | OUTPATIENT
Start: 2020-08-27

## 2020-08-21 RX ORDER — DIPHENHYDRAMINE HYDROCHLORIDE 50 MG/ML
50 INJECTION INTRAMUSCULAR; INTRAVENOUS AS NEEDED
Status: CANCELLED | OUTPATIENT
Start: 2020-08-27

## 2020-08-21 RX ORDER — BORTEZOMIB 3.5 MG/1
1.3 INJECTION, POWDER, LYOPHILIZED, FOR SOLUTION INTRAVENOUS; SUBCUTANEOUS ONCE
Status: CANCELLED | OUTPATIENT
Start: 2020-09-04

## 2020-08-21 RX ORDER — METHYLPREDNISOLONE SODIUM SUCCINATE 125 MG/2ML
60 INJECTION, POWDER, LYOPHILIZED, FOR SOLUTION INTRAMUSCULAR; INTRAVENOUS ONCE
Status: CANCELLED | OUTPATIENT
Start: 2020-09-11

## 2020-08-21 RX ORDER — LENALIDOMIDE 10 MG/1
10 CAPSULE ORAL DAILY
Qty: 14 CAPSULE | Refills: 0 | Status: SHIPPED | OUTPATIENT
Start: 2020-08-21 | End: 2020-01-01

## 2020-08-21 RX ORDER — METHYLPREDNISOLONE SODIUM SUCCINATE 125 MG/2ML
100 INJECTION, POWDER, LYOPHILIZED, FOR SOLUTION INTRAMUSCULAR; INTRAVENOUS ONCE
Status: CANCELLED | OUTPATIENT
Start: 2020-09-04

## 2020-08-21 RX ORDER — MEPERIDINE HYDROCHLORIDE 50 MG/ML
25 INJECTION INTRAMUSCULAR; INTRAVENOUS; SUBCUTANEOUS
Status: CANCELLED | OUTPATIENT
Start: 2020-09-04

## 2020-08-21 RX ORDER — ACETAMINOPHEN 500 MG
1000 TABLET ORAL ONCE
Status: CANCELLED | OUTPATIENT
Start: 2020-09-04

## 2020-08-21 RX ORDER — SODIUM CHLORIDE 9 MG/ML
250 INJECTION, SOLUTION INTRAVENOUS ONCE
Status: CANCELLED | OUTPATIENT
Start: 2020-08-27

## 2020-08-21 RX ORDER — MONTELUKAST SODIUM 10 MG/1
10 TABLET ORAL ONCE
Status: CANCELLED | OUTPATIENT
Start: 2020-08-27

## 2020-08-21 RX ORDER — BORTEZOMIB 3.5 MG/1
1.3 INJECTION, POWDER, LYOPHILIZED, FOR SOLUTION INTRAVENOUS; SUBCUTANEOUS ONCE
Status: CANCELLED | OUTPATIENT
Start: 2020-09-08

## 2020-08-21 RX ORDER — SODIUM CHLORIDE 9 MG/ML
250 INJECTION, SOLUTION INTRAVENOUS ONCE
Status: CANCELLED | OUTPATIENT
Start: 2020-09-04

## 2020-08-21 NOTE — PROGRESS NOTES
PROBLEM LIST:     Multiple myeloma not having achieved remission (CMS/HCC)    8/18/2020 Initial Diagnosis     Multiple myeloma not having achieved remission (CMS/HCC)      8/20/2020 Cancer Staged     Staging form: Plasma Cell Myeloma And Plasma Cell Disorders, AJCC 8th Edition  - Clinical stage from 8/20/2020: RISS Stage II (Beta-2-microglobulin (mg/L): 3.5, Albumin (g/dL): 2.7, ISS: Stage II, High-risk cytogenetics: Present, LDH: Normal) - Signed by Merry Pelletier MD on 8/20/2020 8/27/2020 -  Chemotherapy     OP MULTIPLE MYELOMA Daratumumab / Bortezomib / Revlimid/Dexamethasone         REASON FOR VISIT: Newly diagnosed Myeloma    HISTORY OF PRESENT ILLNESS:   65 y.o.  male presents today for follow-up after undergoing a bone marrow biopsy.  I had seen him 1 week ago when he presented with pancytopenia.  A performed a bone marrow biopsy the next day.  Pathology revealed this to be a multiple myeloma.  He has significant issues with renal dysfunction and also anemia and thrombocytopenia.  Otherwise he seems to be doing reasonably well.  Denies any issues with pain.  No recent infections.    Past medical history, social history and family history was reviewed and unchanged from prior visit.    Review of Systems:    Review of Systems - Oncology         Medications:        Current Outpatient Medications:   •  acyclovir (ZOVIRAX) 400 MG tablet, Take 1 tablet by mouth 2 (Two) Times a Day. Take one tablet by mouth twice daily., Disp: 60 tablet, Rfl: 11  •  aspirin 81 MG EC tablet, Take 81 mg by mouth Daily., Disp: , Rfl:   •  atorvastatin (LIPITOR) 80 MG tablet, TAKE 1 TABLET BY MOUTH  EVERY NIGHT, Disp: 90 tablet, Rfl: 3  •  dexamethasone (DECADRON) 4 MG tablet, Take 20 mg, 5 tablets with food on Days 1, 2,  8, 9, 15,16 - Take with Food, Disp: 60 tablet, Rfl: 2  •  fluocinolone (SYNALAR) 0.01 % external solution, Apply  topically to the appropriate area as directed 2 (Two) Times a Day., Disp: 60 mL, Rfl: 11  •   gabapentin (NEURONTIN) 800 MG tablet, Take 800 mg by mouth 3 (Three) Times a Day., Disp: , Rfl:   •  glimepiride (AMARYL) 2 MG tablet, Take 1 tablet by mouth Daily., Disp: 90 tablet, Rfl: 1  •  glucose blood test strip, Use to test blood sugar two times daily., Disp: 100 each, Rfl: 12  •  lisinopril (PRINIVIL,ZESTRIL) 10 MG tablet, Take 1 tablet by mouth Daily., Disp: 90 tablet, Rfl: 3  •  metFORMIN (GLUCOPHAGE) 1000 MG tablet, TAKE 1 TABLET BY MOUTH TWO  TIMES DAILY WITH MEALS, Disp: 180 tablet, Rfl: 3  •  methadone (DOLOPHINE) 10 MG tablet, Take 30 mg by mouth Every 6 (Six) Hours As Needed for Moderate Pain ,, Disp: , Rfl:   •  metoprolol tartrate (LOPRESSOR) 25 MG tablet, TAKE 1 TABLET BY MOUTH TWO  TIMES DAILY, Disp: 180 tablet, Rfl: 3  •  ondansetron (ZOFRAN) 8 MG tablet, Take 1 tablet by mouth 3 (Three) Times a Day As Needed for Nausea or Vomiting., Disp: 30 tablet, Rfl: 5  •  OneTouch Delica Lancets 33G misc, 1 application 3 (Three) Times a Day. Check blood sugar three times daily, E11.9, Disp: 100 each, Rfl: 11  •  pantoprazole (PROTONIX) 40 MG EC tablet, TAKE 1 TABLET BY MOUTH  DAILY, Disp: 90 tablet, Rfl: 3  •  sulfamethoxazole-trimethoprim (BACTRIM DS,SEPTRA DS) 800-160 MG per tablet, Take 1 tablet by mouth 3 (Three) Times a Week. Take 1 tablet on Mondays, Wednesdays and Fridays., Disp: 12 tablet, Rfl: 11  •  tiZANidine (ZANAFLEX) 4 MG tablet, Take 4 mg by mouth 3 (Three) Times a Day As Needed for Muscle Spasms., Disp: , Rfl:   •  warfarin (COUMADIN) 4 MG tablet, Take 1 tablet by mouth Daily. Resume 10/1/18. 4mg daily but Take 7.5 tablet usually on Friday one time dose, Disp: 90 tablet, Rfl: 1    Pain Medications             aspirin 81 MG EC tablet Take 81 mg by mouth Daily.    dexamethasone (DECADRON) 4 MG tablet Take 20 mg, 5 tablets with food on Days 1, 2,  8, 9, 15,16 - Take with Food    gabapentin (NEURONTIN) 800 MG tablet Take 800 mg by mouth 3 (Three) Times a Day.    methadone (DOLOPHINE) 10 MG  "tablet Take 30 mg by mouth Every 6 (Six) Hours As Needed for Moderate Pain ,    tiZANidine (ZANAFLEX) 4 MG tablet Take 4 mg by mouth 3 (Three) Times a Day As Needed for Muscle Spasms.             ALLERGIES:  No Known Allergies      Physical Exam    VITAL SIGNS:  /77   Pulse 66   Temp 96.9 °F (36.1 °C) (Temporal)   Resp 16   Ht 170.2 cm (67\")   Wt 89.4 kg (197 lb)   SpO2 99%   BMI 30.85 kg/m²     Wt Readings from Last 3 Encounters:   08/21/20 89.4 kg (197 lb)   08/12/20 89.8 kg (198 lb)   08/05/20 92.5 kg (204 lb)       Body mass index is 30.85 kg/m². Body surface area is 2.01 meters squared.    Pain Score    08/21/20 0927   PainSc: 0-No pain         Performance Status: 0    General: well appearing, in no acute distress  HEENT: sclera anicteric, neck is supple  Lymphatics: no cervical, supraclavicular, or axillary adenopathy  Cardiovascular: regular rate and rhythm, no murmurs, rubs or gallops  Lungs: clear to auscultation bilaterally  Abdomen: soft, nontender, nondistended.  No palpable organomegaly  Extremities: no lower extremity edema  Skin: no rashes, lesions, bruising, or petechiae  Msk:  Shows no weakness of the large muscle groups  Psych: Mood is stable    Lab Results   Component Value Date    FINALDX  08/13/2020      PERIPHERAL BLOOD SMEAR, BONE MARROW ASPIRATE, CLOT SECTION AND BIOPSY WITH FLOW CYTOMETRY, IMMUNOHISTOCHEMISTRY AND IN-SITU HYBRIDIZATION STUDIES:  Pancytopenia.  Involved by CD56 positive lambda clonal plasma cell neoplasm accounting for approximately 50% of marrow cellularity.  Mild reticulin fibrosis identified (MF-1).  No amyloid deposition identified on congo red stain with appropriate control.      PCC/dlb       ]      RECENT LABS:    Lab Results   Component Value Date    HGB 7.4 (L) 08/13/2020    HCT 23.9 (L) 08/13/2020    MCV 98.0 (H) 08/13/2020    PLT 49 (C) 08/13/2020    WBC 2.80 (L) 08/13/2020    NEUTROABS 1.62 (L) 08/13/2020    LYMPHSABS 1.10 08/12/2020    MONOSABS 0.30 " 08/12/2020    EOSABS 0.03 08/13/2020    BASOSABS 0.00 08/13/2020       Lab Results   Component Value Date    GLUCOSE 77 08/12/2020    BUN 30 (H) 08/12/2020    CREATININE 2.14 (H) 08/12/2020     08/12/2020    K 5.2 08/12/2020    CL 97 (L) 08/12/2020    CO2 25.0 08/12/2020    CALCIUM 9.3 08/12/2020    PROTEINTOT 10.8 (H) 08/12/2020    ALBUMIN 2.70 (L) 08/12/2020    BILITOT 0.6 08/12/2020    ALKPHOS 36 (L) 08/12/2020    AST 37 08/12/2020    ALT 23 08/12/2020               Assessment/Plan    1.  Multiple myeloma with renal dysfunction and pancytopenia.  His cytogenetics revealed that he has high risk cytogenetics.  I am going to follow the [facility] recommendation and start him on induction treatment with a 4 drug regimen.  We will treat him per the [facility] trial which uses daratumumab, Velcade, Revlimid and dexamethasone.  My plan is to treat him with 4 cycles of treatment prior to autologous stem cell transplantation.  Patient is a transplant eligible candidate.  I will have him see Dr. Mendoza after 2 cycles in anticipation of stem cell collection.  The data suggested a 95.8% 24-month progression free survival with addition of daratumumab to RVD.  The primary endpoint of stringent complete response rate at post transplant revealed almost 10% more complete response compared to RVD after transplant.  So I think this is a good regimen if he is able to tolerate it.  I discussed with him the expectations of the disease and plan going forward.  We discussed the side effects to be expected.          I spent a total of 40 minutes in direct patient care, greater than 30 minutes (greater than 50%) were spent in coordination of care, and counseling the patient regarding  Myeloma . Answered any questions patient had with medication and plan.      Merry Pelletier MD  [facility]ematology [facility] On[facility]    Return on: 09/18/20  Return in (Approximately): Schedule with next infusion, 1 month    Orders Placed This  Encounter   Procedures   • Comprehensive metabolic panel   • Provider Communication   • Provider Communication   • Provider Communication   • Nursing Communication   • Provider Communication   • Daratumumab Type & Screen   • CBC and Differential   • CBC and Differential   • CBC and Differential       8/21/2020

## 2020-08-21 NOTE — PROGRESS NOTES
Oral Chemotherapy Teaching      Patient Name/:  Gene Bond   1955  Oral Chemotherapy Regimen:  Lenalidomide 10mg PO once daily x 14 days, followed by 7 days off (reduced starting dose d/t renal dysfunction) + Daratumumab on Days 1,8, and 15 + Velcade SQ on Days 1,4,8, and 11 every 21 days + Dexamethasone - Plan for 4 cycles per MD  Date Started Medication: Cycle 1 anticipate       Additional Notes:  Oral chemotherapy clinic received referral from Dr. Caballero regarding the initiation of Lenalidomide + Yoli/Velcade/Dex. Reviewed patient chart. Released script for Revlimid to [facility] to begin processing. Pt scheduled for APRN chemoteach in [place] with APRN on 20. Will plan to reach out to patient via telephone to provide additional information regarding oral Revlimid prior to 2020 appt.

## 2020-08-21 NOTE — PROGRESS NOTES
Drug: lenalidomide  Strength: 10mg capsule  Directions: Take 1 capsule PO daily Days 1-14 then 7 days off  QTY: 14  RF:0    Released to pharmacy: [facility]    Completed independent double check on medication order/RX.

## 2020-08-24 ENCOUNTER — SPECIALTY PHARMACY (OUTPATIENT)
Dept: ONCOLOGY | Facility: HOSPITAL | Age: 65
End: 2020-08-24

## 2020-08-24 LAB
ALBUMIN SERPL-MCNC: 3.5 G/DL (ref 2.9–4.4)
ALBUMIN/GLOB SERPL: 0.6 {RATIO} (ref 0.7–1.7)
ALPHA1 GLOB FLD ELPH-MCNC: 0.2 G/DL (ref 0–0.4)
ALPHA2 GLOB SERPL ELPH-MCNC: 0.5 G/DL (ref 0.4–1)
B-GLOBULIN SERPL ELPH-MCNC: 6.1 G/DL (ref 0.7–1.3)
GAMMA GLOB SERPL ELPH-MCNC: 0.3 G/DL (ref 0.4–1.8)
GLOBULIN SER CALC-MCNC: 7 G/DL (ref 2.2–3.9)
IGA SERPL-MCNC: 6011 MG/DL (ref 61–437)
IGG SERPL-MCNC: 243 MG/DL (ref 603–1613)
IGM SERPL-MCNC: 6 MG/DL (ref 20–172)
INTERPRETATION SERPL IEP-IMP: ABNORMAL
KAPPA LC SERPL-MCNC: 7.9 MG/L (ref 3.3–19.4)
KAPPA LC/LAMBDA SER: 0.01 {RATIO} (ref 0.26–1.65)
LAMBDA LC FREE SERPL-MCNC: 1173.7 MG/L (ref 5.7–26.3)
Lab: ABNORMAL
M-SPIKE: 6 G/DL
PROT SERPL-MCNC: 10.5 G/DL (ref 6–8.5)

## 2020-08-24 NOTE — PROGRESS NOTES
Called to provide additional information regarding the oral component of his treatment regimen (Lenalidomide) which will be used in combination with Daratumumab/Velcade/Dex per the [facility] trial. Pt has appt with APRN on Tuesday for full chemoteach and education. Medication (Lenalidomide) processing at [facility].

## 2020-08-24 NOTE — PROGRESS NOTES
Oral Chemotherapy Teaching      Patient Name/:  Gene Bond   1955  Oral Chemotherapy Regimen:  Lenalidomide 10mg PO QD on days 1-14 of 21 day cycle  Date Started Medication: pending medication acquisition    Initial Teaching Follow Up Comments     Safety     Storage instructions (away from children; away from heat/cold, sunlight, or moisture), handling - use of gloves (caregivers), washing hands after touching pills, managing waste     “How are you storing your medications?”, reminders on storage, proper handling (caregivers using gloves, washing hands, away from children, managing waste, etc.), disposal of medication with D/C or dosage change      Store medication safe away from children and pets, at room temp away from light. If you use a pill box, use a separate pill box from other medication. Counseled patient on safe handling of soiled linen and proper flush technique.  Discussed if the patient is handling their own medications, then they need to wash their hands properly after touching the medication.  If a caregiver is assisting with handling medication, need to wear disposal gloves and wash hands properly afterwards. Patient instructed to practice safe sex with barrier protection to prevent pregnancy during therapy and a minimum of two weeks after therapy discontinuation.     Adherence      patient and/or caregiver on how to take medication, take with/without food, assess their adherence potential, stress importance of adherence, ways to manage adherence (pill boxes, phone reminders, calendars), what to do if miss a dose   “How are you taking your medication?” “How are you remembering to take your medication?”, “How many doses have you missed?”, determine reasons for non-adherence (not remembering, side effects, etc), ways to improve, overadherence? Remind patient of ways to improve/maintain adherence     Provided calendar to help improve adherence. Take medication once a day either with or  without food.  Discussed patient can take within 12 hours if a dose is forgotten, but don’t double up on doses. Provided patient with written materials and packet on medication.     Side Effects/Adverse Reactions      patient on potential side effects, s/s, ways to manage, when to call MD/seek help     Determine if patient experiencing side effects, ways to manage    Counseled on home management and when to call MD on ADRs.  Discussed most common ADRs including decreased RBC/WBC/platelets, diarrhea, constipation, fatigue, itchy skin, pain, nausea and vomiting. Discussed serious ADRs of potential harm to liver, TLS, blood clots and birth defects.       Miscellaneous     Food interactions, DDIs, financial issues   Determine if patient started any new medications since being placed on oral chemo (analyze for DDI)   No DDI identified to discuss with patient.  Advised patient on the process of obtaining the medication from a specialty pharmacy. Discussed REMS program and precautions needed to take.        Additional Notes:  Discussed aforementioned material with patient over the phone. All questions and concerns addressed. Provided patient with Dr. Anne Gillespie's contact information and instructions to call should additional questions arise. Provided patient with personalized treatment calendar and written educational material.

## 2020-08-25 ENCOUNTER — TELEPHONE (OUTPATIENT)
Dept: FAMILY MEDICINE CLINIC | Facility: CLINIC | Age: 65
End: 2020-08-25

## 2020-08-25 ENCOUNTER — OFFICE VISIT (OUTPATIENT)
Dept: ONCOLOGY | Facility: CLINIC | Age: 65
End: 2020-08-25

## 2020-08-25 VITALS
OXYGEN SATURATION: 98 % | WEIGHT: 195 LBS | DIASTOLIC BLOOD PRESSURE: 84 MMHG | BODY MASS INDEX: 30.61 KG/M2 | TEMPERATURE: 98 F | HEART RATE: 63 BPM | SYSTOLIC BLOOD PRESSURE: 147 MMHG | HEIGHT: 67 IN | RESPIRATION RATE: 16 BRPM

## 2020-08-25 DIAGNOSIS — C90.00 MULTIPLE MYELOMA NOT HAVING ACHIEVED REMISSION (HCC): Primary | ICD-10-CM

## 2020-08-25 DIAGNOSIS — E11.65 UNCONTROLLED TYPE 2 DIABETES MELLITUS WITH HYPERGLYCEMIA (HCC): ICD-10-CM

## 2020-08-25 PROCEDURE — 99215 OFFICE O/P EST HI 40 MIN: CPT | Performed by: NURSE PRACTITIONER

## 2020-08-25 NOTE — TELEPHONE ENCOUNTER
Provider:DR GARCIA  Caller: LUIS DOSS  Relationship to Patient:SPOUSE  Pharmacy:[facility]  Phone Number:[phone]  Reason for Call:PT WOULD LIKE A CB TO EXPLAIN AND UPDATE THE PROVIDER ABOUT THE PT MEDICAL CONDITIONS AND POSSIBLE INSULIN PLEASE ADVISE  When was the patient last seen:   When did it start:  Where is it located:  Characteristics of symptom/severity:   Timing- Is it constant or intermittent:  What makes it worse:  What makes it better:  What therapies/medications have you tried:

## 2020-08-25 NOTE — TELEPHONE ENCOUNTER
----- Message from Gene R. Varun sent at 8/25/2020  2:33 PM EDT -----  Regarding: Prescription Question  Contact: [phone]  Marina Jimenez, at the office of Oncology, [place].  She told me to let Dr Bolden that Parviz begins CHEMO THURSDAY AUG. 27. He will be on oral steroids as well as IV Steroids. 2 times a week for 15 weeks.  He is diabetic and Marina says he may need insulin to control his sugar.   This needs to be called in at least a weeks supply to [facility], if he needs it. Then called via [facility], thereafter.   He can decide if Gene needs this, but I needed to make you aware of the situation.  He has MULTIPLE MYELOMA as you know.  Thank you for your prompt attention to this in advance

## 2020-08-25 NOTE — PROGRESS NOTES
CHEMOTHERAPY PREPARATION    Gene Bond  [id number]  1955    Chief Complaint:  Multiple myeloma    History of present illness:  Gene Bond is a 65 y.o. year old male who is here today for chemotherapy preparation and needs assessment. The patient has been diagnosed with multiple myeloma and is scheduled to begin treatment with daratumumab, Velcade, Revlimid and dexamethasone.     Oncology History:       Multiple myeloma not having achieved remission (CMS/HCC)    8/18/2020 Initial Diagnosis     Multiple myeloma not having achieved remission (CMS/HCC)      8/20/2020 Cancer Staged     Staging form: Plasma Cell Myeloma And Plasma Cell Disorders, AJCC 8th Edition  - Clinical stage from 8/20/2020: RISS Stage III (Beta-2-microglobulin (mg/L): 16, Albumin (g/dL): 2.7, ISS: Stage III, High-risk cytogenetics: Present, LDH: Normal) - Signed by Merry Pelletier MD on 8/24/2020 8/27/2020 -  Chemotherapy     OP MULTIPLE MYELOMA Daratumumab / Bortezomib / Revlimid/Dexamethasone         Past Medical History:   Diagnosis Date   • Arthritis    • Blood clot in vein     Right leg and heart   • Blood clotting disorder (CMS/HCC)    • CHF (congestive heart failure) (CMS/HCC)    • COPD (chronic obstructive pulmonary disease) (CMS/HCC)    • Coronary artery disease    • Diabetes mellitus (CMS/HCC)     check sugar once oer day   • Dizzy    • DVT (deep venous thrombosis) (CMS/HCC)    • Full dentures    • GERD (gastroesophageal reflux disease)    • Heart attack (CMS/HCC)    • Hx MRSA infection     right arm- 20 years ago- txed    • Hyperlipidemia    • Hypertension    • Migraine    • Neck pain     into both arms   • Numbness and tingling in both hands     from neck issues    • CAMERON treated with BiPAP     setting exhale 10-20 inhale auto 25    • Wears glasses        Past Surgical History:   Procedure Laterality Date   • ANKLE OPEN REDUCTION INTERNAL FIXATION     • ANTERIOR CERVICAL DISCECTOMY W/ FUSION N/A 9/25/2018     Procedure: CERVICAL DISCECTOMY ANTERIOR WITH FUSION C4-5 WITH CAGE AND ANTERIOR PLATING;  Surgeon: Aleksey Suarez MD;  Location:  [facility] OR;  Service: Orthopedic Spine   • APPENDECTOMY     • COLONOSCOPY  ~2014   • CORONARY ANGIOPLASTY WITH STENT PLACEMENT      x1   • ENDOSCOPY     • INTERVENTIONAL RADIOLOGY PROCEDURE N/A 12/27/2019    Procedure: myelogram lumbar spine;  Surgeon: Ajit Barnes MD;  Location:  [facility] CATH INVASIVE LOCATION;  Service: Interventional Radiology   • JOINT REPLACEMENT      bilat knee    • LUMBAR FUSION  03/26/2019    L3-5 fusion Dr. Canchola   • NECK SURGERY         MEDICATIONS: The current medication list was reviewed and reconciled.     Allergies:  has No Known Allergies.    Family History   Problem Relation Age of Onset   • Osteoarthritis Mother    • Diabetes Mother    • Osteoarthritis Father          Review of Systems   Constitutional: Negative for activity change, appetite change, fatigue and fever.   HENT: Negative for congestion, dental problem, hearing loss, mouth sores, rhinorrhea, sore throat, tinnitus and voice change.    Eyes: Negative for photophobia, discharge, itching and visual disturbance.   Respiratory: Negative for apnea, cough, chest tightness, shortness of breath and wheezing.    Cardiovascular: Negative for chest pain and leg swelling.   Gastrointestinal: Negative for abdominal distention, abdominal pain, blood in stool, constipation, diarrhea and vomiting.   Endocrine: Negative for cold intolerance and heat intolerance.   Genitourinary: Negative for difficulty urinating, dysuria, frequency and urgency.   Musculoskeletal: Negative for arthralgias, back pain and myalgias.   Skin: Negative for color change and pallor.   Allergic/Immunologic: Negative for environmental allergies and food allergies.   Neurological: Negative for dizziness, seizures, facial asymmetry, weakness and numbness.   Psychiatric/Behavioral: Negative for agitation and hallucinations. The  "patient is not hyperactive.        Physical Exam  Vital Signs: /84   Pulse 63   Temp 98 °F (36.7 °C) (Temporal)   Resp 16   Ht 170.2 cm (67\")   Wt 88.5 kg (195 lb)   SpO2 98%   BMI 30.54 kg/m²    General Appearance:  alert, cooperative, no apparent distress and appears stated age   Neurologic/Psychiatric: A&O x 3, gait steady, appropriate affect   HEENT:  Normocephalic, without obvious abnormality, mucous membranes moist   Lungs:   Clear to auscultation bilaterally; respirations regular, even, and unlabored bilaterally   Heart:  Regular rate and rhythm, no murmurs appreciated   Extremities: Normal, atraumatic; no clubbing, cyanosis, or edema    Skin: No rashes, lesions, or abnormal coloration noted     ECOG Performance Status: (1) Restricted in Physically Strenuous Activity, Ambulatory & Able to Do Work of Light Nature          NEEDS ASSESSMENTS    Genetics  The patient's new diagnosis and family history have been reviewed for genetic counseling needs. A genetic referral is not recommended.     Psychosocial  The patient has completed a PHQ-9 Depression Screening and the Distress Thermometer (DT) today.   PHQ-9 results show 0 (No Depression). The patient scored their distress today as 0 on a scale of 0-10 with 0 being no distress and 10 being extreme distress.   Problems marked by the patient as being an issue for them within the last week include no problems.   Results were reviewed along with psychosocial resources offered by our cancer center. Our oncology social worker will be flagged for a DT score of 4 or above, and a same day call will be made for a score of 9 or 10. A mental health referral is not recommended at this time. The patient is not accepting of a referral to [facility].   Copies of patient's questionnaires will be scanned into EMR for details and further reference.    Barriers to care  A barriers form was also completed by the patient today. We discussed services offered by our " "facility to help him have adequate access to care. The patient was given the name and card for our Oncology Social Worker, Sharda Sexton. Based upon barriers assessment today, the patient will not require a follow-up call from the  to further discuss needs.   A copy of the barriers form will also be scanned into EMR for details and further reference.     VAD Assessment  The patient and I discussed planned intervenous chemotherapy as well as other IV treatments that are often needed throughout the course of treatment. These may include, but are not limited to blood transfusions, antibiotics, and IV hydration. The vasculature does appear to be adequate for multiple peripheral IVs throughout their treatment course. Discussed risks and benefits of VADs. The patient would not like to pursue Port-A-Cath insertion prior to initiation of treatment.     Advanced Care Planning  The patient and I discussed advanced care planning, \"Conversations that Matter\".   This service was offered, free of charge, for development of advance directives with a certified ACP facilitator.  The patient does not have an up-to-date advanced directive. This document is not on file with our office. The patient is not interested in an appointment with one of our facilitators to create or update their advanced directives.      Palliative Care  The patient and I discussed palliative care services. Palliative care is not the same as Hospice care. This is specialized medical care for people living with serious illness with the goal of improving quality of life for the patient and their family. Ana Luisa has partnered with [facility] to offer our patients outpatient palliative care early along with their treatment to assist in coordination of care, symptom management, pain management, and medical decision making.  Oncology criteria for palliative care referral is not met at this time. The patient is not interested in a " palliative care consultation.     Additional Referral needs  Nutrition      CHEMOTHERAPY EDUCATION    Booklets Given: Chemotherapy and You [x]  Eating Hints [x]    Sexuality/Fertility Books []      Chemotherapy/Biotherapy Education Sheets: (list all that apply)  nausea management, acid reflux management, diarrhea management, Cancer resourse contacts information, skin and mouth care and vaccination information                                                                                                                                                                 Chemotherapy Regimen:   Treatment Plans     Name Type Plan dates Plan Provider         Active    OP MULTIPLE MYELOMA Daratumumab / Bortezomib / Revlimid/Dexamethasone ONCOLOGY TREATMENT  8/21/2020 - Present Merry Pelletier MD                    TOPICS EDUCATION PROVIDED COMMENTS   ANEMIA:  role of RBC, cause, s/s, ways to manage, role of transfusion [x]    THROMBOCYTOPENIA:  role of platelet, cause, s/s, ways to prevent bleeding, things to avoid, when to seek help [x]    NEUTROPENIA:  role of WBC, cause, infection precautions, s/s of infection, when to call MD [x]    NUTRITION & APPETITE CHANGES:  importance of maintaining healthy diet & weight, ways to manage to improve intake, dietary consult, exercise regimen [x]    DIARRHEA:  causes, s/s of dehydration, ways to manage, dietary changes, when to call MD [x]    CONSTIPATION:  causes, ways to manage, dietary changes, when to call MD [x]    NAUSEA & VOMITING:  cause, use of antiemetics, dietary changes, when to call MD [x]    MOUTH SORES:  causes, oral care, ways to manage [x]    ALOPECIA:  cause, ways to manage, resources [x]    INFERTILITY & SEXUALITY:  causes, fertility preservation options, sexuality changes, ways to manage, importance of birth control [x]    NERVOUS SYSTEM CHANGES:  causes, s/s, neuropathies, cognitive changes, ways to manage [x]    PAIN:  causes, ways to manage [x] ????   SKIN  & NAIL CHANGES:  cause, s/s, ways to manage [x]    ORGAN TOXICITIES:  cause, s/s, need for diagnostic tests, labs, when to notify MD [x]    SURVIVORSHIP:  distress, distress assessment, secondary malignancies, early/late effects, follow-up, social issues, social support [x]    HOME CARE:  use of spill kits, storing of PO chemo, how to manage bodily fluids [x]    MISCELLANEOUS:  drug interactions, administration, vesicant, et [x]        Assessment and Plan:    Diagnoses and all orders for this visit:    Multiple myeloma not having achieved remission (CMS/[facility])    Uncontrolled type 2 diabetes mellitus with hyperglycemia (CMS/[facility])        This was a 60 minute face-to-face visit with 50 minutes spent in  counseling and coordination of care as documented above.   The patient and I have reviewed their new cancer diagnosis and scheduled treatment plan. Needs assessment was completed including genetics, psychosocial needs, barriers to care, VAD evaluation, advanced care planning, and palliative care services. Referrals have been ordered as appropriate based upon our evaluation and patient desires.     Chemotherapy teaching was also completed today as documented above. Adequate time was given to answer all questions to his satisfaction. Patient and family are aware of their care team members and contact information if they have questions or problems throughout the treatment course. Needs assessments and education has been completed. The patient is adequately prepared to begin treatment as scheduled.     We will proceed with Cycle #1 on Thursday. I discussed with the patient how to take decadron. I advised patient to call PCP for blood sugar monitoring as well as possible insulin. She will also start on acyclovir daily and Bactrim three times a day. This has been sent to mail in pharmacy. I advised the patient if medicine is not delivered by tomorrow we may need to do a 30 day dose at a local pharmacy. Patient and wife will  notify clinic if this occurs.     Revlimid teaching completed by oral pharmacy clinic and reinforced today. Medication has not been delivered. I advised patient if they do not receive medication by tomorrow to contact clinic.       Marina Jimenez, APRN

## 2020-08-26 ENCOUNTER — TELEPHONE (OUTPATIENT)
Dept: FAMILY MEDICINE CLINIC | Facility: CLINIC | Age: 65
End: 2020-08-26

## 2020-08-26 ENCOUNTER — TELEPHONE (OUTPATIENT)
Dept: ONCOLOGY | Facility: CLINIC | Age: 65
End: 2020-08-26

## 2020-08-26 DIAGNOSIS — C90.00 MULTIPLE MYELOMA NOT HAVING ACHIEVED REMISSION (HCC): ICD-10-CM

## 2020-08-26 RX ORDER — ACYCLOVIR 400 MG/1
400 TABLET ORAL 2 TIMES DAILY
Qty: 60 TABLET | Refills: 11 | Status: SHIPPED | OUTPATIENT
Start: 2020-08-26 | End: 2020-08-26 | Stop reason: SDUPTHER

## 2020-08-26 RX ORDER — INSULIN GLARGINE 100 [IU]/ML
20 INJECTION, SOLUTION SUBCUTANEOUS DAILY
Qty: 3 PEN | Refills: 0 | Status: SHIPPED | OUTPATIENT
Start: 2020-08-26 | End: 2020-09-04 | Stop reason: SDUPTHER

## 2020-08-26 RX ORDER — SULFAMETHOXAZOLE AND TRIMETHOPRIM 800; 160 MG/1; MG/1
1 TABLET ORAL 3 TIMES WEEKLY
Qty: 12 TABLET | Refills: 11 | Status: SHIPPED | OUTPATIENT
Start: 2020-08-26 | End: 2020-08-26 | Stop reason: SDUPTHER

## 2020-08-26 RX ORDER — DEXAMETHASONE 4 MG/1
TABLET ORAL
Qty: 60 TABLET | Refills: 6 | Status: SHIPPED | OUTPATIENT
Start: 2020-08-26 | End: 2020-01-01

## 2020-08-26 RX ORDER — DEXAMETHASONE 4 MG/1
TABLET ORAL
Qty: 60 TABLET | Refills: 6 | Status: SHIPPED | OUTPATIENT
Start: 2020-08-26 | End: 2020-08-26 | Stop reason: SDUPTHER

## 2020-08-26 RX ORDER — ONDANSETRON HYDROCHLORIDE 8 MG/1
8 TABLET, FILM COATED ORAL 3 TIMES DAILY PRN
Qty: 30 TABLET | Refills: 6 | Status: SHIPPED | OUTPATIENT
Start: 2020-08-26 | End: 2020-08-26 | Stop reason: SDUPTHER

## 2020-08-26 RX ORDER — ACYCLOVIR 400 MG/1
400 TABLET ORAL 2 TIMES DAILY
Qty: 60 TABLET | Refills: 11 | Status: SHIPPED | OUTPATIENT
Start: 2020-08-26 | End: 2020-01-01

## 2020-08-26 RX ORDER — ONDANSETRON HYDROCHLORIDE 8 MG/1
8 TABLET, FILM COATED ORAL 3 TIMES DAILY PRN
Qty: 30 TABLET | Refills: 6 | Status: SHIPPED | OUTPATIENT
Start: 2020-08-26 | End: 2020-01-01

## 2020-08-26 RX ORDER — SULFAMETHOXAZOLE AND TRIMETHOPRIM 800; 160 MG/1; MG/1
1 TABLET ORAL 3 TIMES WEEKLY
Qty: 12 TABLET | Refills: 11 | Status: SHIPPED | OUTPATIENT
Start: 2020-08-26 | End: 2020-01-01

## 2020-08-26 NOTE — TELEPHONE ENCOUNTER
Called and LM for wife that basaglar is being called in for 20 units a day but not to use until he needs it or until his sugars start to increase.  Sent for 3 pens and pen needles  to [facility]

## 2020-08-26 NOTE — TELEPHONE ENCOUNTER
----- Message from Korey Mckinney sent at 8/26/2020 11:05 AM EDT -----  Regarding: RX   Contact: [phone]  Pts wife called and said that they have not received his steroids/ Zofran or any of the med's you told them that he needs to take.   And she said that you told him to call. Thanks   Burke

## 2020-08-26 NOTE — TELEPHONE ENCOUNTER
Patients wife states Dr. Caballero is not signed up for [facility] escribing.  A message was sent to Maria L Perez RN to call [facility] MD line [phone]. Prescriptions sent to [facility] so patient can  and get started.

## 2020-08-26 NOTE — TELEPHONE ENCOUNTER
Insulin and pen needles have been called in, Left VM and wife has called and explained again that everything has been called into the pharmacy

## 2020-08-26 NOTE — TELEPHONE ENCOUNTER
PT'S WIFE CALLED STATED THAT THE PT MIGHT NEED INSULIN DUE TO THE PT BEGINNING ORAL AND IV STEROIDS TOMORROW.  LUIS STATED THAT THEY SUGGESTED HE HAS INSULIN READY AT THE PHARMACY.     LUIS'S CONTACT [phone]     PHARMACY VERIFIED [facility] [address] [place] [address] [phone] [facility] [phone] FX       PLEASE ADVISE

## 2020-08-27 ENCOUNTER — NURSE NAVIGATOR (OUTPATIENT)
Dept: ONCOLOGY | Facility: HOSPITAL | Age: 65
End: 2020-08-27

## 2020-08-27 ENCOUNTER — INFUSION (OUTPATIENT)
Dept: ONCOLOGY | Facility: HOSPITAL | Age: 65
End: 2020-08-27

## 2020-08-27 VITALS
HEART RATE: 85 BPM | TEMPERATURE: 97.6 F | RESPIRATION RATE: 16 BRPM | WEIGHT: 195 LBS | DIASTOLIC BLOOD PRESSURE: 73 MMHG | BODY MASS INDEX: 30.54 KG/M2 | SYSTOLIC BLOOD PRESSURE: 122 MMHG

## 2020-08-27 DIAGNOSIS — C90.00 MULTIPLE MYELOMA NOT HAVING ACHIEVED REMISSION (HCC): Primary | ICD-10-CM

## 2020-08-27 LAB
ALBUMIN SERPL-MCNC: 2.8 G/DL (ref 3.5–5.2)
ALBUMIN/GLOB SERPL: 0.4 G/DL
ALP SERPL-CCNC: 34 U/L (ref 39–117)
ALT SERPL W P-5'-P-CCNC: 24 U/L (ref 1–41)
ANION GAP SERPL CALCULATED.3IONS-SCNC: 19.2 MMOL/L (ref 5–15)
AST SERPL-CCNC: 28 U/L (ref 1–40)
BASOPHILS # BLD AUTO: 0.01 10*3/MM3 (ref 0–0.2)
BASOPHILS NFR BLD AUTO: 0.3 % (ref 0–1.5)
BILIRUB SERPL-MCNC: 0.6 MG/DL (ref 0–1.2)
BUN SERPL-MCNC: 43 MG/DL (ref 8–23)
BUN/CREAT SERPL: 15.5 (ref 7–25)
CALCIUM SPEC-SCNC: 9.3 MG/DL (ref 8.6–10.5)
CHLORIDE SERPL-SCNC: 95 MMOL/L (ref 98–107)
CO2 SERPL-SCNC: 24.8 MMOL/L (ref 22–29)
CREAT SERPL-MCNC: 2.77 MG/DL (ref 0.76–1.27)
DEPRECATED RDW RBC AUTO: 60.4 FL (ref 37–54)
EOSINOPHIL # BLD AUTO: 0.02 10*3/MM3 (ref 0–0.4)
EOSINOPHIL NFR BLD AUTO: 0.6 % (ref 0.3–6.2)
ERYTHROCYTE [DISTWIDTH] IN BLOOD BY AUTOMATED COUNT: 17.1 % (ref 12.3–15.4)
GFR SERPL CREATININE-BSD FRML MDRD: 23 ML/MIN/1.73
GLOBULIN UR ELPH-MCNC: 8 GM/DL
GLUCOSE SERPL-MCNC: 104 MG/DL (ref 65–99)
HCT VFR BLD AUTO: 22.8 % (ref 37.5–51)
HGB BLD-MCNC: 7.2 G/DL (ref 13–17.7)
IMM GRANULOCYTES # BLD AUTO: 0.02 10*3/MM3 (ref 0–0.05)
IMM GRANULOCYTES NFR BLD AUTO: 0.6 % (ref 0–0.5)
LYMPHOCYTES # BLD AUTO: 0.98 10*3/MM3 (ref 0.7–3.1)
LYMPHOCYTES NFR BLD AUTO: 29.5 % (ref 19.6–45.3)
MCH RBC QN AUTO: 30.6 PG (ref 26.6–33)
MCHC RBC AUTO-ENTMCNC: 31.6 G/DL (ref 31.5–35.7)
MCV RBC AUTO: 97 FL (ref 79–97)
MONOCYTES # BLD AUTO: 0.5 10*3/MM3 (ref 0.1–0.9)
MONOCYTES NFR BLD AUTO: 15.1 % (ref 5–12)
NEUTROPHILS NFR BLD AUTO: 1.79 10*3/MM3 (ref 1.7–7)
NEUTROPHILS NFR BLD AUTO: 53.9 % (ref 42.7–76)
NRBC BLD AUTO-RTO: 0 /100 WBC (ref 0–0.2)
PLATELET # BLD AUTO: 53 10*3/MM3 (ref 140–450)
PMV BLD AUTO: 12 FL (ref 6–12)
POTASSIUM SERPL-SCNC: 4.9 MMOL/L (ref 3.5–5.2)
PROT SERPL-MCNC: 10.8 G/DL (ref 6–8.5)
RBC # BLD AUTO: 2.35 10*6/MM3 (ref 4.14–5.8)
SODIUM SERPL-SCNC: 139 MMOL/L (ref 136–145)
WBC # BLD AUTO: 3.32 10*3/MM3 (ref 3.4–10.8)

## 2020-08-27 PROCEDURE — 25010000002 METHYLPREDNISOLONE PER 125 MG: Performed by: INTERNAL MEDICINE

## 2020-08-27 PROCEDURE — 25010000002 DIPHENHYDRAMINE PER 50 MG: Performed by: INTERNAL MEDICINE

## 2020-08-27 PROCEDURE — 96415 CHEMO IV INFUSION ADDL HR: CPT

## 2020-08-27 PROCEDURE — 25010000002 DARATUMUMAB 100 MG/5ML SOLUTION 20 ML VIAL: Performed by: INTERNAL MEDICINE

## 2020-08-27 PROCEDURE — 25010000002 DARATUMUMAB 100 MG/5ML SOLUTION 5 ML VIAL: Performed by: INTERNAL MEDICINE

## 2020-08-27 PROCEDURE — 96413 CHEMO IV INFUSION 1 HR: CPT

## 2020-08-27 PROCEDURE — 36415 COLL VENOUS BLD VENIPUNCTURE: CPT

## 2020-08-27 PROCEDURE — 96401 CHEMO ANTI-NEOPL SQ/IM: CPT

## 2020-08-27 PROCEDURE — 80053 COMPREHEN METABOLIC PANEL: CPT

## 2020-08-27 PROCEDURE — 85025 COMPLETE CBC W/AUTO DIFF WBC: CPT

## 2020-08-27 PROCEDURE — 96375 TX/PRO/DX INJ NEW DRUG ADDON: CPT

## 2020-08-27 PROCEDURE — 96376 TX/PRO/DX INJ SAME DRUG ADON: CPT

## 2020-08-27 PROCEDURE — 25010000002 BORTEZOMIB PER 0.1 MG: Performed by: INTERNAL MEDICINE

## 2020-08-27 PROCEDURE — 96367 TX/PROPH/DG ADDL SEQ IV INF: CPT

## 2020-08-27 RX ORDER — METHYLPREDNISOLONE SODIUM SUCCINATE 125 MG/2ML
100 INJECTION, POWDER, LYOPHILIZED, FOR SOLUTION INTRAMUSCULAR; INTRAVENOUS ONCE
Status: COMPLETED | OUTPATIENT
Start: 2020-08-27 | End: 2020-08-27

## 2020-08-27 RX ORDER — MONTELUKAST SODIUM 10 MG/1
10 TABLET ORAL ONCE
Status: COMPLETED | OUTPATIENT
Start: 2020-08-27 | End: 2020-08-27

## 2020-08-27 RX ORDER — LENALIDOMIDE 10 MG/1
10 CAPSULE ORAL DAILY
Qty: 14 CAPSULE | Refills: 0 | Status: SHIPPED | OUTPATIENT
Start: 2020-08-27 | End: 2020-09-08 | Stop reason: SDUPTHER

## 2020-08-27 RX ORDER — BORTEZOMIB 3.5 MG/1
1.3 INJECTION, POWDER, LYOPHILIZED, FOR SOLUTION INTRAVENOUS; SUBCUTANEOUS ONCE
Status: COMPLETED | OUTPATIENT
Start: 2020-08-27 | End: 2020-08-27

## 2020-08-27 RX ORDER — SODIUM CHLORIDE 9 MG/ML
250 INJECTION, SOLUTION INTRAVENOUS ONCE
Status: DISCONTINUED | OUTPATIENT
Start: 2020-08-27 | End: 2020-08-27 | Stop reason: HOSPADM

## 2020-08-27 RX ORDER — ACETAMINOPHEN 500 MG
1000 TABLET ORAL ONCE
Status: COMPLETED | OUTPATIENT
Start: 2020-08-27 | End: 2020-08-27

## 2020-08-27 RX ADMIN — DARATUMUMAB 1430 MG: 100 INJECTION, SOLUTION, CONCENTRATE INTRAVENOUS at 10:14

## 2020-08-27 RX ADMIN — DIPHENHYDRAMINE HYDROCHLORIDE 50 MG: 50 INJECTION INTRAMUSCULAR; INTRAVENOUS at 09:08

## 2020-08-27 RX ADMIN — BORTEZOMIB 2.6 MG: 3.5 INJECTION, POWDER, LYOPHILIZED, FOR SOLUTION INTRAVENOUS; SUBCUTANEOUS at 10:10

## 2020-08-27 RX ADMIN — ACETAMINOPHEN 1000 MG: 500 TABLET, FILM COATED ORAL at 09:13

## 2020-08-27 RX ADMIN — MONTELUKAST SODIUM 10 MG: 10 TABLET, FILM COATED ORAL at 09:12

## 2020-08-27 RX ADMIN — METHYLPREDNISOLONE SODIUM SUCCINATE 100 MG: 125 INJECTION, POWDER, FOR SOLUTION INTRAMUSCULAR; INTRAVENOUS at 09:14

## 2020-08-28 ENCOUNTER — PATIENT MESSAGE (OUTPATIENT)
Dept: FAMILY MEDICINE CLINIC | Facility: CLINIC | Age: 65
End: 2020-08-28

## 2020-08-31 ENCOUNTER — INFUSION (OUTPATIENT)
Dept: ONCOLOGY | Facility: HOSPITAL | Age: 65
End: 2020-08-31

## 2020-08-31 VITALS
WEIGHT: 191.7 LBS | SYSTOLIC BLOOD PRESSURE: 138 MMHG | DIASTOLIC BLOOD PRESSURE: 78 MMHG | HEART RATE: 60 BPM | OXYGEN SATURATION: 99 % | RESPIRATION RATE: 16 BRPM | BODY MASS INDEX: 30.02 KG/M2 | TEMPERATURE: 97.8 F

## 2020-08-31 DIAGNOSIS — C90.00 MULTIPLE MYELOMA NOT HAVING ACHIEVED REMISSION (HCC): Primary | ICD-10-CM

## 2020-08-31 LAB
ABO GROUP BLD: NORMAL
BLD GP AB SCN SERPL QL: NEGATIVE
RH BLD: POSITIVE
T&S EXPIRATION DATE: NORMAL

## 2020-08-31 PROCEDURE — 96401 CHEMO ANTI-NEOPL SQ/IM: CPT

## 2020-08-31 PROCEDURE — 25010000002 BORTEZOMIB PER 0.1 MG: Performed by: INTERNAL MEDICINE

## 2020-08-31 RX ORDER — BORTEZOMIB 3.5 MG/1
1.3 INJECTION, POWDER, LYOPHILIZED, FOR SOLUTION INTRAVENOUS; SUBCUTANEOUS ONCE
Status: COMPLETED | OUTPATIENT
Start: 2020-08-31 | End: 2020-08-31

## 2020-08-31 RX ADMIN — BORTEZOMIB 2.6 MG: 3.5 INJECTION, POWDER, LYOPHILIZED, FOR SOLUTION INTRAVENOUS; SUBCUTANEOUS at 10:32

## 2020-09-01 ENCOUNTER — TELEPHONE (OUTPATIENT)
Dept: FAMILY MEDICINE CLINIC | Facility: CLINIC | Age: 65
End: 2020-09-01

## 2020-09-01 NOTE — TELEPHONE ENCOUNTER
----- Message from Gene Bond sent at 8/28/2020 10:37 AM EDT -----  Regarding: Prescription Question  Contact: [phone]  Dr HUSTON called in some IINSULIN PENS.   We live on $1600.00 a month, and if he gets in the dough-nut hole this early in his cancer treatments, we will be unable to buy his medicine. Therefor, I kindly as that you substitute the INSULIN PEN , that is $600  a box, to something more affordable such as Novalog, or some similarly priced insulin we can afford. Just a cheaper brand, I gave the name brand as example.  I hate to be a pain in the butt, but I like to eat too.  LOL  THANK YOU IN ADVANCE  we may not ever need this, but we were told to keep it on hand. Makes very good sense to us!!

## 2020-09-03 ENCOUNTER — INFUSION (OUTPATIENT)
Dept: ONCOLOGY | Facility: HOSPITAL | Age: 65
End: 2020-09-03

## 2020-09-03 VITALS
SYSTOLIC BLOOD PRESSURE: 146 MMHG | RESPIRATION RATE: 16 BRPM | OXYGEN SATURATION: 100 % | WEIGHT: 188 LBS | HEART RATE: 55 BPM | TEMPERATURE: 98.1 F | BODY MASS INDEX: 29.44 KG/M2 | DIASTOLIC BLOOD PRESSURE: 70 MMHG

## 2020-09-03 DIAGNOSIS — C90.00 MULTIPLE MYELOMA NOT HAVING ACHIEVED REMISSION (HCC): Primary | ICD-10-CM

## 2020-09-03 LAB
ABO GROUP BLD: NORMAL
ANISOCYTOSIS BLD QL: NORMAL
BASOPHILS # BLD AUTO: 0 10*3/MM3 (ref 0–0.2)
BASOPHILS NFR BLD AUTO: 0 % (ref 0–1.5)
DEPRECATED RDW RBC AUTO: 57.8 FL (ref 37–54)
EOSINOPHIL # BLD AUTO: 0.03 10*3/MM3 (ref 0–0.4)
EOSINOPHIL NFR BLD AUTO: 0.8 % (ref 0.3–6.2)
ERYTHROCYTE [DISTWIDTH] IN BLOOD BY AUTOMATED COUNT: 16.9 % (ref 12.3–15.4)
HCT VFR BLD AUTO: 25.1 % (ref 37.5–51)
HGB BLD-MCNC: 8.2 G/DL (ref 13–17.7)
IMM GRANULOCYTES # BLD AUTO: 0.03 10*3/MM3 (ref 0–0.05)
IMM GRANULOCYTES NFR BLD AUTO: 0.8 % (ref 0–0.5)
LYMPHOCYTES # BLD AUTO: 0.53 10*3/MM3 (ref 0.7–3.1)
LYMPHOCYTES NFR BLD AUTO: 15 % (ref 19.6–45.3)
MCH RBC QN AUTO: 30.9 PG (ref 26.6–33)
MCHC RBC AUTO-ENTMCNC: 32.7 G/DL (ref 31.5–35.7)
MCV RBC AUTO: 94.7 FL (ref 79–97)
MONOCYTES # BLD AUTO: 0.33 10*3/MM3 (ref 0.1–0.9)
MONOCYTES NFR BLD AUTO: 9.3 % (ref 5–12)
NEUTROPHILS NFR BLD AUTO: 2.62 10*3/MM3 (ref 1.7–7)
NEUTROPHILS NFR BLD AUTO: 74.1 % (ref 42.7–76)
NRBC BLD AUTO-RTO: 0 /100 WBC (ref 0–0.2)
PLATELET # BLD AUTO: 26 10*3/MM3 (ref 140–450)
PMV BLD AUTO: ABNORMAL FL
RBC # BLD AUTO: 2.65 10*6/MM3 (ref 4.14–5.8)
RH BLD: POSITIVE
SMALL PLATELETS BLD QL SMEAR: NORMAL
WBC # BLD AUTO: 3.54 10*3/MM3 (ref 3.4–10.8)
WBC MORPH BLD: NORMAL

## 2020-09-03 PROCEDURE — 96413 CHEMO IV INFUSION 1 HR: CPT

## 2020-09-03 PROCEDURE — 36415 COLL VENOUS BLD VENIPUNCTURE: CPT

## 2020-09-03 PROCEDURE — 85007 BL SMEAR W/DIFF WBC COUNT: CPT

## 2020-09-03 PROCEDURE — 85025 COMPLETE CBC W/AUTO DIFF WBC: CPT

## 2020-09-03 PROCEDURE — 96375 TX/PRO/DX INJ NEW DRUG ADDON: CPT

## 2020-09-03 PROCEDURE — 96401 CHEMO ANTI-NEOPL SQ/IM: CPT

## 2020-09-03 PROCEDURE — 25010000002 DIPHENHYDRAMINE PER 50 MG: Performed by: INTERNAL MEDICINE

## 2020-09-03 PROCEDURE — 36430 TRANSFUSION BLD/BLD COMPNT: CPT

## 2020-09-03 PROCEDURE — 96415 CHEMO IV INFUSION ADDL HR: CPT

## 2020-09-03 PROCEDURE — 25010000002 BORTEZOMIB PER 0.1 MG: Performed by: INTERNAL MEDICINE

## 2020-09-03 PROCEDURE — P9035 PLATELET PHERES LEUKOREDUCED: HCPCS

## 2020-09-03 PROCEDURE — 86901 BLOOD TYPING SEROLOGIC RH(D): CPT | Performed by: NURSE PRACTITIONER

## 2020-09-03 PROCEDURE — 86900 BLOOD TYPING SEROLOGIC ABO: CPT | Performed by: NURSE PRACTITIONER

## 2020-09-03 PROCEDURE — 25010000002 DARATUMUMAB 100 MG/5ML SOLUTION 20 ML VIAL: Performed by: INTERNAL MEDICINE

## 2020-09-03 PROCEDURE — 25010000002 DARATUMUMAB 100 MG/5ML SOLUTION 5 ML VIAL: Performed by: INTERNAL MEDICINE

## 2020-09-03 PROCEDURE — 25010000002 METHYLPREDNISOLONE PER 125 MG: Performed by: INTERNAL MEDICINE

## 2020-09-03 RX ORDER — SODIUM CHLORIDE 9 MG/ML
250 INJECTION, SOLUTION INTRAVENOUS ONCE
Status: DISCONTINUED | OUTPATIENT
Start: 2020-09-03 | End: 2020-09-03 | Stop reason: HOSPADM

## 2020-09-03 RX ORDER — SODIUM CHLORIDE 9 MG/ML
250 INJECTION, SOLUTION INTRAVENOUS AS NEEDED
Status: DISCONTINUED | OUTPATIENT
Start: 2020-09-03 | End: 2020-09-03 | Stop reason: HOSPADM

## 2020-09-03 RX ORDER — BORTEZOMIB 3.5 MG/1
1.3 INJECTION, POWDER, LYOPHILIZED, FOR SOLUTION INTRAVENOUS; SUBCUTANEOUS ONCE
Status: COMPLETED | OUTPATIENT
Start: 2020-09-03 | End: 2020-09-03

## 2020-09-03 RX ORDER — METHYLPREDNISOLONE SODIUM SUCCINATE 125 MG/2ML
100 INJECTION, POWDER, LYOPHILIZED, FOR SOLUTION INTRAMUSCULAR; INTRAVENOUS ONCE
Status: COMPLETED | OUTPATIENT
Start: 2020-09-03 | End: 2020-09-03

## 2020-09-03 RX ORDER — ACETAMINOPHEN 500 MG
1000 TABLET ORAL ONCE
Status: COMPLETED | OUTPATIENT
Start: 2020-09-03 | End: 2020-09-03

## 2020-09-03 RX ADMIN — BORTEZOMIB 2.6 MG: 3.5 INJECTION, POWDER, LYOPHILIZED, FOR SOLUTION INTRAVENOUS; SUBCUTANEOUS at 09:41

## 2020-09-03 RX ADMIN — DIPHENHYDRAMINE HYDROCHLORIDE 50 MG: 50 INJECTION INTRAMUSCULAR; INTRAVENOUS at 09:16

## 2020-09-03 RX ADMIN — DARATUMUMAB 1430 MG: 100 INJECTION, SOLUTION, CONCENTRATE INTRAVENOUS at 09:44

## 2020-09-03 RX ADMIN — ACETAMINOPHEN 1000 MG: 500 TABLET, FILM COATED ORAL at 09:00

## 2020-09-03 RX ADMIN — METHYLPREDNISOLONE SODIUM SUCCINATE 100 MG: 125 INJECTION, POWDER, FOR SOLUTION INTRAMUSCULAR; INTRAVENOUS at 09:01

## 2020-09-04 ENCOUNTER — TELEPHONE (OUTPATIENT)
Dept: ONCOLOGY | Facility: CLINIC | Age: 65
End: 2020-09-04

## 2020-09-04 ENCOUNTER — HOSPITAL ENCOUNTER (EMERGENCY)
Facility: HOSPITAL | Age: 65
Discharge: LEFT BEFORE DISCHARGE | End: 2020-09-04
Attending: FAMILY MEDICINE
Payer: MEDICARE

## 2020-09-04 ENCOUNTER — TELEPHONE (OUTPATIENT)
Dept: FAMILY MEDICINE CLINIC | Facility: CLINIC | Age: 65
End: 2020-09-04

## 2020-09-04 VITALS
SYSTOLIC BLOOD PRESSURE: 138 MMHG | TEMPERATURE: 98.1 F | WEIGHT: 188 LBS | OXYGEN SATURATION: 99 % | HEIGHT: 68 IN | BODY MASS INDEX: 28.49 KG/M2 | HEART RATE: 68 BPM | RESPIRATION RATE: 18 BRPM | DIASTOLIC BLOOD PRESSURE: 78 MMHG

## 2020-09-04 LAB
ANION GAP SERPL CALCULATED.3IONS-SCNC: 16 MMOL/L (ref 3–16)
BASOPHILS ABSOLUTE: 0 K/UL (ref 0–0.1)
BASOPHILS RELATIVE PERCENT: 0 %
BH BB BLOOD EXPIRATION DATE: NORMAL
BH BB BLOOD TYPE BARCODE: 6200
BH BB DISPENSE STATUS: NORMAL
BH BB PRODUCT CODE: NORMAL
BH BB UNIT NUMBER: NORMAL
BILIRUBIN URINE: NEGATIVE
BLOOD, URINE: NEGATIVE
BUN BLDV-MCNC: 64 MG/DL (ref 6–20)
CALCIUM SERPL-MCNC: 9.3 MG/DL (ref 8.5–10.5)
CHLORIDE BLD-SCNC: 98 MMOL/L (ref 98–107)
CHP ED QC CHECK: NORMAL
CHP ED QC CHECK: NORMAL
CLARITY: CLEAR
CO2: 20 MMOL/L (ref 20–30)
COLOR: YELLOW
CREAT SERPL-MCNC: 2.2 MG/DL (ref 0.4–1.2)
EOSINOPHILS ABSOLUTE: 0 K/UL (ref 0–0.4)
EOSINOPHILS RELATIVE PERCENT: 0 %
GFR AFRICAN AMERICAN: 36
GFR NON-AFRICAN AMERICAN: 30
GLUCOSE BLD-MCNC: 372 MG/DL
GLUCOSE BLD-MCNC: 372 MG/DL (ref 74–106)
GLUCOSE BLD-MCNC: 438 MG/DL (ref 74–106)
GLUCOSE BLD-MCNC: 519 MG/DL
GLUCOSE BLD-MCNC: 519 MG/DL (ref 74–106)
GLUCOSE BLD-MCNC: 528 MG/DL (ref 74–106)
GLUCOSE URINE: 500 MG/DL
HCT VFR BLD CALC: 26.2 % (ref 40–54)
HEMOGLOBIN: 8.2 G/DL (ref 13–18)
IMMATURE GRANULOCYTES #: 0 K/UL
IMMATURE GRANULOCYTES %: 0.9 % (ref 0–5)
KETONES, URINE: NEGATIVE MG/DL
LEUKOCYTE ESTERASE, URINE: NEGATIVE
LYMPHOCYTES ABSOLUTE: 0.2 K/UL (ref 1.5–4)
LYMPHOCYTES RELATIVE PERCENT: 4.6 %
MCH RBC QN AUTO: 30.4 PG (ref 27–32)
MCHC RBC AUTO-ENTMCNC: 31.3 G/DL (ref 31–35)
MCV RBC AUTO: 97 FL (ref 80–100)
MICROSCOPIC EXAMINATION: ABNORMAL
MONOCYTES ABSOLUTE: 0.2 K/UL (ref 0.2–0.8)
MONOCYTES RELATIVE PERCENT: 5.5 %
NEUTROPHILS ABSOLUTE: 2.9 K/UL (ref 2–7.5)
NEUTROPHILS RELATIVE PERCENT: 89 %
NITRITE, URINE: NEGATIVE
PDW BLD-RTO: 17 % (ref 11–16)
PERFORMED ON: ABNORMAL
PH UA: 5 (ref 5–8)
PLATELET # BLD: 60 K/UL (ref 150–400)
PMV BLD AUTO: 13.1 FL (ref 6–10)
POTASSIUM SERPL-SCNC: 5.5 MMOL/L (ref 3.4–5.1)
PROTEIN UA: NEGATIVE MG/DL
RBC # BLD: 2.7 M/UL (ref 4.5–6)
SODIUM BLD-SCNC: 134 MMOL/L (ref 136–145)
SPECIFIC GRAVITY UA: 1.01 (ref 1–1.03)
UNIT  ABO: NORMAL
UNIT  RH: NORMAL
URINE REFLEX TO CULTURE: ABNORMAL
URINE TYPE: ABNORMAL
UROBILINOGEN, URINE: 0.2 E.U./DL
WBC # BLD: 3.3 K/UL (ref 4–11)

## 2020-09-04 PROCEDURE — 6370000000 HC RX 637 (ALT 250 FOR IP)

## 2020-09-04 PROCEDURE — 2580000003 HC RX 258: Performed by: FAMILY MEDICINE

## 2020-09-04 PROCEDURE — 36415 COLL VENOUS BLD VENIPUNCTURE: CPT

## 2020-09-04 PROCEDURE — 6370000000 HC RX 637 (ALT 250 FOR IP): Performed by: FAMILY MEDICINE

## 2020-09-04 PROCEDURE — 96374 THER/PROPH/DIAG INJ IV PUSH: CPT

## 2020-09-04 PROCEDURE — 99284 EMERGENCY DEPT VISIT MOD MDM: CPT

## 2020-09-04 PROCEDURE — 80048 BASIC METABOLIC PNL TOTAL CA: CPT

## 2020-09-04 PROCEDURE — 85025 COMPLETE CBC W/AUTO DIFF WBC: CPT

## 2020-09-04 PROCEDURE — 81003 URINALYSIS AUTO W/O SCOPE: CPT

## 2020-09-04 RX ORDER — 0.9 % SODIUM CHLORIDE 0.9 %
1000 INTRAVENOUS SOLUTION INTRAVENOUS ONCE
Status: COMPLETED | OUTPATIENT
Start: 2020-09-04 | End: 2020-09-04

## 2020-09-04 RX ORDER — INSULIN GLARGINE 100 [IU]/ML
20 INJECTION, SOLUTION SUBCUTANEOUS DAILY
Qty: 3 PEN | Refills: 5 | Status: SHIPPED | OUTPATIENT
Start: 2020-09-04 | End: 2021-01-01

## 2020-09-04 RX ADMIN — Medication 10 UNITS: at 16:27

## 2020-09-04 RX ADMIN — SODIUM CHLORIDE 1000 ML: 9 INJECTION, SOLUTION INTRAVENOUS at 16:27

## 2020-09-04 RX ADMIN — SODIUM CHLORIDE 1000 ML: 9 INJECTION, SOLUTION INTRAVENOUS at 17:50

## 2020-09-04 ASSESSMENT — ENCOUNTER SYMPTOMS
TROUBLE SWALLOWING: 0
COUGH: 0
VOMITING: 0
SHORTNESS OF BREATH: 0
NAUSEA: 0

## 2020-09-04 NOTE — TELEPHONE ENCOUNTER
PT. SEE'S DR. GARDNER.  WIFE, LUIS, CALLING RE. ATTEMPTING TO GET PT'S. DIABETIC MEDICATION FOR OVER 1 WEEK NOW.  PT'S. BLOOD SUGAR , HIGH FOR PT.  NEEDING INSULIN.    RX=[facility].    PT'S. WIFE CAN BE REACHED @ [phone].

## 2020-09-04 NOTE — ED NOTES
I did discuss our sliding scale with pt. He understands how to read it and determine how much insulin he will need to take depending on how high his blood glucose number is. Pt did use insulin syringe to draw up water x 3 as he return demonstrated this technique. This will be pts first time using insulin on himself however he states that he had to give insulin to his dog for many years. He also advises me that his wife has somewhat of a medical background. Her father and brother were both doctors. We discussed locations for his sub q injections with the abdomen being the easiest place for insulin if he is by himself and the back of his arm if he has his wife help him. Pt states agreement and understanding of this procedure.      Emerson Habermann, RN  09/04/20 [id number]

## 2020-09-04 NOTE — ED TRIAGE NOTES
PT STATES HE WAS RECENTLY DX WITH MULTIPLE MYELOMA. HE HAS been taking chemo and steroids for two   He states that his bg is 600. He has been trying to get his pcp to call in insulin to use while he is taking this chemo and steroids.

## 2020-09-04 NOTE — ED NOTES
Pt is not in his room. call to supervisor to see if another nurse discharged him     Juan Patient, RN  09/04/20 0249

## 2020-09-04 NOTE — TELEPHONE ENCOUNTER
Received call from wife on triage line. She is requesting Dr Caballero send insulin Rx to [facility] pharmacy because they have been unable to get in touch with anyone at PCP office and his bg was 592mg/dL.   I called and was able to speak with staff at Dr Bolden's office. They are working on processing that Rx.   Called Nunu back to let her know they will refill his med, but in the meantime she needs to take him to the ER for DKA & to be monitored while they safely lower blood sugar level. Pt c/o urinary symptoms, increased thirst, and confusion. Wife was getting dressed and taking him promptly.

## 2020-09-04 NOTE — ED PROVIDER NOTES
[address] [place] [address] Court  eMERGENCY dEPARTMENT eNCOUnter      Pt Name: Ernie Ngo  MRN: [id number]  [place] 1955  Date of evaluation: 9/4/2020  Provider: Roxy Silva MD    [address] [place] [address]       Chief Complaint   Patient presents with    Hyperglycemia         HISTORY OF PRESENT ILLNESS   (Location/Symptom, Timing/Onset, Context/Setting, Quality, Duration, Modifying Factors, Severity)  Note limiting factors. Ernie Ngo is a 77 y.o. male who presents to the emergency department because his glucometer registered over 600 today. Patient states that he was recently diagnosed with multiple myeloma and he has begun chemotherapy and steroids. He states that when he is on steroids he is supposed to be taking insulin along with his oral medication but he was not prescribed the insulin so his sugars have been running very high. He denies nausea and vomiting. Nursing Notes were reviewed. REVIEW OF SYSTEMS    (2-9 systems for level 4, 10 or more forlevel 5)     Review of Systems   Constitutional: Negative for chills and fever. HENT: Negative for trouble swallowing. Respiratory: Negative for cough and shortness of breath. Gastrointestinal: Negative for nausea and vomiting. Neurological: Negative for syncope, speech difficulty and weakness. Except as noted above the remainder of the review of systems was reviewed and negative.        PAST MEDICAL HISTORY     Past Medical History:   Diagnosis Date    Acid reflux     CAD (coronary artery disease)     Central sleep apnea     DVT (deep venous thrombosis) ([facility] 2001    Hyperlipidemia     MI (myocardial infarction) ([facility] 1993    Multiple myeloma (HCC)     Polycythemia vera(238.4)     Ruptured lumbar disc     L3, L4         SURGICAL HISTORY       Past Surgical History:   Procedure Laterality Date    ANKLE SURGERY      left ankle skin graph     APPENDECTOMY      ARM DEBRIDEMENT      right arm surgery; (staph)    BACK SURGERY      CORONARY ANGIOPLASTY WITH STENT PLACEMENT  2009    TOTAL KNEE ARTHROPLASTY      both          CURRENT MEDICATIONS       Previous Medications    ASPIRIN 81 MG EC TABLET    Take 81 mg by mouth daily. ATORVASTATIN (LIPITOR) 80 MG TABLET    Take 80 mg by mouth nightly    GABAPENTIN (NEURONTIN) 600 MG TABLET    Take 800 mg by mouth 3 times daily. GLIMEPIRIDE (AMARYL) 2 MG TABLET    Take 2 mg by mouth 2 times daily     LISINOPRIL (PRINIVIL;ZESTRIL) 10 MG TABLET    Take 10 mg by mouth daily. METFORMIN (GLUCOPHAGE) 1000 MG TABLET    Take 1,000 mg by mouth 2 times daily    METHADONE (DOLOPHINE) 10 MG TABLET    Take 20 mg by mouth 2 times daily. METOPROLOL (LOPRESSOR) 25 MG TABLET    Take 25 mg by mouth 2 times daily. NAPROXEN (NAPROSYN) 500 MG TABLET    Take 1 tablet by mouth 2 times daily as needed for Pain    PANTOPRAZOLE (PROTONIX) 40 MG TABLET    Take 40 mg by mouth daily    TIZANIDINE (ZANAFLEX) 4 MG TABLET    Take 4 mg by mouth 3 times daily    WARFARIN SODIUM (COUMADIN PO)    Take 4 mg by mouth daily        ALLERGIES     Patient has no known allergies.     FAMILY HISTORY       Family History   Problem Relation Age of Onset    Diabetes Mother           SOCIAL HISTORY       Social History     Socioeconomic History    Marital status:      Spouse name: None    Number of children: None    Years of education: None    Highest education level: None   Occupational History    None   Social Needs    Financial resource strain: None    Food insecurity     Worry: None     Inability: None    Transportation needs     Medical: None     Non-medical: None   Tobacco Use    Smoking status: Former Smoker     Years: 37.00     Last attempt to quit: 2006     Years since quittin.6    Smokeless tobacco: Never Used   Substance and Sexual Activity    Alcohol use: No    Drug use: No    Sexual activity: None   Lifestyle    Physical activity     Days per week: None     Minutes per session: None    Stress: None   Relationships    Social connections     Talks on phone: None     Gets together: None     Attends Hoahaoism service: None     Active member of club or organization: None     Attends meetings of clubs or organizations: None     Relationship status: None    Intimate partner violence     Fear of current or ex partner: None     Emotionally abused: None     Physically abused: None     Forced sexual activity: None   Other Topics Concern    None   Social History Narrative    None       SCREENINGS    [place] Coma Scale  Eye Opening: Spontaneous  Best Verbal Response: Oriented  Best Motor Response: Obeys commands  Smooth Coma Scale Score: 15        PHYSICAL EXAM    (up to 7 for level 4, 8 or more for level 5)     ED Triage Vitals [09/04/20 1557]   BP Temp Temp Source Pulse Resp SpO2 Height Weight   (!) 164/97 98.1 °F (36.7 °C) Oral 69 18 98 % 5' 8\" (1.727 m) 188 lb (85.3 kg)       Physical Exam  Vitals signs and nursing note reviewed. Constitutional:       Appearance: He is not ill-appearing. HENT:      Mouth/Throat:      Mouth: Mucous membranes are dry. Eyes:      Extraocular Movements: Extraocular movements intact. Conjunctiva/sclera: Conjunctivae normal.      Pupils: Pupils are equal, round, and reactive to light. Neck:      Musculoskeletal: Normal range of motion and neck supple. Cardiovascular:      Rate and Rhythm: Normal rate and regular rhythm. Pulmonary:      Effort: Pulmonary effort is normal.      Breath sounds: Normal breath sounds. Abdominal:      General: Abdomen is flat. Bowel sounds are normal.      Tenderness: There is no abdominal tenderness. Neurological:      General: No focal deficit present. Mental Status: He is oriented to person, place, and time.          DIAGNOSTIC RESULTS     EKG: All EKG's are interpreted by the Emergency Department Physician who either signs or Co-signs this chart in the absence of a cardiologist.        RADIOLOGY: Non-plain film images such as CT, Ultrasound and MRI are read by the radiologist. Scissors Able images are visualized and preliminarily interpreted by the emergency physician with the below findings:        Interpretation per the Radiologist below, if available at the time of this note:    No orders to display         ED BEDSIDE ULTRASOUND:   Performed by ED Physician - none    LABS:  Labs Reviewed   CBC WITH AUTO DIFFERENTIAL - Abnormal; Notable for the following components:       Result Value    WBC 3.3 (*)     RBC 2.70 (*)     Hemoglobin 8.2 (*)     Hematocrit 26.2 (*)     RDW 17.0 (*)     Platelets 60 (*)     MPV 13.1 (*)     Lymphocytes Absolute 0.2 (*)     All other components within normal limits    Narrative:     Performed at:  [address] [place] [address] Laboratory  [address] [place] [address], [place] [address]   Phone [phone]   BASIC METABOLIC PANEL - Abnormal; Notable for the following components:    Sodium 134 (*)     Potassium 5.5 (*)     Glucose 528 (*)     BUN 64 (*)     CREATININE 2.2 (*)     GFR Non- 30 (*)     GFR  36 (*)     All other components within normal limits    Narrative:     Sachanatanael Rayshawn tel. [phone],  Chemistry results called to and read back by Cyndi Dickens RN. KDE, 09/04/2020  16:58, by Kayla Ricketts  Performed at:  [address] [place] [address] Laboratory  [address] [place] [address], [place] [address]   Phone [phone]   URINE RT REFLEX TO CULTURE - Abnormal; Notable for the following components:    Glucose, Ur 500 (*)     All other components within normal limits    Narrative:     Performed at:  [address] [place] [address] Laboratory  [address] [place] [address], [place] [address]   Phone [phone]   POCT GLUCOSE - Abnormal; Notable for the following components:    POC Glucose 519 (*)     All other components within normal limits    Narrative:     Performed at:  [address] [place] [address] [facility]  [address] [facility] [place] [address]   Phone [phone]   POCT GLUCOSE - Abnormal; Notable for the following components:    POC Glucose 438 (*)     All other components within normal limits    Narrative:     Performed at:  [address] [facility]  [address] [facility],  [place], [place] [address]   Phone [phone]   POCT GLUCOSE - Normal   POCT GLUCOSE       All other labs were within normal range or not returned as of this dictation. EMERGENCY DEPARTMENT COURSE and DIFFERENTIALDIAGNOSIS/MDM:   Vitals:    Vitals:    09/04/20 1600 09/04/20 1615 09/04/20 1616 09/04/20 1632   BP: (!) 164/97 (!) 154/72 (!) 152/73 138/78   Pulse: 67 68 68    Resp:       Temp:       TempSrc:       SpO2: 97% 97% 99%    Weight:       Height:               CRITICALCARE TIME   Total Critical Care time was 0 minutes, excludingseparately reportable procedures. There was a high probabilityof clinically significant/life threatening deterioration in the patient's condition which required my urgent intervention. CONSULTS:  None    PROCEDURES:  None    FINAL IMPRESSION      1. Hyperglycemia        DISPOSITION/PLAN   DISPOSITION Decision To Discharge 09/04/2020 05:37:57 PM      PATIENT REFERRED TO:  [address] [place] [address]  [phone]    In 3 days        DISCHARGE MEDICATIONS:  New Prescriptions    INSULIN REGULAR (HUMULIN R) 100 UNIT/ML INJECTION    Use according to sliding scale discussed at your discharge.        (Please note that portions ofthis note were completed with a voice recognition program.  Efforts were made to edit the dictations but occasionally words are mis-transcribed.)    Marcella Freeman MD(electronically signed)  Attending Emergency Physician          Marcella Freeman MD  09/04/20 9491

## 2020-09-04 NOTE — TELEPHONE ENCOUNTER
Insulin was sent to mail order August 26 instead of local pharmacy.    Wife notified that I have sent it to correct pharmacy. They are currently in the ED in [place].

## 2020-09-05 NOTE — ED NOTES
Second call placed to  home number [phone]. Goes right to voice mail.   No voice mail set up     Emerson Habermann, RN  09/04/20 Thakkar Saima, [facility]  09/04/20 2011

## 2020-09-05 NOTE — ED NOTES
pts family called back, upset that police came to check on pt. Advised pt left with iv in place and we were concerned. Wife states pt took iv out on own stating you don't have to be einstein to take an iv out. States  was here for high blood sugar and was given insulin and fluids and was ignored after ambulance and helicopter got there. States no one came in room after ivf were unhooked. Pt walked out without telling anyone because he was tired of sitting on the hard bed.  Apology stated for perception of being ignored     aLith Hawkins, [facility]  09/04/20 7722

## 2020-09-05 NOTE — ED NOTES
After multiple calls to pts home number and no answer, contacted [facility] co dispatch and requested a well check r/t pts iv still intact.  Advised a unit would be sent to address given     Skylar Liz RN  09/04/20 2041

## 2020-09-08 ENCOUNTER — SPECIALTY PHARMACY (OUTPATIENT)
Dept: ONCOLOGY | Facility: HOSPITAL | Age: 65
End: 2020-09-08

## 2020-09-08 ENCOUNTER — INFUSION (OUTPATIENT)
Dept: ONCOLOGY | Facility: HOSPITAL | Age: 65
End: 2020-09-08

## 2020-09-08 VITALS
DIASTOLIC BLOOD PRESSURE: 77 MMHG | WEIGHT: 184.6 LBS | RESPIRATION RATE: 16 BRPM | TEMPERATURE: 97.6 F | HEART RATE: 60 BPM | BODY MASS INDEX: 28.91 KG/M2 | SYSTOLIC BLOOD PRESSURE: 150 MMHG

## 2020-09-08 DIAGNOSIS — C90.00 MULTIPLE MYELOMA NOT HAVING ACHIEVED REMISSION (HCC): ICD-10-CM

## 2020-09-08 DIAGNOSIS — C90.00 MULTIPLE MYELOMA NOT HAVING ACHIEVED REMISSION (HCC): Primary | ICD-10-CM

## 2020-09-08 LAB
ALBUMIN SERPL-MCNC: 3.3 G/DL (ref 3.5–5.2)
ALBUMIN/GLOB SERPL: 0.6 G/DL
ALP SERPL-CCNC: 50 U/L (ref 39–117)
ALT SERPL W P-5'-P-CCNC: 63 U/L (ref 1–41)
ANION GAP SERPL CALCULATED.3IONS-SCNC: 14.9 MMOL/L (ref 5–15)
AST SERPL-CCNC: 26 U/L (ref 1–40)
BILIRUB SERPL-MCNC: 0.4 MG/DL (ref 0–1.2)
BUN SERPL-MCNC: 42 MG/DL (ref 8–23)
BUN/CREAT SERPL: 21.5 (ref 7–25)
CALCIUM SPEC-SCNC: 8.7 MG/DL (ref 8.6–10.5)
CHLORIDE SERPL-SCNC: 99 MMOL/L (ref 98–107)
CO2 SERPL-SCNC: 23.1 MMOL/L (ref 22–29)
CREAT SERPL-MCNC: 1.95 MG/DL (ref 0.76–1.27)
DEPRECATED RDW RBC AUTO: 55.9 FL (ref 37–54)
ERYTHROCYTE [DISTWIDTH] IN BLOOD BY AUTOMATED COUNT: 16 % (ref 12.3–15.4)
GFR SERPL CREATININE-BSD FRML MDRD: 34 ML/MIN/1.73
GLOBULIN UR ELPH-MCNC: 5.7 GM/DL
GLUCOSE SERPL-MCNC: 66 MG/DL (ref 65–99)
HCT VFR BLD AUTO: 27.3 % (ref 37.5–51)
HGB BLD-MCNC: 8.8 G/DL (ref 13–17.7)
LYMPHOCYTES # BLD MANUAL: 0.65 10*3/MM3 (ref 0.7–3.1)
LYMPHOCYTES NFR BLD MANUAL: 17 % (ref 5–12)
LYMPHOCYTES NFR BLD MANUAL: 24 % (ref 19.6–45.3)
MCH RBC QN AUTO: 30.4 PG (ref 26.6–33)
MCHC RBC AUTO-ENTMCNC: 32.2 G/DL (ref 31.5–35.7)
MCV RBC AUTO: 94.5 FL (ref 79–97)
MONOCYTES # BLD AUTO: 0.46 10*3/MM3 (ref 0.1–0.9)
NEUTROPHILS # BLD AUTO: 1.59 10*3/MM3 (ref 1.7–7)
NEUTROPHILS NFR BLD MANUAL: 56 % (ref 42.7–76)
NEUTS BAND NFR BLD MANUAL: 3 % (ref 0–5)
PLAT MORPH BLD: NORMAL
PLATELET # BLD AUTO: 28 10*3/MM3 (ref 140–450)
PMV BLD AUTO: 13.7 FL (ref 6–12)
POTASSIUM SERPL-SCNC: 4.3 MMOL/L (ref 3.5–5.2)
PROT SERPL-MCNC: 9 G/DL (ref 6–8.5)
RBC # BLD AUTO: 2.89 10*6/MM3 (ref 4.14–5.8)
RBC MORPH BLD: NORMAL
SCAN SLIDE: NORMAL
SMALL PLATELETS BLD QL SMEAR: NORMAL
SODIUM SERPL-SCNC: 137 MMOL/L (ref 136–145)
WBC # BLD AUTO: 2.69 10*3/MM3 (ref 3.4–10.8)
WBC MORPH BLD: NORMAL

## 2020-09-08 PROCEDURE — 25010000002 BORTEZOMIB PER 0.1 MG: Performed by: INTERNAL MEDICINE

## 2020-09-08 PROCEDURE — 85025 COMPLETE CBC W/AUTO DIFF WBC: CPT | Performed by: NURSE PRACTITIONER

## 2020-09-08 PROCEDURE — 85007 BL SMEAR W/DIFF WBC COUNT: CPT | Performed by: NURSE PRACTITIONER

## 2020-09-08 PROCEDURE — 96401 CHEMO ANTI-NEOPL SQ/IM: CPT

## 2020-09-08 PROCEDURE — 80053 COMPREHEN METABOLIC PANEL: CPT | Performed by: NURSE PRACTITIONER

## 2020-09-08 RX ORDER — LENALIDOMIDE 10 MG/1
10 CAPSULE ORAL DAILY
Qty: 14 CAPSULE | Refills: 0 | Status: SHIPPED | OUTPATIENT
Start: 2020-09-08 | End: 2020-09-28 | Stop reason: SDUPTHER

## 2020-09-08 RX ORDER — BORTEZOMIB 3.5 MG/1
1.3 INJECTION, POWDER, LYOPHILIZED, FOR SOLUTION INTRAVENOUS; SUBCUTANEOUS ONCE
Status: COMPLETED | OUTPATIENT
Start: 2020-09-08 | End: 2020-09-08

## 2020-09-08 RX ADMIN — BORTEZOMIB 2.6 MG: 3.5 INJECTION, POWDER, LYOPHILIZED, FOR SOLUTION INTRAVENOUS; SUBCUTANEOUS at 14:41

## 2020-09-08 NOTE — PROGRESS NOTES
Oral Chemotherapy Teaching      Patient Name/:  Gene Bond   1955  Oral Chemotherapy Regimen:  Lenalidomide 10mg PO once daily x 14 days, followed by 7 days off (reduced starting dose d/t renal dysfunction) + Daratumumab on Days 1,8, and 15 + Velcade SQ on Days 1,4,8, and 11 every 21 days + Dexamethasone - Plan for 4 cycles per MD  Date Started Medication:   Cycle 1:   Cycle 2: anticipate 2020    Additional Notes:  Received refill request from [facility] for Revlimid. Plan for Cycle 2 on 2020. Released script for Revlimid 10mg PO once daily x 14 days, followed by 7 days off #14 with 0 RF to [facility] specialty pharmacy for continuation of treatment.

## 2020-09-11 ENCOUNTER — INFUSION (OUTPATIENT)
Dept: ONCOLOGY | Facility: HOSPITAL | Age: 65
End: 2020-09-11

## 2020-09-11 ENCOUNTER — TELEPHONE (OUTPATIENT)
Dept: ONCOLOGY | Facility: CLINIC | Age: 65
End: 2020-09-11

## 2020-09-11 VITALS
WEIGHT: 186 LBS | BODY MASS INDEX: 29.13 KG/M2 | RESPIRATION RATE: 16 BRPM | TEMPERATURE: 98 F | SYSTOLIC BLOOD PRESSURE: 130 MMHG | HEART RATE: 58 BPM | DIASTOLIC BLOOD PRESSURE: 70 MMHG | OXYGEN SATURATION: 100 %

## 2020-09-11 DIAGNOSIS — C90.00 MULTIPLE MYELOMA NOT HAVING ACHIEVED REMISSION (HCC): Primary | ICD-10-CM

## 2020-09-11 LAB
ABO GROUP BLD: NORMAL
ANION GAP SERPL CALCULATED.3IONS-SCNC: 18.2 MMOL/L (ref 5–15)
BASOPHILS # BLD AUTO: 0 10*3/MM3 (ref 0–0.2)
BASOPHILS NFR BLD AUTO: 0 % (ref 0–1.5)
BUN SERPL-MCNC: 51 MG/DL (ref 8–23)
BUN/CREAT SERPL: 26.2 (ref 7–25)
CALCIUM SPEC-SCNC: 8.9 MG/DL (ref 8.6–10.5)
CHLORIDE SERPL-SCNC: 100 MMOL/L (ref 98–107)
CO2 SERPL-SCNC: 19.8 MMOL/L (ref 22–29)
CREAT SERPL-MCNC: 1.95 MG/DL (ref 0.76–1.27)
DEPRECATED RDW RBC AUTO: 56.1 FL (ref 37–54)
EOSINOPHIL # BLD AUTO: 0 10*3/MM3 (ref 0–0.4)
EOSINOPHIL NFR BLD AUTO: 0 % (ref 0.3–6.2)
ERYTHROCYTE [DISTWIDTH] IN BLOOD BY AUTOMATED COUNT: 16.1 % (ref 12.3–15.4)
GFR SERPL CREATININE-BSD FRML MDRD: 34 ML/MIN/1.73
GLUCOSE SERPL-MCNC: 127 MG/DL (ref 65–99)
HCT VFR BLD AUTO: 26.2 % (ref 37.5–51)
HGB BLD-MCNC: 8.4 G/DL (ref 13–17.7)
HYPOCHROMIA BLD QL: ABNORMAL
IMM GRANULOCYTES # BLD AUTO: 0.02 10*3/MM3 (ref 0–0.05)
IMM GRANULOCYTES NFR BLD AUTO: 0.5 % (ref 0–0.5)
LYMPHOCYTES # BLD AUTO: 0.5 10*3/MM3 (ref 0.7–3.1)
LYMPHOCYTES # BLD MANUAL: 0.81 10*3/MM3 (ref 0.7–3.1)
LYMPHOCYTES NFR BLD AUTO: 12.3 % (ref 19.6–45.3)
LYMPHOCYTES NFR BLD MANUAL: 20 % (ref 19.6–45.3)
LYMPHOCYTES NFR BLD MANUAL: 20 % (ref 5–12)
MCH RBC QN AUTO: 30.2 PG (ref 26.6–33)
MCHC RBC AUTO-ENTMCNC: 32.1 G/DL (ref 31.5–35.7)
MCV RBC AUTO: 94.2 FL (ref 79–97)
MONOCYTES # BLD AUTO: 0.81 10*3/MM3 (ref 0.1–0.9)
MONOCYTES # BLD AUTO: 1.06 10*3/MM3 (ref 0.1–0.9)
MONOCYTES NFR BLD AUTO: 26.2 % (ref 5–12)
NEUTROPHILS # BLD AUTO: 2.43 10*3/MM3 (ref 1.7–7)
NEUTROPHILS NFR BLD AUTO: 2.47 10*3/MM3 (ref 1.7–7)
NEUTROPHILS NFR BLD AUTO: 61 % (ref 42.7–76)
NEUTROPHILS NFR BLD MANUAL: 51 % (ref 42.7–76)
NEUTS BAND NFR BLD MANUAL: 9 % (ref 0–5)
NRBC BLD AUTO-RTO: 0 /100 WBC (ref 0–0.2)
PLATELET # BLD AUTO: 19 10*3/MM3 (ref 140–450)
PMV BLD AUTO: ABNORMAL FL
POTASSIUM SERPL-SCNC: 5.2 MMOL/L (ref 3.5–5.2)
RBC # BLD AUTO: 2.78 10*6/MM3 (ref 4.14–5.8)
RH BLD: POSITIVE
SCAN SLIDE: NORMAL
SMALL PLATELETS BLD QL SMEAR: ABNORMAL
SMALL PLATELETS BLD QL SMEAR: NORMAL
SODIUM SERPL-SCNC: 138 MMOL/L (ref 136–145)
WBC # BLD AUTO: 4.05 10*3/MM3 (ref 3.4–10.8)
WBC MORPH BLD: NORMAL

## 2020-09-11 PROCEDURE — 80048 BASIC METABOLIC PNL TOTAL CA: CPT

## 2020-09-11 PROCEDURE — 96375 TX/PRO/DX INJ NEW DRUG ADDON: CPT

## 2020-09-11 PROCEDURE — 86901 BLOOD TYPING SEROLOGIC RH(D): CPT | Performed by: INTERNAL MEDICINE

## 2020-09-11 PROCEDURE — 86900 BLOOD TYPING SEROLOGIC ABO: CPT | Performed by: INTERNAL MEDICINE

## 2020-09-11 PROCEDURE — 85025 COMPLETE CBC W/AUTO DIFF WBC: CPT

## 2020-09-11 PROCEDURE — 85007 BL SMEAR W/DIFF WBC COUNT: CPT

## 2020-09-11 PROCEDURE — 36415 COLL VENOUS BLD VENIPUNCTURE: CPT

## 2020-09-11 PROCEDURE — 25010000002 METHYLPREDNISOLONE PER 125 MG: Performed by: INTERNAL MEDICINE

## 2020-09-11 PROCEDURE — 36430 TRANSFUSION BLD/BLD COMPNT: CPT

## 2020-09-11 PROCEDURE — 25010000002 DARATUMUMAB 100 MG/5ML SOLUTION 5 ML VIAL: Performed by: INTERNAL MEDICINE

## 2020-09-11 PROCEDURE — P9035 PLATELET PHERES LEUKOREDUCED: HCPCS

## 2020-09-11 PROCEDURE — 96415 CHEMO IV INFUSION ADDL HR: CPT

## 2020-09-11 PROCEDURE — 25010000002 DIPHENHYDRAMINE PER 50 MG: Performed by: INTERNAL MEDICINE

## 2020-09-11 PROCEDURE — 25010000002 DARATUMUMAB 100 MG/5ML SOLUTION 20 ML VIAL: Performed by: INTERNAL MEDICINE

## 2020-09-11 PROCEDURE — 96413 CHEMO IV INFUSION 1 HR: CPT

## 2020-09-11 RX ORDER — SODIUM CHLORIDE 9 MG/ML
250 INJECTION, SOLUTION INTRAVENOUS AS NEEDED
Status: DISCONTINUED | OUTPATIENT
Start: 2020-09-11 | End: 2020-09-11 | Stop reason: HOSPADM

## 2020-09-11 RX ORDER — ACETAMINOPHEN 500 MG
1000 TABLET ORAL ONCE
Status: COMPLETED | OUTPATIENT
Start: 2020-09-11 | End: 2020-09-11

## 2020-09-11 RX ORDER — SODIUM CHLORIDE 9 MG/ML
250 INJECTION, SOLUTION INTRAVENOUS ONCE
Status: DISCONTINUED | OUTPATIENT
Start: 2020-09-11 | End: 2020-09-11 | Stop reason: HOSPADM

## 2020-09-11 RX ORDER — METHYLPREDNISOLONE SODIUM SUCCINATE 125 MG/2ML
60 INJECTION, POWDER, LYOPHILIZED, FOR SOLUTION INTRAMUSCULAR; INTRAVENOUS ONCE
Status: COMPLETED | OUTPATIENT
Start: 2020-09-11 | End: 2020-09-11

## 2020-09-11 RX ORDER — SODIUM CHLORIDE 9 MG/ML
250 INJECTION, SOLUTION INTRAVENOUS AS NEEDED
Status: CANCELLED | OUTPATIENT
Start: 2020-09-11

## 2020-09-11 RX ADMIN — ACETAMINOPHEN 1000 MG: 500 TABLET, FILM COATED ORAL at 09:00

## 2020-09-11 RX ADMIN — DIPHENHYDRAMINE HYDROCHLORIDE 25 MG: 50 INJECTION INTRAMUSCULAR; INTRAVENOUS at 09:03

## 2020-09-11 RX ADMIN — METHYLPREDNISOLONE SODIUM SUCCINATE 60 MG: 125 INJECTION, POWDER, FOR SOLUTION INTRAMUSCULAR; INTRAVENOUS at 09:01

## 2020-09-11 RX ADMIN — DARATUMUMAB 1430 MG: 100 INJECTION, SOLUTION, CONCENTRATE INTRAVENOUS at 10:21

## 2020-09-11 NOTE — TELEPHONE ENCOUNTER
----- Message from Selena Richmond RN sent at 9/11/2020  9:28 AM EDT -----  Regarding: CRITICAL      Critical Test Results      MD: Federico    Date: 9/11/20     Critical test result: PLT 19    Time results received: 0928

## 2020-09-11 NOTE — TELEPHONE ENCOUNTER
Order for 6 pack of plt with lab checks in 1 week. Orders instructed to Infusion nurse to hold Revlimid.

## 2020-09-12 LAB
BH BB BLOOD EXPIRATION DATE: NORMAL
BH BB BLOOD TYPE BARCODE: 600
BH BB DISPENSE STATUS: NORMAL
BH BB PRODUCT CODE: NORMAL
BH BB UNIT NUMBER: NORMAL
UNIT  ABO: NORMAL
UNIT  RH: NORMAL

## 2020-09-14 ENCOUNTER — TELEPHONE (OUTPATIENT)
Dept: ONCOLOGY | Facility: CLINIC | Age: 65
End: 2020-09-14

## 2020-09-14 NOTE — TELEPHONE ENCOUNTER
PT HAS QUESTIONS ABOUT HIS MEDICATIONS AND HOW HE IS SUPPOSE TO BE TAKING IT  STEROIDS, NEEDS TO KNOW IF HE IS SUPPOSE TO START THESE TODAY,   ALSO HAS QUESTION ABOUT HIS CHEMO MEDICATIONS  PLEASE CALL : LUIS DOSS   [phone]

## 2020-09-14 NOTE — TELEPHONE ENCOUNTER
Returned call to patient, speaking to wife to inform her to have patient take steriod once a week.

## 2020-09-14 NOTE — TELEPHONE ENCOUNTER
"Reached out to patients spouse. Reports they are currently in \"off week\" (days 15-21) of his Revlimid. Discussed continue to take acyclovir daily to prevent shingles reactivation and bactrim DS for prevention of PJP as prescribed. Pt reports he typically takes his Dexamethasone on Thurs/Fri, discussed he will continue this even during off week of Revlimid. Prescribed as Dexamethasone 20mg on Days 1,2,8,9, and 15, 16 of each 21 day cycle.  "

## 2020-09-18 ENCOUNTER — INFUSION (OUTPATIENT)
Dept: ONCOLOGY | Facility: HOSPITAL | Age: 65
End: 2020-09-18

## 2020-09-18 ENCOUNTER — OFFICE VISIT (OUTPATIENT)
Dept: ONCOLOGY | Facility: CLINIC | Age: 65
End: 2020-09-18

## 2020-09-18 VITALS
HEART RATE: 66 BPM | RESPIRATION RATE: 16 BRPM | DIASTOLIC BLOOD PRESSURE: 71 MMHG | OXYGEN SATURATION: 99 % | TEMPERATURE: 97.3 F | SYSTOLIC BLOOD PRESSURE: 118 MMHG | WEIGHT: 190 LBS | BODY MASS INDEX: 28.14 KG/M2 | HEIGHT: 69 IN

## 2020-09-18 VITALS — DIASTOLIC BLOOD PRESSURE: 66 MMHG | SYSTOLIC BLOOD PRESSURE: 124 MMHG | HEART RATE: 61 BPM

## 2020-09-18 DIAGNOSIS — C90.00 MULTIPLE MYELOMA NOT HAVING ACHIEVED REMISSION (HCC): Primary | ICD-10-CM

## 2020-09-18 LAB
ALBUMIN SERPL-MCNC: 3.4 G/DL (ref 3.5–5.2)
ALBUMIN/GLOB SERPL: 0.8 G/DL
ALP SERPL-CCNC: 76 U/L (ref 39–117)
ALT SERPL W P-5'-P-CCNC: 30 U/L (ref 1–41)
ANION GAP SERPL CALCULATED.3IONS-SCNC: 15.9 MMOL/L (ref 5–15)
AST SERPL-CCNC: 17 U/L (ref 1–40)
BASOPHILS # BLD AUTO: 0 10*3/MM3 (ref 0–0.2)
BASOPHILS NFR BLD AUTO: 0 % (ref 0–1.5)
BILIRUB SERPL-MCNC: 0.4 MG/DL (ref 0–1.2)
BUN SERPL-MCNC: 42 MG/DL (ref 8–23)
BUN/CREAT SERPL: 21.8 (ref 7–25)
CALCIUM SPEC-SCNC: 8.9 MG/DL (ref 8.6–10.5)
CHLORIDE SERPL-SCNC: 98 MMOL/L (ref 98–107)
CO2 SERPL-SCNC: 20.1 MMOL/L (ref 22–29)
CREAT SERPL-MCNC: 1.93 MG/DL (ref 0.76–1.27)
DEPRECATED RDW RBC AUTO: 53.4 FL (ref 37–54)
EOSINOPHIL # BLD AUTO: 0 10*3/MM3 (ref 0–0.4)
EOSINOPHIL NFR BLD AUTO: 0 % (ref 0.3–6.2)
ERYTHROCYTE [DISTWIDTH] IN BLOOD BY AUTOMATED COUNT: 15.8 % (ref 12.3–15.4)
GFR SERPL CREATININE-BSD FRML MDRD: 35 ML/MIN/1.73
GLOBULIN UR ELPH-MCNC: 4.2 GM/DL
GLUCOSE SERPL-MCNC: 159 MG/DL (ref 65–99)
HCT VFR BLD AUTO: 25.4 % (ref 37.5–51)
HGB BLD-MCNC: 8.2 G/DL (ref 13–17.7)
IMM GRANULOCYTES # BLD AUTO: 0.04 10*3/MM3 (ref 0–0.05)
IMM GRANULOCYTES NFR BLD AUTO: 1.7 % (ref 0–0.5)
LYMPHOCYTES # BLD AUTO: 0.49 10*3/MM3 (ref 0.7–3.1)
LYMPHOCYTES NFR BLD AUTO: 20.6 % (ref 19.6–45.3)
MCH RBC QN AUTO: 30.3 PG (ref 26.6–33)
MCHC RBC AUTO-ENTMCNC: 32.3 G/DL (ref 31.5–35.7)
MCV RBC AUTO: 93.7 FL (ref 79–97)
MONOCYTES # BLD AUTO: 0.23 10*3/MM3 (ref 0.1–0.9)
MONOCYTES NFR BLD AUTO: 9.7 % (ref 5–12)
NEUTROPHILS NFR BLD AUTO: 1.62 10*3/MM3 (ref 1.7–7)
NEUTROPHILS NFR BLD AUTO: 68 % (ref 42.7–76)
NRBC BLD AUTO-RTO: 0 /100 WBC (ref 0–0.2)
PLATELET # BLD AUTO: 77 10*3/MM3 (ref 140–450)
PMV BLD AUTO: 11.9 FL (ref 6–12)
POTASSIUM SERPL-SCNC: 5.5 MMOL/L (ref 3.5–5.2)
PROT SERPL-MCNC: 7.6 G/DL (ref 6–8.5)
RBC # BLD AUTO: 2.71 10*6/MM3 (ref 4.14–5.8)
SODIUM SERPL-SCNC: 134 MMOL/L (ref 136–145)
WBC # BLD AUTO: 2.38 10*3/MM3 (ref 3.4–10.8)

## 2020-09-18 PROCEDURE — 80053 COMPREHEN METABOLIC PANEL: CPT

## 2020-09-18 PROCEDURE — 25010000002 BORTEZOMIB PER 0.1 MG: Performed by: INTERNAL MEDICINE

## 2020-09-18 PROCEDURE — 85025 COMPLETE CBC W/AUTO DIFF WBC: CPT

## 2020-09-18 PROCEDURE — 25010000002 DARATUMUMAB-HYALURONIDASE-FIHJ 1800-30000 MG-UT/15ML SOLUTION: Performed by: INTERNAL MEDICINE

## 2020-09-18 PROCEDURE — 96401 CHEMO ANTI-NEOPL SQ/IM: CPT

## 2020-09-18 PROCEDURE — 96375 TX/PRO/DX INJ NEW DRUG ADDON: CPT

## 2020-09-18 PROCEDURE — 84165 PROTEIN E-PHORESIS SERUM: CPT

## 2020-09-18 PROCEDURE — 82784 ASSAY IGA/IGD/IGG/IGM EACH: CPT

## 2020-09-18 PROCEDURE — 25010000002 METHYLPREDNISOLONE PER 125 MG: Performed by: INTERNAL MEDICINE

## 2020-09-18 PROCEDURE — 86334 IMMUNOFIX E-PHORESIS SERUM: CPT

## 2020-09-18 PROCEDURE — 99214 OFFICE O/P EST MOD 30 MIN: CPT | Performed by: INTERNAL MEDICINE

## 2020-09-18 PROCEDURE — 25010000002 DIPHENHYDRAMINE PER 50 MG: Performed by: INTERNAL MEDICINE

## 2020-09-18 PROCEDURE — 96374 THER/PROPH/DIAG INJ IV PUSH: CPT

## 2020-09-18 PROCEDURE — 83883 ASSAY NEPHELOMETRY NOT SPEC: CPT

## 2020-09-18 PROCEDURE — 36415 COLL VENOUS BLD VENIPUNCTURE: CPT

## 2020-09-18 PROCEDURE — C9399 UNCLASSIFIED DRUGS OR BIOLOG: HCPCS | Performed by: INTERNAL MEDICINE

## 2020-09-18 RX ORDER — ACETAMINOPHEN 500 MG
1000 TABLET ORAL ONCE
Status: CANCELLED | OUTPATIENT
Start: 2020-09-25

## 2020-09-18 RX ORDER — BORTEZOMIB 3.5 MG/1
1.3 INJECTION, POWDER, LYOPHILIZED, FOR SOLUTION INTRAVENOUS; SUBCUTANEOUS ONCE
Status: CANCELLED | OUTPATIENT
Start: 2020-09-25

## 2020-09-18 RX ORDER — ACETAMINOPHEN 500 MG
1000 TABLET ORAL ONCE
Status: CANCELLED | OUTPATIENT
Start: 2020-09-18

## 2020-09-18 RX ORDER — BORTEZOMIB 3.5 MG/1
1.3 INJECTION, POWDER, LYOPHILIZED, FOR SOLUTION INTRAVENOUS; SUBCUTANEOUS ONCE
Status: CANCELLED | OUTPATIENT
Start: 2020-09-21

## 2020-09-18 RX ORDER — DIPHENHYDRAMINE HYDROCHLORIDE 50 MG/ML
50 INJECTION INTRAMUSCULAR; INTRAVENOUS AS NEEDED
Status: CANCELLED | OUTPATIENT
Start: 2020-09-18

## 2020-09-18 RX ORDER — DIPHENHYDRAMINE HYDROCHLORIDE 50 MG/ML
50 INJECTION INTRAMUSCULAR; INTRAVENOUS AS NEEDED
Status: CANCELLED | OUTPATIENT
Start: 2020-10-02

## 2020-09-18 RX ORDER — ACETAMINOPHEN 500 MG
1000 TABLET ORAL ONCE
Status: COMPLETED | OUTPATIENT
Start: 2020-09-18 | End: 2020-09-18

## 2020-09-18 RX ORDER — FAMOTIDINE 10 MG/ML
20 INJECTION, SOLUTION INTRAVENOUS AS NEEDED
Status: CANCELLED | OUTPATIENT
Start: 2020-01-01

## 2020-09-18 RX ORDER — METHYLPREDNISOLONE SODIUM SUCCINATE 125 MG/2ML
60 INJECTION, POWDER, LYOPHILIZED, FOR SOLUTION INTRAMUSCULAR; INTRAVENOUS ONCE
Status: CANCELLED | OUTPATIENT
Start: 2020-09-25

## 2020-09-18 RX ORDER — METHYLPREDNISOLONE SODIUM SUCCINATE 125 MG/2ML
60 INJECTION, POWDER, LYOPHILIZED, FOR SOLUTION INTRAMUSCULAR; INTRAVENOUS ONCE
Status: CANCELLED | OUTPATIENT
Start: 2020-01-01

## 2020-09-18 RX ORDER — SODIUM CHLORIDE 9 MG/ML
250 INJECTION, SOLUTION INTRAVENOUS ONCE
Status: CANCELLED | OUTPATIENT
Start: 2020-09-18

## 2020-09-18 RX ORDER — FAMOTIDINE 10 MG/ML
20 INJECTION, SOLUTION INTRAVENOUS AS NEEDED
Status: CANCELLED | OUTPATIENT
Start: 2020-09-18

## 2020-09-18 RX ORDER — SODIUM CHLORIDE 9 MG/ML
250 INJECTION, SOLUTION INTRAVENOUS ONCE
Status: CANCELLED | OUTPATIENT
Start: 2020-01-01

## 2020-09-18 RX ORDER — DIPHENHYDRAMINE HYDROCHLORIDE 50 MG/ML
50 INJECTION INTRAMUSCULAR; INTRAVENOUS AS NEEDED
Status: CANCELLED | OUTPATIENT
Start: 2020-09-25

## 2020-09-18 RX ORDER — BORTEZOMIB 3.5 MG/1
1.3 INJECTION, POWDER, LYOPHILIZED, FOR SOLUTION INTRAVENOUS; SUBCUTANEOUS ONCE
Status: CANCELLED | OUTPATIENT
Start: 2020-09-18

## 2020-09-18 RX ORDER — DIPHENHYDRAMINE HYDROCHLORIDE 50 MG/ML
50 INJECTION INTRAMUSCULAR; INTRAVENOUS AS NEEDED
Status: CANCELLED | OUTPATIENT
Start: 2020-01-01

## 2020-09-18 RX ORDER — BORTEZOMIB 3.5 MG/1
1.3 INJECTION, POWDER, LYOPHILIZED, FOR SOLUTION INTRAVENOUS; SUBCUTANEOUS ONCE
Status: CANCELLED | OUTPATIENT
Start: 2020-01-01

## 2020-09-18 RX ORDER — MEPERIDINE HYDROCHLORIDE 50 MG/ML
25 INJECTION INTRAMUSCULAR; INTRAVENOUS; SUBCUTANEOUS
Status: CANCELLED | OUTPATIENT
Start: 2020-01-01

## 2020-09-18 RX ORDER — FAMOTIDINE 10 MG/ML
20 INJECTION, SOLUTION INTRAVENOUS AS NEEDED
Status: CANCELLED | OUTPATIENT
Start: 2020-09-25

## 2020-09-18 RX ORDER — SODIUM CHLORIDE 9 MG/ML
250 INJECTION, SOLUTION INTRAVENOUS ONCE
Status: CANCELLED | OUTPATIENT
Start: 2020-10-02

## 2020-09-18 RX ORDER — METHYLPREDNISOLONE SODIUM SUCCINATE 125 MG/2ML
60 INJECTION, POWDER, LYOPHILIZED, FOR SOLUTION INTRAMUSCULAR; INTRAVENOUS ONCE
Status: COMPLETED | OUTPATIENT
Start: 2020-09-18 | End: 2020-09-18

## 2020-09-18 RX ORDER — BORTEZOMIB 3.5 MG/1
1.3 INJECTION, POWDER, LYOPHILIZED, FOR SOLUTION INTRAVENOUS; SUBCUTANEOUS ONCE
Status: COMPLETED | OUTPATIENT
Start: 2020-09-18 | End: 2020-09-18

## 2020-09-18 RX ORDER — MEPERIDINE HYDROCHLORIDE 50 MG/ML
25 INJECTION INTRAMUSCULAR; INTRAVENOUS; SUBCUTANEOUS
Status: CANCELLED | OUTPATIENT
Start: 2020-09-18

## 2020-09-18 RX ORDER — ACETAMINOPHEN 500 MG
1000 TABLET ORAL ONCE
Status: CANCELLED | OUTPATIENT
Start: 2020-01-01

## 2020-09-18 RX ORDER — METHYLPREDNISOLONE SODIUM SUCCINATE 125 MG/2ML
60 INJECTION, POWDER, LYOPHILIZED, FOR SOLUTION INTRAMUSCULAR; INTRAVENOUS ONCE
Status: CANCELLED | OUTPATIENT
Start: 2020-10-02

## 2020-09-18 RX ORDER — SODIUM CHLORIDE 9 MG/ML
250 INJECTION, SOLUTION INTRAVENOUS ONCE
Status: CANCELLED | OUTPATIENT
Start: 2020-09-25

## 2020-09-18 RX ORDER — ACETAMINOPHEN 500 MG
1000 TABLET ORAL ONCE
Status: CANCELLED | OUTPATIENT
Start: 2020-10-02

## 2020-09-18 RX ORDER — SODIUM CHLORIDE 9 MG/ML
250 INJECTION, SOLUTION INTRAVENOUS ONCE
Status: DISCONTINUED | OUTPATIENT
Start: 2020-09-18 | End: 2020-09-18 | Stop reason: HOSPADM

## 2020-09-18 RX ORDER — MEPERIDINE HYDROCHLORIDE 50 MG/ML
25 INJECTION INTRAMUSCULAR; INTRAVENOUS; SUBCUTANEOUS
Status: CANCELLED | OUTPATIENT
Start: 2020-10-02

## 2020-09-18 RX ORDER — METHYLPREDNISOLONE SODIUM SUCCINATE 125 MG/2ML
60 INJECTION, POWDER, LYOPHILIZED, FOR SOLUTION INTRAMUSCULAR; INTRAVENOUS ONCE
Status: CANCELLED | OUTPATIENT
Start: 2020-09-18

## 2020-09-18 RX ORDER — BORTEZOMIB 3.5 MG/1
1.3 INJECTION, POWDER, LYOPHILIZED, FOR SOLUTION INTRAVENOUS; SUBCUTANEOUS ONCE
Status: CANCELLED | OUTPATIENT
Start: 2020-09-28

## 2020-09-18 RX ORDER — LENALIDOMIDE 25 MG/1
25 CAPSULE ORAL DAILY
Qty: 14 CAPSULE | Refills: 5 | Status: SHIPPED | OUTPATIENT
Start: 2020-09-18 | End: 2020-09-28 | Stop reason: SDUPTHER

## 2020-09-18 RX ORDER — MEPERIDINE HYDROCHLORIDE 50 MG/ML
25 INJECTION INTRAMUSCULAR; INTRAVENOUS; SUBCUTANEOUS
Status: CANCELLED | OUTPATIENT
Start: 2020-09-25

## 2020-09-18 RX ORDER — FAMOTIDINE 10 MG/ML
20 INJECTION, SOLUTION INTRAVENOUS AS NEEDED
Status: CANCELLED | OUTPATIENT
Start: 2020-10-02

## 2020-09-18 RX ADMIN — BORTEZOMIB 2.6 MG: 3.5 INJECTION, POWDER, LYOPHILIZED, FOR SOLUTION INTRAVENOUS; SUBCUTANEOUS at 10:29

## 2020-09-18 RX ADMIN — DARATUMUMAB AND HYALURONIDASE-FIHJ (HUMAN RECOMBINANT) 1800 MG: 1800; 30000 INJECTION SUBCUTANEOUS at 11:16

## 2020-09-18 RX ADMIN — METHYLPREDNISOLONE SODIUM SUCCINATE 60 MG: 125 INJECTION, POWDER, FOR SOLUTION INTRAMUSCULAR; INTRAVENOUS at 10:05

## 2020-09-18 RX ADMIN — DIPHENHYDRAMINE HYDROCHLORIDE 25 MG: 50 INJECTION INTRAMUSCULAR; INTRAVENOUS at 10:10

## 2020-09-18 RX ADMIN — ACETAMINOPHEN 1000 MG: 500 TABLET, FILM COATED ORAL at 10:05

## 2020-09-18 NOTE — PROGRESS NOTES
PROBLEM LIST:  Oncology/Hematology History   Multiple myeloma not having achieved remission (CMS/HCC)   8/18/2020 Initial Diagnosis    Multiple myeloma not having achieved remission (CMS/HCC)     8/20/2020 Cancer Staged    Staging form: Plasma Cell Myeloma And Plasma Cell Disorders, AJCC 8th Edition  - Clinical stage from 8/20/2020: RISS Stage III (Beta-2-microglobulin (mg/L): 16, Albumin (g/dL): 2.7, ISS: Stage III, High-risk cytogenetics: Present, LDH: Normal) - Signed by Merry Pelletier MD on 8/24/2020 8/27/2020 -  Chemotherapy    OP MULTIPLE MYELOMA Daratumumab / Bortezomib / Revlimid/Dexamethasone         REASON FOR VISIT: Newly diagnosed Myeloma    HISTORY OF PRESENT ILLNESS:   65 y.o.  male presents today for follow-up after undergoing induction chemotherapy with daratumumab, Velcade, Revlimid and dexamethasone.  He is having some issues with low platelet counts.  Otherwise seems to be tolerating it reasonably well.  He is currently on Coumadin.  So this makes things a little tricky.  I told him to discontinue his aspirin.  He is not having any issues with bleeding or bruising.    Past medical history, social history and family history was reviewed and unchanged from prior visit.    Review of Systems:    Review of Systems - Oncology         Medications:        Current Outpatient Medications:   •  acyclovir (ZOVIRAX) 400 MG tablet, Take 1 tablet by mouth 2 (Two) Times a Day. Take one tablet by mouth twice daily., Disp: 60 tablet, Rfl: 11  •  aspirin 81 MG EC tablet, Take 81 mg by mouth Daily., Disp: , Rfl:   •  atorvastatin (LIPITOR) 80 MG tablet, TAKE 1 TABLET BY MOUTH  EVERY NIGHT, Disp: 90 tablet, Rfl: 3  •  dexamethasone (DECADRON) 4 MG tablet, Take 20 mg, 5 tablets with food on Days 1, 2,  8, 9, 15,16 - Take with Food, Disp: 60 tablet, Rfl: 6  •  fluocinolone (SYNALAR) 0.01 % external solution, Apply  topically to the appropriate area as directed 2 (Two) Times a Day., Disp: 60 mL, Rfl: 11  •   gabapentin (NEURONTIN) 800 MG tablet, Take 800 mg by mouth 3 (Three) Times a Day., Disp: , Rfl:   •  glimepiride (AMARYL) 2 MG tablet, Take 1 tablet by mouth Daily., Disp: 90 tablet, Rfl: 1  •  glucose blood test strip, Use to test blood sugar two times daily., Disp: 100 each, Rfl: 12  •  Insulin Glargine (BASAGLAR KWIKPEN) 100 UNIT/ML injection pen, Inject 20 Units under the skin into the appropriate area as directed Daily., Disp: 3 pen, Rfl: 5  •  Insulin Pen Needle 31G X 6 MM misc, 1 stick Daily., Disp: 90 each, Rfl: 0  •  lenalidomide (REVLIMID) 10 MG capsule, Take 1 capsule by mouth Daily. on Days 1-14, and off 7 days. Adult Male REMS: [id number], Disp: 14 capsule, Rfl: 0  •  lenalidomide (REVLIMID) 10 MG capsule, Take 1 capsule by mouth Daily. on Days 1-14, and off 7 days. Adult Male REMS: [id number], Disp: 14 capsule, Rfl: 0  •  lisinopril (PRINIVIL,ZESTRIL) 10 MG tablet, Take 1 tablet by mouth Daily., Disp: 90 tablet, Rfl: 3  •  metFORMIN (GLUCOPHAGE) 1000 MG tablet, TAKE 1 TABLET BY MOUTH TWO  TIMES DAILY WITH MEALS, Disp: 180 tablet, Rfl: 3  •  methadone (DOLOPHINE) 10 MG tablet, Take 30 mg by mouth Every 6 (Six) Hours As Needed for Moderate Pain ,, Disp: , Rfl:   •  metoprolol tartrate (LOPRESSOR) 25 MG tablet, TAKE 1 TABLET BY MOUTH TWO  TIMES DAILY, Disp: 180 tablet, Rfl: 3  •  ondansetron (ZOFRAN) 8 MG tablet, Take 1 tablet by mouth 3 (Three) Times a Day As Needed for Nausea or Vomiting., Disp: 30 tablet, Rfl: 6  •  OneTouch Delica Lancets 33G misc, 1 application 3 (Three) Times a Day. Check blood sugar three times daily, E11.9, Disp: 100 each, Rfl: 11  •  pantoprazole (PROTONIX) 40 MG EC tablet, TAKE 1 TABLET BY MOUTH  DAILY, Disp: 90 tablet, Rfl: 3  •  sulfamethoxazole-trimethoprim (BACTRIM DS,SEPTRA DS) 800-160 MG per tablet, Take 1 tablet by mouth 3 (Three) Times a Week. Take 1 tablet on Mondays, Wednesdays and Fridays., Disp: 12 tablet, Rfl: 11  •  tiZANidine (ZANAFLEX) 4 MG tablet, Take 4 mg by mouth 3  "(Three) Times a Day As Needed for Muscle Spasms., Disp: , Rfl:   •  warfarin (COUMADIN) 4 MG tablet, Take 1 tablet by mouth Daily. Resume 10/1/18. 4mg daily but Take 7.5 tablet usually on Friday one time dose, Disp: 90 tablet, Rfl: 1    Pain Medications             aspirin 81 MG EC tablet Take 81 mg by mouth Daily.    dexamethasone (DECADRON) 4 MG tablet Take 20 mg, 5 tablets with food on Days 1, 2,  8, 9, 15,16 - Take with Food    gabapentin (NEURONTIN) 800 MG tablet Take 800 mg by mouth 3 (Three) Times a Day.    methadone (DOLOPHINE) 10 MG tablet Take 30 mg by mouth Every 6 (Six) Hours As Needed for Moderate Pain ,    tiZANidine (ZANAFLEX) 4 MG tablet Take 4 mg by mouth 3 (Three) Times a Day As Needed for Muscle Spasms.             ALLERGIES:  No Known Allergies      Physical Exam    VITAL SIGNS:  /71 Comment: LUE  Pulse 66   Temp 97.3 °F (36.3 °C) (Temporal)   Resp 16   Ht 175.3 cm (69\")   Wt 86.2 kg (190 lb)   SpO2 99% Comment: RA  BMI 28.06 kg/m²     Wt Readings from Last 3 Encounters:   09/18/20 86.2 kg (190 lb)   09/16/20 84.4 kg (186 lb)   09/11/20 84.4 kg (186 lb)       Body mass index is 28.06 kg/m². Body surface area is 2.02 meters squared.    Pain Score    09/18/20 0817   PainSc: 0-No pain         Performance Status: 0    General: well appearing, in no acute distress  HEENT: sclera anicteric, neck is supple  Lymphatics: no cervical, supraclavicular, or axillary adenopathy  Cardiovascular: regular rate and rhythm, no murmurs, rubs or gallops  Lungs: clear to auscultation bilaterally  Abdomen: soft, nontender, nondistended.  No palpable organomegaly  Extremities: no lower extremity edema  Skin: no rashes, lesions, bruising, or petechiae  Msk:  Shows no weakness of the large muscle groups  Psych: Mood is stable    Lab Results   Component Value Date    FINALDX  08/13/2020      PERIPHERAL BLOOD SMEAR, BONE MARROW ASPIRATE, CLOT SECTION AND BIOPSY WITH FLOW CYTOMETRY, IMMUNOHISTOCHEMISTRY AND " IN-SITU HYBRIDIZATION STUDIES:  Pancytopenia.  Involved by CD56 positive lambda clonal plasma cell neoplasm accounting for approximately 50% of marrow cellularity.  Mild reticulin fibrosis identified (MF-1).  No amyloid deposition identified on congo red stain with appropriate control.      PCC/dlb       ]      RECENT LABS:    Lab Results   Component Value Date    HGB 8.4 (L) 09/11/2020    HCT 26.2 (L) 09/11/2020    MCV 94.2 09/11/2020    PLT 19 (C) 09/11/2020    WBC 4.05 09/11/2020    NEUTROABS 2.47 09/11/2020    NEUTROABS 2.43 09/11/2020    LYMPHSABS 0.50 (L) 09/11/2020    MONOSABS 1.06 (H) 09/11/2020    EOSABS 0.00 09/11/2020    BASOSABS 0.00 09/11/2020       Lab Results   Component Value Date    GLUCOSE 127 (H) 09/11/2020    BUN 51 (H) 09/11/2020    CREATININE 1.95 (H) 09/11/2020     09/11/2020    K 5.2 09/11/2020     09/11/2020    CO2 19.8 (L) 09/11/2020    CALCIUM 8.9 09/11/2020    PROTEINTOT 9.0 (H) 09/08/2020    ALBUMIN 3.30 (L) 09/08/2020    BILITOT 0.4 09/08/2020    ALKPHOS 50 09/08/2020    AST 26 09/08/2020    ALT 63 (H) 09/08/2020       Lab Results   Component Value Date    MSPIKE 6.0 (H) 08/21/2020     Lab Results   Component Value Date    FREEKAPPAL 7.9 08/21/2020     Lab Results   Component Value Date    IGLFLC 1173.7 (H) 08/21/2020     Lab Results   Component Value Date    PLT 19 (C) 09/11/2020    PLT 28 (C) 09/08/2020    PLT 60 (L) 09/04/2020    PLT 26 (C) 09/03/2020    PLT 53 (L) 08/27/2020   ]          Assessment/Plan    1.  Multiple myeloma with renal dysfunction and pancytopenia.  Continue induction chemotherapy.  Seems to be tolerating it well.  Too early to tell if he is having a nice response or not.  We will plan to send him for transplant once we get his disease better controlled.    2.  Pancytopenia.  We will have to transfuse him to maintain his platelets over 50.    3.  Renal dysfunction secondary to myeloma.  His creatinine seems to be improving.      I spent a total of 25  minutes in direct patient care, greater than 20 minutes (greater than 50%) were spent in coordination of care, and counseling the patient regarding  Myeloma . Answered any questions patient had with medication and plan.      Merry Pelletier MD  [facility] Hematology and Oncology    Return on: 10/16/20  Return in (Approximately): Schedule with next infusion, 1 month    Orders Placed This Encounter   Procedures   • Comprehensive metabolic panel   • DORA, PE & Free LT Chains, Ser   • DORA, PE & Free LT Chains, Ser   • Comprehensive metabolic panel   • CBC and Differential   • CBC and Differential   • CBC and Differential   • CBC and Differential   • CBC and Differential   • CBC and Differential       9/18/2020

## 2020-09-18 NOTE — CODE DOCUMENTATION
No adverse reaction noted to Darzalex faspro. Kept pt for 2hrs post injection. No redness noted to injection site. Pt denied any complaints. V/S stable.  advised. Advised pt to let us know if having s/s of reaction. Symptoms explained to pt. Pt verbalized understanding.

## 2020-09-21 ENCOUNTER — INFUSION (OUTPATIENT)
Dept: ONCOLOGY | Facility: HOSPITAL | Age: 65
End: 2020-09-21

## 2020-09-21 VITALS
RESPIRATION RATE: 16 BRPM | SYSTOLIC BLOOD PRESSURE: 143 MMHG | WEIGHT: 185.9 LBS | BODY MASS INDEX: 27.45 KG/M2 | TEMPERATURE: 98.4 F | DIASTOLIC BLOOD PRESSURE: 77 MMHG | HEART RATE: 63 BPM

## 2020-09-21 DIAGNOSIS — C90.00 MULTIPLE MYELOMA NOT HAVING ACHIEVED REMISSION (HCC): Primary | ICD-10-CM

## 2020-09-21 LAB
ALBUMIN SERPL ELPH-MCNC: 3.4 G/DL (ref 2.9–4.4)
ALBUMIN/GLOB SERPL: 0.9 {RATIO} (ref 0.7–1.7)
ALPHA1 GLOB SERPL ELPH-MCNC: 0.2 G/DL (ref 0–0.4)
ALPHA2 GLOB SERPL ELPH-MCNC: 0.8 G/DL (ref 0.4–1)
B-GLOBULIN SERPL ELPH-MCNC: 2.6 G/DL (ref 0.7–1.3)
GAMMA GLOB SERPL ELPH-MCNC: 0.4 G/DL (ref 0.4–1.8)
GLOBULIN SER-MCNC: 4 G/DL (ref 2.2–3.9)
IGA SERPL-MCNC: 1922 MG/DL (ref 61–437)
IGG SERPL-MCNC: 449 MG/DL (ref 603–1613)
IGM SERPL-MCNC: 10 MG/DL (ref 20–172)
INTERPRETATION SERPL IEP-IMP: ABNORMAL
KAPPA LC FREE SER-MCNC: 9.7 MG/L (ref 3.3–19.4)
KAPPA LC FREE/LAMBDA FREE SER: 0.6 {RATIO} (ref 0.26–1.65)
LABORATORY COMMENT REPORT: ABNORMAL
LAMBDA LC FREE SERPL-MCNC: 16.1 MG/L (ref 5.7–26.3)
M PROTEIN SERPL ELPH-MCNC: ABNORMAL G/DL
PROT SERPL-MCNC: 7.4 G/DL (ref 6–8.5)

## 2020-09-21 PROCEDURE — 96401 CHEMO ANTI-NEOPL SQ/IM: CPT

## 2020-09-21 PROCEDURE — 25010000002 BORTEZOMIB PER 0.1 MG: Performed by: INTERNAL MEDICINE

## 2020-09-21 RX ORDER — BORTEZOMIB 3.5 MG/1
1.3 INJECTION, POWDER, LYOPHILIZED, FOR SOLUTION INTRAVENOUS; SUBCUTANEOUS ONCE
Status: COMPLETED | OUTPATIENT
Start: 2020-09-21 | End: 2020-09-21

## 2020-09-21 RX ADMIN — BORTEZOMIB 2.6 MG: 3.5 INJECTION, POWDER, LYOPHILIZED, FOR SOLUTION INTRAVENOUS; SUBCUTANEOUS at 13:52

## 2020-09-22 ENCOUNTER — DOCUMENTATION (OUTPATIENT)
Dept: NUTRITION | Facility: HOSPITAL | Age: 65
End: 2020-09-22

## 2020-09-22 NOTE — PROGRESS NOTES
Nutrition Services    Patient Name:  Gene Bond  YOB: 1955  MRN: [id number]  Admit Date:  (Not on file)    RD spoke with pt's wife over the phone who states pt is asleep but will call RD once he is awake. BENJAMIN provided contact information and is available PRN.    Electronically signed by:  Nancy Woodard RD  09/22/20 14:30 EDT

## 2020-09-25 ENCOUNTER — INFUSION (OUTPATIENT)
Dept: ONCOLOGY | Facility: HOSPITAL | Age: 65
End: 2020-09-25

## 2020-09-25 ENCOUNTER — TELEPHONE (OUTPATIENT)
Dept: ONCOLOGY | Facility: CLINIC | Age: 65
End: 2020-09-25

## 2020-09-25 VITALS
SYSTOLIC BLOOD PRESSURE: 123 MMHG | DIASTOLIC BLOOD PRESSURE: 62 MMHG | WEIGHT: 193 LBS | TEMPERATURE: 98.2 F | HEART RATE: 56 BPM | BODY MASS INDEX: 28.5 KG/M2 | RESPIRATION RATE: 16 BRPM

## 2020-09-25 DIAGNOSIS — C90.00 MULTIPLE MYELOMA NOT HAVING ACHIEVED REMISSION (HCC): Primary | ICD-10-CM

## 2020-09-25 LAB
BASOPHILS # BLD AUTO: 0 10*3/MM3 (ref 0–0.2)
BASOPHILS NFR BLD AUTO: 0 % (ref 0–1.5)
DEPRECATED RDW RBC AUTO: 54.9 FL (ref 37–54)
EOSINOPHIL # BLD AUTO: 0 10*3/MM3 (ref 0–0.4)
EOSINOPHIL NFR BLD AUTO: 0 % (ref 0.3–6.2)
ERYTHROCYTE [DISTWIDTH] IN BLOOD BY AUTOMATED COUNT: 15.8 % (ref 12.3–15.4)
HCT VFR BLD AUTO: 25.7 % (ref 37.5–51)
HGB BLD-MCNC: 8.3 G/DL (ref 13–17.7)
IMM GRANULOCYTES # BLD AUTO: 0.02 10*3/MM3 (ref 0–0.05)
IMM GRANULOCYTES NFR BLD AUTO: 0.5 % (ref 0–0.5)
LYMPHOCYTES # BLD AUTO: 0.68 10*3/MM3 (ref 0.7–3.1)
LYMPHOCYTES NFR BLD AUTO: 17.1 % (ref 19.6–45.3)
MCH RBC QN AUTO: 30.7 PG (ref 26.6–33)
MCHC RBC AUTO-ENTMCNC: 32.3 G/DL (ref 31.5–35.7)
MCV RBC AUTO: 95.2 FL (ref 79–97)
MONOCYTES # BLD AUTO: 0.34 10*3/MM3 (ref 0.1–0.9)
MONOCYTES NFR BLD AUTO: 8.6 % (ref 5–12)
NEUTROPHILS NFR BLD AUTO: 2.93 10*3/MM3 (ref 1.7–7)
NEUTROPHILS NFR BLD AUTO: 73.8 % (ref 42.7–76)
NRBC BLD AUTO-RTO: 0 /100 WBC (ref 0–0.2)
PLATELET # BLD AUTO: 64 10*3/MM3 (ref 140–450)
PMV BLD AUTO: 14.5 FL (ref 6–12)
RBC # BLD AUTO: 2.7 10*6/MM3 (ref 4.14–5.8)
WBC # BLD AUTO: 3.97 10*3/MM3 (ref 3.4–10.8)

## 2020-09-25 PROCEDURE — C9399 UNCLASSIFIED DRUGS OR BIOLOG: HCPCS | Performed by: INTERNAL MEDICINE

## 2020-09-25 PROCEDURE — 36415 COLL VENOUS BLD VENIPUNCTURE: CPT

## 2020-09-25 PROCEDURE — 25010000002 DIPHENHYDRAMINE PER 50 MG: Performed by: INTERNAL MEDICINE

## 2020-09-25 PROCEDURE — 85025 COMPLETE CBC W/AUTO DIFF WBC: CPT

## 2020-09-25 PROCEDURE — 96375 TX/PRO/DX INJ NEW DRUG ADDON: CPT

## 2020-09-25 PROCEDURE — 96372 THER/PROPH/DIAG INJ SC/IM: CPT

## 2020-09-25 PROCEDURE — 96401 CHEMO ANTI-NEOPL SQ/IM: CPT

## 2020-09-25 PROCEDURE — 25010000002 BORTEZOMIB PER 0.1 MG: Performed by: INTERNAL MEDICINE

## 2020-09-25 PROCEDURE — 25010000002 METHYLPREDNISOLONE PER 125 MG: Performed by: INTERNAL MEDICINE

## 2020-09-25 PROCEDURE — 25010000002 DARATUMUMAB-HYALURONIDASE-FIHJ 1800-30000 MG-UT/15ML SOLUTION: Performed by: INTERNAL MEDICINE

## 2020-09-25 RX ORDER — METHYLPREDNISOLONE SODIUM SUCCINATE 125 MG/2ML
60 INJECTION, POWDER, LYOPHILIZED, FOR SOLUTION INTRAMUSCULAR; INTRAVENOUS ONCE
Status: COMPLETED | OUTPATIENT
Start: 2020-09-25 | End: 2020-09-25

## 2020-09-25 RX ORDER — BORTEZOMIB 3.5 MG/1
1.3 INJECTION, POWDER, LYOPHILIZED, FOR SOLUTION INTRAVENOUS; SUBCUTANEOUS ONCE
Status: COMPLETED | OUTPATIENT
Start: 2020-09-25 | End: 2020-09-25

## 2020-09-25 RX ORDER — ACETAMINOPHEN 500 MG
1000 TABLET ORAL ONCE
Status: COMPLETED | OUTPATIENT
Start: 2020-09-25 | End: 2020-09-25

## 2020-09-25 RX ORDER — SODIUM CHLORIDE 9 MG/ML
250 INJECTION, SOLUTION INTRAVENOUS ONCE
Status: DISCONTINUED | OUTPATIENT
Start: 2020-09-25 | End: 2020-09-25 | Stop reason: HOSPADM

## 2020-09-25 RX ADMIN — BORTEZOMIB 2.6 MG: 3.5 INJECTION, POWDER, LYOPHILIZED, FOR SOLUTION INTRAVENOUS; SUBCUTANEOUS at 10:56

## 2020-09-25 RX ADMIN — ACETAMINOPHEN 1000 MG: 500 TABLET, FILM COATED ORAL at 09:28

## 2020-09-25 RX ADMIN — METHYLPREDNISOLONE SODIUM SUCCINATE 60 MG: 125 INJECTION, POWDER, FOR SOLUTION INTRAMUSCULAR; INTRAVENOUS at 09:28

## 2020-09-25 RX ADMIN — DARATUMUMAB AND HYALURONIDASE-FIHJ (HUMAN RECOMBINANT) 1800 MG: 1800; 30000 INJECTION SUBCUTANEOUS at 10:47

## 2020-09-25 RX ADMIN — DIPHENHYDRAMINE HYDROCHLORIDE 25 MG: 50 INJECTION INTRAMUSCULAR; INTRAVENOUS at 09:33

## 2020-09-25 NOTE — TELEPHONE ENCOUNTER
----- Message from Abena Milan RN sent at 9/25/2020  9:15 AM EDT -----  Regarding: Lab results/FYI  Platelets 64  Hgb 8.3

## 2020-09-28 ENCOUNTER — SPECIALTY PHARMACY (OUTPATIENT)
Dept: ONCOLOGY | Facility: HOSPITAL | Age: 65
End: 2020-09-28

## 2020-09-28 ENCOUNTER — INFUSION (OUTPATIENT)
Dept: ONCOLOGY | Facility: HOSPITAL | Age: 65
End: 2020-09-28

## 2020-09-28 VITALS
DIASTOLIC BLOOD PRESSURE: 78 MMHG | TEMPERATURE: 98.2 F | HEART RATE: 74 BPM | SYSTOLIC BLOOD PRESSURE: 157 MMHG | RESPIRATION RATE: 16 BRPM | WEIGHT: 190.8 LBS | BODY MASS INDEX: 28.18 KG/M2

## 2020-09-28 DIAGNOSIS — C90.00 MULTIPLE MYELOMA NOT HAVING ACHIEVED REMISSION (HCC): ICD-10-CM

## 2020-09-28 DIAGNOSIS — C90.00 MULTIPLE MYELOMA NOT HAVING ACHIEVED REMISSION (HCC): Primary | ICD-10-CM

## 2020-09-28 PROCEDURE — 96401 CHEMO ANTI-NEOPL SQ/IM: CPT

## 2020-09-28 PROCEDURE — 25010000002 BORTEZOMIB PER 0.1 MG: Performed by: INTERNAL MEDICINE

## 2020-09-28 RX ORDER — LENALIDOMIDE 10 MG/1
10 CAPSULE ORAL DAILY
Qty: 14 CAPSULE | Refills: 0 | Status: SHIPPED | OUTPATIENT
Start: 2020-09-28 | End: 2020-01-01 | Stop reason: SDUPTHER

## 2020-09-28 RX ORDER — BORTEZOMIB 3.5 MG/1
1.3 INJECTION, POWDER, LYOPHILIZED, FOR SOLUTION INTRAVENOUS; SUBCUTANEOUS ONCE
Status: COMPLETED | OUTPATIENT
Start: 2020-09-28 | End: 2020-09-28

## 2020-09-28 RX ADMIN — BORTEZOMIB 2.6 MG: 3.5 INJECTION, POWDER, LYOPHILIZED, FOR SOLUTION INTRAVENOUS; SUBCUTANEOUS at 13:12

## 2020-09-28 NOTE — PROGRESS NOTES
Oral Chemotherapy Teaching      Patient Name/:  Gene Bond   1955  Oral Chemotherapy Regimen:  Lenalidomide 10mg PO once daily x 14 days, followed by 7 days off (reduced starting dose d/t renal dysfunction) + Daratumumab on Days 1,8, and 15 + Velcade SQ on Days 1,4,8, and 11 every 21 days + Dexamethasone - Plan for 4 cycles per MD  Date Started Medication:   Cycle 1:   Cycle 2: 2020  Cycle 3: anticipate 10/9/2020    Additional Notes:  Received refill request from [facility] for Revlimid. Plan for Cycle 3 on 10/9/2020. Reviewed oncology office visit note dated 2020. Released script for Revlimid 10mg PO once daily x 14 days, followed by 7 days off #14 with 0 RF to [facility] specialty pharmacy for continuation of treatment.

## 2020-10-02 ENCOUNTER — INFUSION (OUTPATIENT)
Dept: ONCOLOGY | Facility: HOSPITAL | Age: 65
End: 2020-10-02

## 2020-10-02 VITALS
OXYGEN SATURATION: 98 % | SYSTOLIC BLOOD PRESSURE: 130 MMHG | RESPIRATION RATE: 16 BRPM | DIASTOLIC BLOOD PRESSURE: 58 MMHG | HEART RATE: 63 BPM | TEMPERATURE: 97.8 F | WEIGHT: 198.5 LBS | BODY MASS INDEX: 29.31 KG/M2

## 2020-10-02 DIAGNOSIS — C90.00 MULTIPLE MYELOMA NOT HAVING ACHIEVED REMISSION (HCC): Primary | ICD-10-CM

## 2020-10-02 LAB
ABO GROUP BLD: NORMAL
BASOPHILS # BLD AUTO: 0 10*3/MM3 (ref 0–0.2)
BASOPHILS NFR BLD AUTO: 0 % (ref 0–1.5)
DEPRECATED RDW RBC AUTO: 52.9 FL (ref 37–54)
EOSINOPHIL # BLD AUTO: 0.01 10*3/MM3 (ref 0–0.4)
EOSINOPHIL NFR BLD AUTO: 0.1 % (ref 0.3–6.2)
ERYTHROCYTE [DISTWIDTH] IN BLOOD BY AUTOMATED COUNT: 15.4 % (ref 12.3–15.4)
HCT VFR BLD AUTO: 27 % (ref 37.5–51)
HGB BLD-MCNC: 8.9 G/DL (ref 13–17.7)
IMM GRANULOCYTES # BLD AUTO: 0.02 10*3/MM3 (ref 0–0.05)
IMM GRANULOCYTES NFR BLD AUTO: 0.3 % (ref 0–0.5)
LYMPHOCYTES # BLD AUTO: 0.63 10*3/MM3 (ref 0.7–3.1)
LYMPHOCYTES NFR BLD AUTO: 8.8 % (ref 19.6–45.3)
MCH RBC QN AUTO: 30.6 PG (ref 26.6–33)
MCHC RBC AUTO-ENTMCNC: 33 G/DL (ref 31.5–35.7)
MCV RBC AUTO: 92.8 FL (ref 79–97)
MONOCYTES # BLD AUTO: 0.75 10*3/MM3 (ref 0.1–0.9)
MONOCYTES NFR BLD AUTO: 10.4 % (ref 5–12)
NEUTROPHILS NFR BLD AUTO: 5.79 10*3/MM3 (ref 1.7–7)
NEUTROPHILS NFR BLD AUTO: 80.4 % (ref 42.7–76)
NRBC BLD AUTO-RTO: 0 /100 WBC (ref 0–0.2)
PLATELET # BLD AUTO: 28 10*3/MM3 (ref 140–450)
RBC # BLD AUTO: 2.91 10*6/MM3 (ref 4.14–5.8)
RBC MORPH BLD: NORMAL
RH BLD: POSITIVE
SMALL PLATELETS BLD QL SMEAR: NORMAL
WBC # BLD AUTO: 7.2 10*3/MM3 (ref 3.4–10.8)
WBC MORPH BLD: NORMAL

## 2020-10-02 PROCEDURE — 85025 COMPLETE CBC W/AUTO DIFF WBC: CPT

## 2020-10-02 PROCEDURE — 96375 TX/PRO/DX INJ NEW DRUG ADDON: CPT

## 2020-10-02 PROCEDURE — 25010000002 METHYLPREDNISOLONE PER 125 MG: Performed by: INTERNAL MEDICINE

## 2020-10-02 PROCEDURE — 86901 BLOOD TYPING SEROLOGIC RH(D): CPT | Performed by: INTERNAL MEDICINE

## 2020-10-02 PROCEDURE — 96374 THER/PROPH/DIAG INJ IV PUSH: CPT

## 2020-10-02 PROCEDURE — 25010000002 DARATUMUMAB-HYALURONIDASE-FIHJ 1800-30000 MG-UT/15ML SOLUTION: Performed by: INTERNAL MEDICINE

## 2020-10-02 PROCEDURE — P9035 PLATELET PHERES LEUKOREDUCED: HCPCS

## 2020-10-02 PROCEDURE — 96401 CHEMO ANTI-NEOPL SQ/IM: CPT

## 2020-10-02 PROCEDURE — 25010000002 DIPHENHYDRAMINE PER 50 MG: Performed by: INTERNAL MEDICINE

## 2020-10-02 PROCEDURE — C9062 DARATUMUMAB HYALURONIDASE: HCPCS | Performed by: INTERNAL MEDICINE

## 2020-10-02 PROCEDURE — 85007 BL SMEAR W/DIFF WBC COUNT: CPT

## 2020-10-02 PROCEDURE — 36430 TRANSFUSION BLD/BLD COMPNT: CPT

## 2020-10-02 PROCEDURE — 86900 BLOOD TYPING SEROLOGIC ABO: CPT | Performed by: INTERNAL MEDICINE

## 2020-10-02 PROCEDURE — 36415 COLL VENOUS BLD VENIPUNCTURE: CPT

## 2020-10-02 RX ORDER — METHYLPREDNISOLONE SODIUM SUCCINATE 125 MG/2ML
60 INJECTION, POWDER, LYOPHILIZED, FOR SOLUTION INTRAMUSCULAR; INTRAVENOUS ONCE
Status: COMPLETED | OUTPATIENT
Start: 2020-10-02 | End: 2020-10-02

## 2020-10-02 RX ORDER — ACETAMINOPHEN 500 MG
1000 TABLET ORAL ONCE
Status: COMPLETED | OUTPATIENT
Start: 2020-10-02 | End: 2020-10-02

## 2020-10-02 RX ORDER — SODIUM CHLORIDE 9 MG/ML
250 INJECTION, SOLUTION INTRAVENOUS AS NEEDED
Status: DISCONTINUED | OUTPATIENT
Start: 2020-10-02 | End: 2020-10-02 | Stop reason: HOSPADM

## 2020-10-02 RX ORDER — SODIUM CHLORIDE 9 MG/ML
250 INJECTION, SOLUTION INTRAVENOUS ONCE
Status: DISCONTINUED | OUTPATIENT
Start: 2020-10-02 | End: 2020-10-02 | Stop reason: HOSPADM

## 2020-10-02 RX ADMIN — METHYLPREDNISOLONE SODIUM SUCCINATE 60 MG: 125 INJECTION, POWDER, FOR SOLUTION INTRAMUSCULAR; INTRAVENOUS at 09:15

## 2020-10-02 RX ADMIN — DIPHENHYDRAMINE HYDROCHLORIDE 25 MG: 50 INJECTION INTRAMUSCULAR; INTRAVENOUS at 09:18

## 2020-10-02 RX ADMIN — ACETAMINOPHEN 1000 MG: 500 TABLET, FILM COATED ORAL at 09:14

## 2020-10-02 RX ADMIN — DARATUMUMAB AND HYALURONIDASE-FIHJ (HUMAN RECOMBINANT) 1800 MG: 1800; 30000 INJECTION SUBCUTANEOUS at 10:09

## 2020-10-03 LAB
BH BB BLOOD EXPIRATION DATE: NORMAL
BH BB BLOOD TYPE BARCODE: 9500
BH BB DISPENSE STATUS: NORMAL
BH BB PRODUCT CODE: NORMAL
BH BB UNIT NUMBER: NORMAL
UNIT  ABO: NORMAL
UNIT  RH: NORMAL

## 2020-10-16 NOTE — PROGRESS NOTES
PROBLEM LIST:  Oncology/Hematology History   Multiple myeloma not having achieved remission (CMS/HCC)   8/18/2020 Initial Diagnosis    Multiple myeloma not having achieved remission (CMS/HCC)     8/20/2020 Cancer Staged    Staging form: Plasma Cell Myeloma And Plasma Cell Disorders, AJCC 8th Edition  - Clinical stage from 8/20/2020: RISS Stage III (Beta-2-microglobulin (mg/L): 16, Albumin (g/dL): 2.7, ISS: Stage III, High-risk cytogenetics: Present, LDH: Normal) - Signed by Merry Pelletier MD on 8/24/2020 8/27/2020 -  Chemotherapy    OP MULTIPLE MYELOMA Daratumumab / Bortezomib / Revlimid/Dexamethasone         REASON FOR VISIT: Newly diagnosed Myeloma    HISTORY OF PRESENT ILLNESS:   65 y.o.  male presents today for follow-up after undergoing induction chemotherapy with daratumumab, Velcade, Revlimid and dexamethasone.  He is in the midst of his third cycle of treatment.  I have had to give him platelet support because of being on Coumadin I want to maintain his platelets above 50,000.  He has occasional nosebleeds.  But he is tolerating this reasonably well.  He did have some fatigue.  No recent infections.  No nausea vomiting.  No significant neuropathy.    Past medical history, social history and family history was reviewed and unchanged from prior visit.    Review of Systems:    Review of Systems   Constitutional: Positive for fatigue.   HENT:   Positive for nosebleeds.    Eyes: Negative.    Respiratory: Negative.    Cardiovascular: Negative.    Gastrointestinal: Negative.    Endocrine: Negative.    Genitourinary: Negative.     Musculoskeletal: Negative.    Skin: Negative.    Neurological: Negative.    Hematological: Negative.    Psychiatric/Behavioral: Negative.             Medications:        Current Outpatient Medications:   •  acyclovir (ZOVIRAX) 400 MG tablet, Take 1 tablet by mouth 2 (Two) Times a Day. Take one tablet by mouth twice daily., Disp: 60 tablet, Rfl: 11  •  aspirin 81 MG EC tablet,  Take 81 mg by mouth Daily., Disp: , Rfl:   •  atorvastatin (LIPITOR) 80 MG tablet, TAKE 1 TABLET BY MOUTH  EVERY NIGHT, Disp: 90 tablet, Rfl: 3  •  dexamethasone (DECADRON) 4 MG tablet, Take 20 mg, 5 tablets with food on Days 1, 2,  8, 9, 15,16 - Take with Food, Disp: 60 tablet, Rfl: 6  •  fluocinolone (SYNALAR) 0.01 % external solution, Apply  topically to the appropriate area as directed 2 (Two) Times a Day., Disp: 60 mL, Rfl: 11  •  gabapentin (NEURONTIN) 800 MG tablet, Take 800 mg by mouth 3 (Three) Times a Day., Disp: , Rfl:   •  glimepiride (AMARYL) 2 MG tablet, Take 1 tablet by mouth Daily., Disp: 90 tablet, Rfl: 1  •  glucose blood test strip, Use to test blood sugar two times daily., Disp: 100 each, Rfl: 12  •  HumuLIN R 100 UNIT/ML injection, USE ACCORDING TO SLIDING SCALE AT DISCHARGE, Disp: , Rfl:   •  Insulin Glargine (BASAGLAR KWIKPEN) 100 UNIT/ML injection pen, Inject 20 Units under the skin into the appropriate area as directed Daily., Disp: 3 pen, Rfl: 5  •  Insulin Pen Needle 31G X 6 MM misc, 1 stick Daily., Disp: 90 each, Rfl: 0  •  lenalidomide (REVLIMID) 10 MG capsule, Take 1 capsule by mouth Daily. on Days 1-14, and off 7 days. Adult Male REMS: [id number], Disp: 14 capsule, Rfl: 0  •  lenalidomide (REVLIMID) 10 MG capsule, Take 1 capsule by mouth Daily. on Days 1-14, and off 7 days. Adult Male REMS: [id number], Disp: 14 capsule, Rfl: 0  •  lisinopril (PRINIVIL,ZESTRIL) 10 MG tablet, Take 1 tablet by mouth Daily., Disp: 90 tablet, Rfl: 3  •  metFORMIN (GLUCOPHAGE) 1000 MG tablet, TAKE 1 TABLET BY MOUTH TWO  TIMES DAILY WITH MEALS, Disp: 180 tablet, Rfl: 3  •  methadone (DOLOPHINE) 10 MG tablet, Take 30 mg by mouth Every 6 (Six) Hours As Needed for Moderate Pain ,, Disp: , Rfl:   •  metoprolol tartrate (LOPRESSOR) 25 MG tablet, TAKE 1 TABLET BY MOUTH TWO  TIMES DAILY, Disp: 180 tablet, Rfl: 3  •  ondansetron (ZOFRAN) 8 MG tablet, Take 1 tablet by mouth 3 (Three) Times a Day As Needed for Nausea or  "Vomiting., Disp: 30 tablet, Rfl: 6  •  OneTouch Delica Lancets 33G misc, 1 application 3 (Three) Times a Day. Check blood sugar three times daily, E11.9, Disp: 100 each, Rfl: 11  •  pantoprazole (PROTONIX) 40 MG EC tablet, TAKE 1 TABLET BY MOUTH  DAILY, Disp: 90 tablet, Rfl: 3  •  sulfamethoxazole-trimethoprim (BACTRIM DS,SEPTRA DS) 800-160 MG per tablet, Take 1 tablet by mouth 3 (Three) Times a Week. Take 1 tablet on Mondays, Wednesdays and Fridays., Disp: 12 tablet, Rfl: 11  •  tiZANidine (ZANAFLEX) 4 MG tablet, Take 4 mg by mouth 3 (Three) Times a Day As Needed for Muscle Spasms., Disp: , Rfl:   •  warfarin (COUMADIN) 4 MG tablet, Take 1 tablet by mouth Daily. Resume 10/1/18. 4mg daily but Take 7.5 tablet usually on Friday one time dose, Disp: 90 tablet, Rfl: 1    Pain Medications             aspirin 81 MG EC tablet Take 81 mg by mouth Daily.    dexamethasone (DECADRON) 4 MG tablet Take 20 mg, 5 tablets with food on Days 1, 2,  8, 9, 15,16 - Take with Food    gabapentin (NEURONTIN) 800 MG tablet Take 800 mg by mouth 3 (Three) Times a Day.    methadone (DOLOPHINE) 10 MG tablet Take 30 mg by mouth Every 6 (Six) Hours As Needed for Moderate Pain ,    tiZANidine (ZANAFLEX) 4 MG tablet Take 4 mg by mouth 3 (Three) Times a Day As Needed for Muscle Spasms.             ALLERGIES:  No Known Allergies      Physical Exam    VITAL SIGNS:  /88   Pulse 69   Temp 97.3 °F (36.3 °C) (Temporal)   Resp 16   Ht 175.3 cm (69\")   Wt 85.7 kg (189 lb)   SpO2 98%   BMI 27.91 kg/m²     Wt Readings from Last 3 Encounters:   10/16/20 85.7 kg (189 lb)   10/12/20 86 kg (189 lb 8 oz)   10/09/20 88.5 kg (195 lb)       Body mass index is 27.91 kg/m². Body surface area is 2.02 meters squared.    Pain Score    10/16/20 0838   PainSc: 0-No pain         Performance Status: 0    General: well appearing, in no acute distress  HEENT: sclera anicteric, neck is supple  Lymphatics: no cervical, supraclavicular, or axillary " adenopathy  Cardiovascular: regular rate and rhythm, no murmurs, rubs or gallops  Lungs: clear to auscultation bilaterally  Abdomen: soft, nontender, nondistended.  No palpable organomegaly  Extremities: no lower extremity edema  Skin: no rashes, lesions, bruising, or petechiae  Msk:  Shows no weakness of the large muscle groups  Psych: Mood is stable    Lab Results   Component Value Date    FINALDX  08/13/2020      PERIPHERAL BLOOD SMEAR, BONE MARROW ASPIRATE, CLOT SECTION AND BIOPSY WITH FLOW CYTOMETRY, IMMUNOHISTOCHEMISTRY AND IN-SITU HYBRIDIZATION STUDIES:  Pancytopenia.  Involved by CD56 positive lambda clonal plasma cell neoplasm accounting for approximately 50% of marrow cellularity.  Mild reticulin fibrosis identified (MF-1).  No amyloid deposition identified on congo red stain with appropriate control.      PCC/dlb       ]      RECENT LABS:    Lab Results   Component Value Date    HGB 8.0 (L) 10/09/2020    HCT 24.1 (L) 10/09/2020    MCV 91.3 10/09/2020    PLT 66 (L) 10/09/2020    WBC 4.21 10/09/2020    NEUTROABS 3.29 10/09/2020    LYMPHSABS 0.52 (L) 10/09/2020    MONOSABS 0.36 10/09/2020    EOSABS 0.01 10/09/2020    BASOSABS 0.00 10/09/2020       Lab Results   Component Value Date    GLUCOSE 192 (H) 10/09/2020    BUN 36 (H) 10/09/2020    CREATININE 1.33 (H) 10/09/2020     (L) 10/09/2020    K 4.5 10/09/2020    CL 96 (L) 10/09/2020    CO2 20.8 (L) 10/09/2020    CALCIUM 8.7 10/09/2020    PROTEINTOT 6.5 10/09/2020    ALBUMIN 3.2 10/09/2020    ALBUMIN 3.90 10/09/2020    BILITOT 0.5 10/09/2020    ALKPHOS 71 10/09/2020    AST 22 10/09/2020    ALT 23 10/09/2020       Lab Results   Component Value Date    MSPIKE 0.5 10/09/2020    MSPIKE 2.0 09/18/2020    MSPIKE 6.0 (H) 08/21/2020     Lab Results   Component Value Date    FREEKAPPAL 14.7 10/09/2020    FREEKAPPAL 9.7 09/18/2020    FREEKAPPAL 7.9 08/21/2020     Lab Results   Component [facility] Date    [facility]    [facility] 16.1 09/18/2020    [facility] 1173.7  (H) 08/21/2020     Lab Results   Component Value Date    PLT 66 (L) 10/09/2020    PLT 28 (C) 10/02/2020    PLT 64 (L) 09/25/2020    PLT 77 (L) 09/18/2020    PLT 19 (C) 09/11/2020             Assessment/Plan    1.  Multiple myeloma with renal dysfunction and pancytopenia.  Continue cycle #3 induction chemotherapy.  My plan is to complete 4 cycles of treatment prior to stem cell mobilization.  I am going to start the process for transplant evaluation.  I am going to send him to Dr. Mendoza for that.  He is having a very nice response to treatment.  His M spike has gone from 6 g/dL to 0.5 g/dL.    2.  Pancytopenia.  We will have to transfuse him to maintain his platelets over 50.    3.  Renal dysfunction secondary to myeloma.  His creatinine seems to be improving.    4.  History of multiple DVTs.  Patient on Coumadin        Merry Pelletier MD  [facility]    Return on: 11/06/20    Orders Placed This Encounter   Procedures   • DORA, PE & Free LT Chains, Ser   • Comprehensive metabolic panel   • Ambulatory Referral to Hematology   • CBC and Differential   • CBC and Differential       10/16/2020

## 2020-10-20 NOTE — PROGRESS NOTES
Oral Chemotherapy Teaching      Patient Name/:  Gene Bond   1955  Oral Chemotherapy Regimen:  Lenalidomide 10mg PO once daily x 14 days, followed by 7 days off (reduced starting dose d/t renal dysfunction) + Daratumumab on Days 1,8, and 15 + Velcade SQ on Days 1,4,8, and 11 every 21 days + Dexamethasone - Plan for 4 cycles per MD  Date Started Medication:   Cycle 1:   Cycle 2: 2020  Cycle 3: 10/9/2020  Cycle 4: anticipate 10/30/2020    Additional Notes:  Received refill request from [facility] for Revlimid. Plan for Cycle 4 on 10/30/2020. Reviewed oncology office visit note dated 10/16/2020. Released script for Revlimid 10mg PO once daily x 14 days, followed by 7 days off #14 with 0 RF to [facility] specialty pharmacy for continuation of treatment. This will be the final cycle prior to transplant at .

## 2020-10-23 NOTE — TELEPHONE ENCOUNTER
----- Message from Maria L Perez RN sent at 10/23/2020  9:13 AM EDT -----      Critical Test Results      MD: Federico    Date: 10/23/2020     Critical test result: PLT 45    Time results received:  9:14

## 2020-11-06 NOTE — PROGRESS NOTES
PROBLEM LIST:  Oncology/Hematology History   Multiple myeloma not having achieved remission (CMS/HCC)   8/18/2020 Initial Diagnosis    Multiple myeloma not having achieved remission (CMS/HCC)     8/20/2020 Cancer Staged    Staging form: Plasma Cell Myeloma And Plasma Cell Disorders, AJCC 8th Edition  - Clinical stage from 8/20/2020: RISS Stage III (Beta-2-microglobulin (mg/L): 16, Albumin (g/dL): 2.7, ISS: Stage III, High-risk cytogenetics: Present, LDH: Normal) - Signed by Merry Pelletier MD on 8/24/2020 8/27/2020 -  Chemotherapy    OP MULTIPLE MYELOMA Daratumumab / Bortezomib / Revlimid/Dexamethasone         REASON FOR VISIT: Newly diagnosed Myeloma    HISTORY OF PRESENT ILLNESS:   65 y.o.  male presents today for follow-up after undergoing induction chemotherapy with daratumumab, Velcade, Revlimid and dexamethasone.  He is in the midst of his 4th and final cycle of treatment. At times he has required platelet support because of being on Coumadin I want to maintain his platelets above 50,000.  He has occasional nosebleeds.  But he is tolerating this reasonably well.  He did have some fatigue.  No recent infections.  No nausea vomiting.  No significant neuropathy.    Past medical history, social history and family history was reviewed and unchanged from prior visit.    Review of Systems:    Review of Systems   Constitutional: Positive for fatigue.   HENT:  Negative.    Eyes: Negative.    Respiratory: Negative.    Cardiovascular: Negative.    Gastrointestinal: Negative.    Endocrine: Negative.    Genitourinary: Negative.     Musculoskeletal: Negative.    Skin: Negative.    Neurological: Negative.    Hematological: Negative.    Psychiatric/Behavioral: Negative.             Medications:        Current Outpatient Medications:   •  acyclovir (ZOVIRAX) 400 MG tablet, Take 1 tablet by mouth 2 (Two) Times a Day. Take one tablet by mouth twice daily., Disp: 60 tablet, Rfl: 11  •  aspirin 81 MG EC tablet, Take 81  mg by mouth Daily., Disp: , Rfl:   •  atorvastatin (LIPITOR) 80 MG tablet, TAKE 1 TABLET BY MOUTH  EVERY NIGHT, Disp: 90 tablet, Rfl: 3  •  dexamethasone (DECADRON) 4 MG tablet, Take 20 mg, 5 tablets with food on Days 1, 2,  8, 9, 15,16 - Take with Food, Disp: 60 tablet, Rfl: 6  •  fluocinolone (SYNALAR) 0.01 % external solution, Apply  topically to the appropriate area as directed 2 (Two) Times a Day., Disp: 60 mL, Rfl: 11  •  gabapentin (NEURONTIN) 800 MG tablet, Take 800 mg by mouth 3 (Three) Times a Day., Disp: , Rfl:   •  glimepiride (AMARYL) 2 MG tablet, Take 1 tablet by mouth Daily., Disp: 90 tablet, Rfl: 1  •  HumuLIN R 100 UNIT/ML injection, USE ACCORDING TO SLIDING SCALE AT DISCHARGE, Disp: , Rfl:   •  Insulin Glargine (BASAGLAR KWIKPEN) 100 UNIT/ML injection pen, Inject 20 Units under the skin into the appropriate area as directed Daily., Disp: 3 pen, Rfl: 5  •  Insulin Pen Needle 31G X 6 MM misc, 1 stick Daily., Disp: 90 each, Rfl: 0  •  lenalidomide (REVLIMID) 10 MG capsule, Take 1 capsule by mouth Daily. on Days 1-14, and off 7 days. Adult Male REMS: [id number], Disp: 14 capsule, Rfl: 0  •  lenalidomide (REVLIMID) 10 MG capsule, Take 1 capsule by mouth Daily. on Days 1-14, and off 7 days. Adult Male REMS: [id number], Disp: 14 capsule, Rfl: 0  •  lisinopril (PRINIVIL,ZESTRIL) 10 MG tablet, Take 1 tablet by mouth Daily., Disp: 90 tablet, Rfl: 3  •  metFORMIN (GLUCOPHAGE) 1000 MG tablet, TAKE 1 TABLET BY MOUTH TWO  TIMES DAILY WITH MEALS, Disp: 180 tablet, Rfl: 3  •  methadone (DOLOPHINE) 10 MG tablet, Take 30 mg by mouth Every 6 (Six) Hours As Needed for Moderate Pain ,, Disp: , Rfl:   •  metoprolol tartrate (LOPRESSOR) 25 MG tablet, TAKE 1 TABLET BY MOUTH TWO  TIMES DAILY, Disp: 180 tablet, Rfl: 3  •  ondansetron (ZOFRAN) 8 MG tablet, Take 1 tablet by mouth 3 (Three) Times a Day As Needed for Nausea or Vomiting., Disp: 30 tablet, Rfl: 6  •  OneTouch Delica Lancets 33G misc, 1 application 3 (Three) Times a  "Day. Check blood sugar three times daily, E11.9, Disp: 100 each, Rfl: 11  •  pantoprazole (PROTONIX) 40 MG EC tablet, TAKE 1 TABLET BY MOUTH  DAILY, Disp: 90 tablet, Rfl: 3  •  sulfamethoxazole-trimethoprim (BACTRIM DS,SEPTRA DS) 800-160 MG per tablet, Take 1 tablet by mouth 3 (Three) Times a Week. Take 1 tablet on Mondays, Wednesdays and Fridays., Disp: 12 tablet, Rfl: 11  •  tiZANidine (ZANAFLEX) 4 MG tablet, Take 4 mg by mouth 3 (Three) Times a Day As Needed for Muscle Spasms., Disp: , Rfl:   •  warfarin (COUMADIN) 4 MG tablet, Take 1 tablet by mouth Daily. Resume 10/1/18. 4mg daily but Take 7.5 tablet usually on Friday one time dose, Disp: 90 tablet, Rfl: 1    Pain Medications             aspirin 81 MG EC tablet Take 81 mg by mouth Daily.    dexamethasone (DECADRON) 4 MG tablet Take 20 mg, 5 tablets with food on Days 1, 2,  8, 9, 15,16 - Take with Food    gabapentin (NEURONTIN) 800 MG tablet Take 800 mg by mouth 3 (Three) Times a Day.    methadone (DOLOPHINE) 10 MG tablet Take 30 mg by mouth Every 6 (Six) Hours As Needed for Moderate Pain ,    tiZANidine (ZANAFLEX) 4 MG tablet Take 4 mg by mouth 3 (Three) Times a Day As Needed for Muscle Spasms.             ALLERGIES:  No Known Allergies      Physical Exam    VITAL SIGNS:  /62   Pulse 65   Temp 97.1 °F (36.2 °C) (Temporal)   Resp 20   Ht 175.3 cm (69\")   Wt 86.6 kg (191 lb)   SpO2 98%   BMI 28.21 kg/m²     Wt Readings from Last 3 Encounters:   11/06/20 86.6 kg (191 lb)   11/02/20 86.9 kg (191 lb 8 oz)   10/30/20 86.2 kg (190 lb)       Body mass index is 28.21 kg/m². Body surface area is 2.03 meters squared.    Pain Score    11/06/20 1330   PainSc: 0-No pain         Performance Status: 0    General: well appearing, in no acute distress  HEENT: sclera anicteric, neck is supple  Lymphatics: no cervical, supraclavicular, or axillary adenopathy  Cardiovascular: regular rate and rhythm, no murmurs, rubs or gallops  Lungs: clear to auscultation " bilaterally  Abdomen: soft, nontender, nondistended.  No palpable organomegaly  Extremities: no lower extremity edema  Skin: no rashes, lesions, bruising, or petechiae  Msk:  Shows no weakness of the large muscle groups  Psych: Mood is stable    Lab Results   Component Value Date    FINALDX  08/13/2020      PERIPHERAL BLOOD SMEAR, BONE MARROW ASPIRATE, CLOT SECTION AND BIOPSY WITH FLOW CYTOMETRY, IMMUNOHISTOCHEMISTRY AND IN-SITU HYBRIDIZATION STUDIES:  Pancytopenia.  Involved by CD56 positive lambda clonal plasma cell neoplasm accounting for approximately 50% of marrow cellularity.  Mild reticulin fibrosis identified (MF-1).  No amyloid deposition identified on congo red stain with appropriate control.      PCC/dlb       ]      RECENT LABS:    Lab Results   Component Value Date    HGB 8.0 (L) 10/30/2020    HCT 24.9 (L) 10/30/2020    MCV 90.5 10/30/2020     10/30/2020    WBC 3.51 10/30/2020    NEUTROABS 2.75 10/30/2020    LYMPHSABS 0.49 (L) 10/30/2020    MONOSABS 0.21 10/30/2020    EOSABS 0.01 10/30/2020    BASOSABS 0.00 10/30/2020       Lab Results   Component Value Date    GLUCOSE 242 (H) 10/30/2020    BUN 32 (H) 10/30/2020    CREATININE 1.46 (H) 10/30/2020     (L) 10/30/2020    K 4.7 10/30/2020    CL 99 10/30/2020    CO2 22.4 10/30/2020    CALCIUM 8.8 10/30/2020    PROTEINTOT 6.4 10/30/2020    ALBUMIN 3.1 10/30/2020    ALBUMIN 3.90 10/30/2020    BILITOT 0.4 10/30/2020    ALKPHOS 106 10/30/2020    AST 15 10/30/2020    ALT 19 10/30/2020       Lab Results   Component Value Date    MSPIKE NOT QUANTIFIABLE 10/30/2020    MSPIKE 0.5 10/09/2020    MSPIKE 2.0 09/18/2020    MSPIKE 6.0 (H) 08/21/2020     Lab Results   Component Value Date    FREEKAPPAL 12.6 10/30/2020    FREEKAPPAL 14.7 10/09/2020    FREEKAPPAL 9.7 09/18/2020    FREEKAPPAL 7.9 08/21/2020     Lab Results   Component Value Date    IGLFLC 13.1 10/30/2020    IGLFLC 13.2 10/09/2020    IGLFLC 16.1 09/18/2020    Lambda Light Chain 1173 08/21/2020      Lab Results   Component Value Date     10/30/2020    PLT 45 (C) 10/23/2020    PLT 71 (L) 10/16/2020    PLT 66 (L) 10/09/2020    PLT 28 (C) 10/02/2020             Assessment/Plan    1.  Multiple myeloma with renal dysfunction and pancytopenia.  Continue cycle #4 induction chemotherapy.  My plan is to complete 4 cycles of treatment prior to stem cell mobilization.  I am going to start the process for transplant evaluation.  I am going to send him to Dr. Mendoza for that.  He is having a very nice response to treatment.  His M spike has gone from 6 g/dL to too small to quantify.    2.  Pancytopenia.  We will have to transfuse him to maintain his platelets over 50. Has been doing well for now.    3.  Renal dysfunction secondary to myeloma.  His creatinine seems to be improving.    4.  History of multiple DVTs.  Patient on Coumadin    Highly complex patient with discussion regarding need for transplant and his response to his treatment for his myeloma.    Merry Pelletier MD  [facility]    Return on: 12/04/20    Orders Placed This Encounter   Procedures   • Ambulatory Referral to Hematology / Oncology       11/6/2020

## 2020-11-10 NOTE — TELEPHONE ENCOUNTER
Returned call to patient. At this time informing patient nurse will discuss with Dr. Caballero in am and then call him with what Dr. Caballero want to do. Patient stating he understands.

## 2020-11-11 NOTE — TELEPHONE ENCOUNTER
Returned call to patient. At discussing with Dr Caballero. Informing patient that Dr. Caballero was fine with patient seeing one of Dr. Arndt partners.

## 2020-11-19 NOTE — PROGRESS NOTES
Subjective   Gene Bond is a 65 y.o. male    Chief Complaint    Diabetes mellitus  Recurrent DVT  Multiple myeloma      History of Present Illness  Patient presents today to this office for follow-up regarding type 2 diabetes mellitus on insulin.  Also follow-up regarding recurrent DVT and chronic anticoagulation therapy.  Patient is ongoing chemotherapy for multiple myeloma and is tolerating his therapy well.  Multiple clinical notes are reviewed regarding his myeloma therapy.  Patient reports that he feels well.  He occasionally has some generalized malaise and fatigue but feels he is tolerating his treatment.  Blood glucose levels have been acceptable.  Again continues on his anticoagulant therapy for previous recurrent DVT.  His INR levels have been therapeutic for the most part.    The following portions of the patient's history were reviewed and updated as appropriate: allergies, current medications, past social history and problem list    Review of Systems   Constitutional: Negative for appetite change, diaphoresis, fatigue and unexpected weight change.   HENT: Negative.    Eyes: Negative for visual disturbance.   Respiratory: Negative for chest tightness and shortness of breath.    Cardiovascular: Negative for chest pain, palpitations and leg swelling.   Gastrointestinal: Negative for diarrhea, nausea and vomiting.   Endocrine: Negative for polydipsia, polyphagia and polyuria.   Genitourinary: Negative.    Musculoskeletal: Positive for arthralgias, back pain and myalgias.   Skin: Negative for color change, rash and wound.   Neurological: Negative for dizziness, weakness, light-headedness and numbness.   Hematological: Negative.    Psychiatric/Behavioral: Negative.        Objective     Vitals:    11/16/20 1137   BP: 112/60   Pulse: 68   Temp: 97.1 °F (36.2 °C)   SpO2: 99%       Physical Exam  Vitals signs and nursing note reviewed.   Constitutional:       Appearance: Normal appearance. He is  well-developed and normal weight. He is not diaphoretic.   HENT:      Head: Normocephalic and atraumatic.   Eyes:      General: No scleral icterus.     Conjunctiva/sclera: Conjunctivae normal.      Pupils: Pupils are equal, round, and reactive to light.   Neck:      Musculoskeletal: Neck supple.      Thyroid: No thyromegaly.      Vascular: No JVD.   Cardiovascular:      Rate and Rhythm: Normal rate and regular rhythm.      Pulses: Normal pulses.      Heart sounds: Normal heart sounds. No murmur.   Pulmonary:      Effort: Pulmonary effort is normal. No respiratory distress.      Breath sounds: Normal breath sounds.   Abdominal:      General: Bowel sounds are normal.      Palpations: Abdomen is soft.      Tenderness: There is no abdominal tenderness.   Lymphadenopathy:      Cervical: No cervical adenopathy.   Skin:     General: Skin is warm and dry.   Neurological:      General: No focal deficit present.      Mental Status: He is alert and oriented to person, place, and time.      Sensory: No sensory deficit.   Psychiatric:         Mood and Affect: Mood normal.         Behavior: Behavior normal.         Assessment/Plan   Problems Addressed this Visit        Cardiovascular and Mediastinum    HTN (hypertension)       Endocrine    DM (diabetes mellitus) (CMS/[facility]) - Primary    Relevant Medications    glimepiride (AMARYL) 2 MG tablet       Hematopoietic and Hemostatic    Multiple myeloma not having achieved remission (CMS/[facility])       Other    History of DVT (deep vein thrombosis)      Diagnoses       Codes Comments    Type 2 diabetes mellitus without complication, without long-term current use of insulin (CMS/[facility])    -  Primary ICD-10-CM: E11.9  ICD-9-CM: 250.00     Essential hypertension     ICD-10-CM: I10  ICD-9-CM: 401.9     History of DVT (deep vein thrombosis)     ICD-10-CM: Z86.718  ICD-9-CM: V12.51     Multiple myeloma not having achieved remission (CMS/HCC)     ICD-10-CM: C90.00  ICD-9-CM: 203.00       Continue  other medications without changes.    Continue chemo for myeloma followed by oncology.  Continue physical activity as tolerated.

## 2020-12-04 NOTE — PROGRESS NOTES
PROBLEM LIST:  Oncology/Hematology History   Multiple myeloma not having achieved remission (CMS/HCC)   8/18/2020 Initial Diagnosis    Multiple myeloma not having achieved remission (CMS/HCC)     8/20/2020 Cancer Staged    Staging form: Plasma Cell Myeloma And Plasma Cell Disorders, AJCC 8th Edition  - Clinical stage from 8/20/2020: RISS Stage III (Beta-2-microglobulin (mg/L): 16, Albumin (g/dL): 2.7, ISS: Stage III, High-risk cytogenetics: Present, LDH: Normal) - Signed by Merry Pelletier MD on 8/24/2020 8/27/2020 -  Chemotherapy    OP MULTIPLE MYELOMA Daratumumab / Bortezomib / Revlimid/Dexamethasone         REASON FOR VISIT: Newly diagnosed Myeloma    HISTORY OF PRESENT ILLNESS:   65 y.o.  male presents today for follow-up.  He is undergoing evaluation for transplant.  He had his bone marrow biopsy couple of days ago at .  He will start his growth factor support next week and.  Subsequent week he will have a pheresis stem cell collection.  The plan is to admit him on 30 December with plan for transplantation after that.  He will have melphalan as an outpatient prior to that.  He is actually doing reasonably well.  He has no major issues ongoing.  Fatigue seems to be better.    Past medical history, social history and family history was reviewed and unchanged from prior visit.    Review of Systems:    Review of Systems   Constitutional: Positive for fatigue.   HENT:  Negative.    Eyes: Negative.    Respiratory: Negative.    Cardiovascular: Negative.    Gastrointestinal: Negative.    Endocrine: Negative.    Genitourinary: Negative.     Musculoskeletal: Negative.    Skin: Negative.    Neurological: Negative.    Hematological: Negative.    Psychiatric/Behavioral: Negative.             Medications:        Current Outpatient Medications:   •  acyclovir (ZOVIRAX) 400 MG tablet, Take 1 tablet by mouth 2 (Two) Times a Day. Take one tablet by mouth twice daily., Disp: 60 tablet, Rfl: 11  •  aspirin 81 MG EC  tablet, Take 81 mg by mouth Daily., Disp: , Rfl:   •  atorvastatin (LIPITOR) 80 MG tablet, TAKE 1 TABLET BY MOUTH  EVERY NIGHT, Disp: 90 tablet, Rfl: 3  •  dexamethasone (DECADRON) 4 MG tablet, TAKE 5 TABLETS BY MOUTH ON  DAYS 1, 2, 8, 9, 15, 16  TAKE WITH FOOD, Disp: 60 tablet, Rfl: 10  •  fluocinolone (SYNALAR) 0.01 % external solution, Apply  topically to the appropriate area as directed 2 (Two) Times a Day., Disp: 60 mL, Rfl: 11  •  gabapentin (NEURONTIN) 800 MG tablet, Take 800 mg by mouth 3 (Three) Times a Day., Disp: , Rfl:   •  glimepiride (AMARYL) 2 MG tablet, Take 1 tablet by mouth Daily., Disp: 90 tablet, Rfl: 1  •  HumuLIN R 100 UNIT/ML injection, USE ACCORDING TO SLIDING SCALE AT DISCHARGE, Disp: , Rfl:   •  Insulin Glargine (BASAGLAR KWIKPEN) 100 UNIT/ML injection pen, Inject 20 Units under the skin into the appropriate area as directed Daily., Disp: 3 pen, Rfl: 5  •  Insulin Pen Needle 31G X 6 MM misc, 1 stick Daily., Disp: 90 each, Rfl: 0  •  lenalidomide (REVLIMID) 10 MG capsule, Take 1 capsule by mouth Daily. on Days 1-14, and off 7 days. Adult Male REMS: [id number], Disp: 14 capsule, Rfl: 0  •  lisinopril (PRINIVIL,ZESTRIL) 10 MG tablet, Take 1 tablet by mouth Daily., Disp: 90 tablet, Rfl: 3  •  metFORMIN (GLUCOPHAGE) 1000 MG tablet, TAKE 1 TABLET BY MOUTH TWO  TIMES DAILY WITH MEALS, Disp: 180 tablet, Rfl: 3  •  methadone (DOLOPHINE) 10 MG tablet, Take 30 mg by mouth Every 6 (Six) Hours As Needed for Moderate Pain ,, Disp: , Rfl:   •  metoprolol tartrate (LOPRESSOR) 25 MG tablet, TAKE 1 TABLET BY MOUTH TWO  TIMES DAILY, Disp: 180 tablet, Rfl: 3  •  ondansetron (ZOFRAN) 8 MG tablet, Take 1 tablet by mouth 3 (Three) Times a Day As Needed for Nausea or Vomiting., Disp: 30 tablet, Rfl: 6  •  OneTouch Delica Lancets 33G misc, 1 application 3 (Three) Times a Day. Check blood sugar three times daily, E11.9, Disp: 100 each, Rfl: 11  •  pantoprazole (PROTONIX) 40 MG EC tablet, TAKE 1 TABLET BY MOUTH  DAILY,  "Disp: 90 tablet, Rfl: 3  •  sulfamethoxazole-trimethoprim (BACTRIM DS,SEPTRA DS) 800-160 MG per tablet, Take 1 tablet by mouth 3 (Three) Times a Week. Take 1 tablet on Mondays, Wednesdays and Fridays., Disp: 12 tablet, Rfl: 11  •  tiZANidine (ZANAFLEX) 4 MG tablet, Take 4 mg by mouth 3 (Three) Times a Day As Needed for Muscle Spasms., Disp: , Rfl:   •  warfarin (COUMADIN) 4 MG tablet, Take 1 tablet by mouth Daily. Resume 10/1/18. 4mg daily but Take 7.5 tablet usually on Friday one time dose, Disp: 90 tablet, Rfl: 1    Pain Medications             aspirin 81 MG EC tablet Take 81 mg by mouth Daily.    dexamethasone (DECADRON) 4 MG tablet TAKE 5 TABLETS BY MOUTH ON  DAYS 1, 2, 8, 9, 15, 16  TAKE WITH FOOD    gabapentin (NEURONTIN) 800 MG tablet Take 800 mg by mouth 3 (Three) Times a Day.    methadone (DOLOPHINE) 10 MG tablet Take 30 mg by mouth Every 6 (Six) Hours As Needed for Moderate Pain ,    tiZANidine (ZANAFLEX) 4 MG tablet Take 4 mg by mouth 3 (Three) Times a Day As Needed for Muscle Spasms.             ALLERGIES:  No Known Allergies      Physical Exam    VITAL SIGNS:  /70   Pulse 69   Temp 97.1 °F (36.2 °C) (Temporal)   Resp 16   Ht 175.3 cm (69\")   Wt 87.5 kg (193 lb)   SpO2 99%   BMI 28.50 kg/m²     Wt Readings from Last 3 Encounters:   12/04/20 87.5 kg (193 lb)   11/16/20 88.9 kg (196 lb)   11/09/20 85 kg (187 lb 8 oz)       Body mass index is 28.5 kg/m². Body surface area is 2.03 meters squared.    Pain Score    12/04/20 1511   PainSc: 0-No pain         Performance Status: 0    General: well appearing, in no acute distress  HEENT: sclera anicteric, neck is supple  Lymphatics: no cervical, supraclavicular, or axillary adenopathy  Cardiovascular: regular rate and rhythm, no murmurs, rubs or gallops  Lungs: clear to auscultation bilaterally  Abdomen: soft, nontender, nondistended.  No palpable organomegaly  Extremities: no lower extremity edema  Skin: no rashes, lesions, bruising, or " petechiae  Msk:  Shows no weakness of the large muscle groups  Psych: Mood is stable    Lab Results   Component Value Date    FINALDX  08/13/2020      PERIPHERAL BLOOD SMEAR, BONE MARROW ASPIRATE, CLOT SECTION AND BIOPSY WITH FLOW CYTOMETRY, IMMUNOHISTOCHEMISTRY AND IN-SITU HYBRIDIZATION STUDIES:  Pancytopenia.  Involved by CD56 positive lambda clonal plasma cell neoplasm accounting for approximately 50% of marrow cellularity.  Mild reticulin fibrosis identified (MF-1).  No amyloid deposition identified on congo red stain with appropriate control.      PCC/dlb             RECENT LABS:    Lab Results   Component Value Date    HGB 8.5 (L) 11/06/2020    HCT 26.2 (L) 11/06/2020    MCV 90.0 11/06/2020     (L) 11/06/2020    WBC 3.35 (L) 11/06/2020    NEUTROABS 2.34 11/06/2020    LYMPHSABS 0.68 (L) 11/06/2020    MONOSABS 0.28 11/06/2020    EOSABS 0.00 11/06/2020    BASOSABS 0.01 11/06/2020       Lab Results   Component Value Date    GLUCOSE 242 (H) 10/30/2020    BUN 32 (H) 10/30/2020    CREATININE 1.46 (H) 10/30/2020     (L) 10/30/2020    K 4.7 10/30/2020    CL 99 10/30/2020    CO2 22.4 10/30/2020    CALCIUM 8.8 10/30/2020    PROTEINTOT 6.4 10/30/2020    ALBUMIN 3.1 10/30/2020    ALBUMIN 3.90 10/30/2020    BILITOT 0.4 10/30/2020    ALKPHOS 106 10/30/2020    AST 15 10/30/2020    ALT 19 10/30/2020       Lab Results   Component Value Date    MSPIKE NOT QUANTIFIABLE 10/30/2020    MSPIKE 0.5 10/09/2020    MSPIKE 2.0 09/18/2020    MSPIKE 6.0 (H) 08/21/2020     Lab Results   Component Value Date    FREEKAPPAL 12.6 10/30/2020    FREEKAPPAL 14.7 10/09/2020    FREEKAPPAL 9.7 09/18/2020    FREEKAPPAL 7.9 08/21/2020     Lab Results   Component Value Date    IGLFLC 13.1 10/30/2020    IGLFLC 13.2 10/09/2020    IGLFLC 16.1 09/18/2020    Lambda Light Chain 1173 08/21/2020     Lab Results   Component Value Date     (L) 11/06/2020     10/30/2020    PLT 45 (C) 10/23/2020    PLT 71 (L) 10/16/2020    PLT 66 (L)  10/09/2020             Assessment/Plan    1.  Multiple myeloma with renal dysfunction and pancytopenia.  Completed 4 cycles of  D- RVD which was completed on November 9, 2020.  He will undergo transplantation January 1 of 2021.  Usually they will send him back for follow-up about 100 days post transplant.  We will initiate appropriate immunization schedule and likely will place him on Revlimid maintenance at that time.      3.  Renal dysfunction secondary to myeloma.  Stable.    4.  History of multiple DVTs.  Patient on Coumadin    Moderately complex patient with discussion regarding need for transplant and his response to his treatment for his myeloma.    Merry Pelletier MD  [facility] Hematology and Oncology         No orders of the defined types were placed in this encounter.      12/4/2020

## 2020-12-17 NOTE — TELEPHONE ENCOUNTER
Patient's wife Nunu requested a prescription for One Touch Ultra glucose test strips. Nunu stated he tests 3 times daily Monday through Thursday and tests 5 times daily Friday through Sunday as he takes steroids as a cancer treatment. Patient is out of test strips.    [facility] [address] [place] [address] [phone] [facility] [phone] FX      Please call and advise. Nunu's call back [phone]

## 2020-12-18 NOTE — PROGRESS NOTES
PROBLEM LIST:  Oncology/Hematology History   Multiple myeloma not having achieved remission (CMS/HCC)   8/18/2020 Initial Diagnosis    Multiple myeloma not having achieved remission (CMS/HCC)     8/20/2020 Cancer Staged    Staging form: Plasma Cell Myeloma And Plasma Cell Disorders, AJCC 8th Edition  - Clinical stage from 8/20/2020: RISS Stage III (Beta-2-microglobulin (mg/L): 16, Albumin (g/dL): 2.7, ISS: Stage III, High-risk cytogenetics: Present, LDH: Normal) - Signed by Merry Pelletier MD on 8/24/2020 8/27/2020 - 1/7/2021 Chemotherapy    OP MULTIPLE MYELOMA Daratumumab / Bortezomib / Revlimid/Dexamethasone     1/8/2021 -  Chemotherapy    OP MULTIPLE MYELOMA Daratumumab / Lenalidomide / Dexamethasone     1/8/2021 -  Chemotherapy    OP SUPPORTIVE Denosumab (Xgeva) Q28D         REASON FOR VISIT: Newly diagnosed Myeloma    HISTORY OF PRESENT ILLNESS:   65 y.o.  male presents today for follow-up.  He underwent induction chemotherapy with Darzalek Velcade Revlimid and dexamethasone.  He completed 4 cycles with a very nice response.  He underwent a transplant evaluation.  Unfortunately due to suppressed ejection fraction of 40% he was deemed not a candidate for transplant.  He presents today to discuss treatment going forward.  He still has some back pain issues.  Still has some fatigue issues.    Past medical history, social history and family history was reviewed and unchanged from prior visit.    Review of Systems:    Review of Systems   Constitutional: Positive for fatigue.   HENT:  Negative.    Eyes: Negative.    Respiratory: Negative.    Cardiovascular: Negative.    Gastrointestinal: Negative.    Endocrine: Negative.    Genitourinary: Negative.     Musculoskeletal: Negative.    Skin: Negative.    Neurological: Negative.    Hematological: Negative.    Psychiatric/Behavioral: Negative.             Medications:        Current Outpatient Medications:   •  aspirin 81 MG EC tablet, Take 81 mg by mouth Daily.,  Disp: , Rfl:   •  atorvastatin (LIPITOR) 80 MG tablet, TAKE 1 TABLET BY MOUTH  EVERY NIGHT, Disp: 90 tablet, Rfl: 3  •  fluocinolone (SYNALAR) 0.01 % external solution, Apply  topically to the appropriate area as directed 2 (Two) Times a Day., Disp: 60 mL, Rfl: 11  •  gabapentin (NEURONTIN) 800 MG tablet, Take 800 mg by mouth 3 (Three) Times a Day., Disp: , Rfl:   •  glimepiride (AMARYL) 2 MG tablet, Take 1 tablet by mouth Daily., Disp: 90 tablet, Rfl: 1  •  HumuLIN R 100 UNIT/ML injection, USE ACCORDING TO SLIDING SCALE AT DISCHARGE, Disp: , Rfl:   •  Insulin Glargine (BASAGLAR KWIKPEN) 100 UNIT/ML injection pen, Inject 20 Units under the skin into the appropriate area as directed Daily., Disp: 3 pen, Rfl: 5  •  Insulin Pen Needle 31G X 6 MM misc, 1 stick Daily., Disp: 90 each, Rfl: 0  •  lisinopril (PRINIVIL,ZESTRIL) 10 MG tablet, Take 1 tablet by mouth Daily., Disp: 90 tablet, Rfl: 3  •  metFORMIN (GLUCOPHAGE) 1000 MG tablet, TAKE 1 TABLET BY MOUTH TWO  TIMES DAILY WITH MEALS, Disp: 180 tablet, Rfl: 3  •  methadone (DOLOPHINE) 10 MG tablet, Take 30 mg by mouth Every 6 (Six) Hours As Needed for Moderate Pain ,, Disp: , Rfl:   •  metoprolol tartrate (LOPRESSOR) 25 MG tablet, TAKE 1 TABLET BY MOUTH TWO  TIMES DAILY, Disp: 180 tablet, Rfl: 3  •  OneTouch Delica Lancets 33G misc, 1 application 3 (Three) Times a Day. Check blood sugar three times daily, E11.9, Disp: 100 each, Rfl: 11  •  OneTouch Ultra test strip, USE 1 STRIP TO CHECK GLUCOSE TWICE DAILY, Disp: 100 each, Rfl: 11  •  pantoprazole (PROTONIX) 40 MG EC tablet, TAKE 1 TABLET BY MOUTH  DAILY, Disp: 90 tablet, Rfl: 3  •  tiZANidine (ZANAFLEX) 4 MG tablet, Take 4 mg by mouth 3 (Three) Times a Day As Needed for Muscle Spasms., Disp: , Rfl:   •  warfarin (COUMADIN) 4 MG tablet, Take 1 tablet by mouth Daily. Resume 10/1/18. 4mg daily but Take 7.5 tablet usually on Friday one time dose, Disp: 90 tablet, Rfl: 1    Pain Medications             aspirin 81 MG EC  "tablet Take 81 mg by mouth Daily.    gabapentin (NEURONTIN) 800 MG tablet Take 800 mg by mouth 3 (Three) Times a Day.    methadone (DOLOPHINE) 10 MG tablet Take 30 mg by mouth Every 6 (Six) Hours As Needed for Moderate Pain ,    tiZANidine (ZANAFLEX) 4 MG tablet Take 4 mg by mouth 3 (Three) Times a Day As Needed for Muscle Spasms.             ALLERGIES:  No Known Allergies      Physical Exam    VITAL SIGNS:  /68   Pulse 62   Temp 96.9 °F (36.1 °C) (Temporal)   Resp 16   Ht 175.3 cm (69\")   Wt 88.5 kg (195 lb)   SpO2 98%   BMI 28.80 kg/m²     Wt Readings from Last 3 Encounters:   12/18/20 88.5 kg (195 lb)   12/04/20 87.5 kg (193 lb)   11/16/20 88.9 kg (196 lb)       Body mass index is 28.8 kg/m². Body surface area is 2.04 meters squared.    Pain Score    12/18/20 1407   PainSc: 0-No pain         Performance Status: 0    General: well appearing, in no acute distress  HEENT: sclera anicteric, neck is supple  Lymphatics: no cervical, supraclavicular, or axillary adenopathy  Cardiovascular: regular rate and rhythm, no murmurs, rubs or gallops  Lungs: clear to auscultation bilaterally  Abdomen: soft, nontender, nondistended.  No palpable organomegaly  Extremities: no lower extremity edema  Skin: no rashes, lesions, bruising, or petechiae  Msk:  Shows no weakness of the large muscle groups  Psych: Mood is stable    Lab Results   Component Value Date    FINALDX  08/13/2020      PERIPHERAL BLOOD SMEAR, BONE MARROW ASPIRATE, CLOT SECTION AND BIOPSY WITH FLOW CYTOMETRY, IMMUNOHISTOCHEMISTRY AND IN-SITU HYBRIDIZATION STUDIES:  Pancytopenia.  Involved by CD56 positive lambda clonal plasma cell neoplasm accounting for approximately 50% of marrow cellularity.  Mild reticulin fibrosis identified (MF-1).  No amyloid deposition identified on congo red stain with appropriate control.      PCC/dlb             RECENT LABS:    Lab Results   Component Value Date    HGB 8.5 (L) 11/06/2020    HCT 26.2 (L) 11/06/2020    MCV 90.0 " 11/06/2020     (L) 11/06/2020    WBC 3.35 (L) 11/06/2020    NEUTROABS 2.34 11/06/2020    LYMPHSABS 0.68 (L) 11/06/2020    MONOSABS 0.28 11/06/2020    EOSABS 0.00 11/06/2020    BASOSABS 0.01 11/06/2020       Lab Results   Component Value Date    GLUCOSE 242 (H) 10/30/2020    BUN 32 (H) 10/30/2020    CREATININE 1.46 (H) 10/30/2020     (L) 10/30/2020    K 4.7 10/30/2020    CL 99 10/30/2020    CO2 22.4 10/30/2020    CALCIUM 8.8 10/30/2020    PROTEINTOT 6.4 10/30/2020    ALBUMIN 3.1 10/30/2020    ALBUMIN 3.90 10/30/2020    BILITOT 0.4 10/30/2020    ALKPHOS 106 10/30/2020    AST 15 10/30/2020    ALT 19 10/30/2020     Lab Results   Component Value Date    MSPIKE NOT QUANTIFIABLE 10/30/2020    MSPIKE 0.5 10/09/2020    MSPIKE 2.0 09/18/2020         MSPIKE         6.0              08/21/2020    Lab Results   Component Value Date    FREEKAPPAL 12.6 10/30/2020    FREEKAPPAL 14.7 10/09/2020    FREEKAPPAL 9.7 09/18/2020     Lab Results   Component Value Date    IGLFLC 13.1 10/30/2020    IGLFLC 13.2 10/09/2020    IGLFLC 16.1 09/18/2020         IGLFLC                                                         1173                                 08/21/2020        Assessment/Plan    1.  Multiple myeloma with renal dysfunction and pancytopenia.  I am going to restart his treatment.  I am going to treat him with Darzalek Revlimid and Dex.  I will hold the Velcade for now.  He will continue with this regimen as maintenance.  I am going to also place him on Xgeva monthly.  I will likely extended to every 3 months after completing 6 treatments.    3.  Renal dysfunction secondary to myeloma.  Stable.    4.  History of multiple DVTs.  Patient on Coumadin    5.  CAD with depressed EF of 40%    Moderately complex patient with discussion regarding his disease and treatment options going forward since he is not a Transplant candidate.    Merry Pelletier MD  [facility] Hematology and Oncology    Return on: 02/05/21    Orders  Placed This Encounter   Procedures   • Comprehensive metabolic panel   • DORA, PE & Free LT Chains, Ser   • Comprehensive metabolic panel   • Magnesium   • Phosphorus   • Comprehensive metabolic panel   • Magnesium   • Phosphorus   • CBC and Differential   • CBC and Differential   • CBC and Differential   • CBC and Differential       12/18/2020

## 2020-12-18 NOTE — TELEPHONE ENCOUNTER
PATIENTS WIFE CALLED TO CHECK THE STATUS OF THE TEST STRIPS.LUIS STATES THAT THE PATIENT IS ON STEROIDS AND CANCER TREATMENT AND HIS BLOOD SUGAR GOES UP AND NEEDS TO TEST HIS BLOOD SUGAR LEVELS.    PHARMACY:[facility] [address] [place] [address] [address] [place] [address] [phone] [facility] [phone]   [phone]     LUIS CALL BACK [phone]

## 2020-12-21 NOTE — TELEPHONE ENCOUNTER
Spoke with pharmacy; script is . Please send in a new script for his test strips and put in dx code.

## 2020-12-21 NOTE — TELEPHONE ENCOUNTER
"PATIENT'S WIFE LUIS CALLED TO REPORT THAT THE TEST STRIPS THAT HAVE BEEN CALLED IN FOR HER  IS MISSING A \"DIAGNOSIS CODE\". PATIENT NEEDS SOMEONE TO CONTACT [facility] [address] [place] [address], [place] [address] [facility] [phone] [facility] [phone]     WITH THAT INFORMATION SO SHE CAN PICK THE TEST STRIPS UP. PATIENT IS DOWN TO 2 TEST STRIPS THAT WILL BE GONE TODAY. PATIENT HAS BEEN TRYING TO GET THIS REFILL SINCE 12/17/2020. PLEASE ADVISE [phone].      "

## 2020-12-21 NOTE — PROGRESS NOTES
Patient is restarting treatment with lenalidomide per office note from 12/18/20. Released new script for lenalidomide 25mg PO daily Days 1-21 then 7 days off, #21 with 0 RF to [facility].

## 2020-12-28 NOTE — TELEPHONE ENCOUNTER
PTS WIFE STATES [facility] PH[facility] WILL NOT RELEASE TEST STRIPS WITHOUT AUTH CODE; PLEASE CALL PHARMACY ON FILE.

## 2020-12-28 NOTE — TELEPHONE ENCOUNTER
They are going to fax over a medicare review paper so we can fill this out.  Then fax to their review department.

## 2020-12-28 NOTE — PLAN OF CARE
[facility] • [address]  • [address] • [place] [address] • [phone]      CHEMOTHERAPY EDUCATION SHEET    NAME:  Gene Bond      : 1955           DATE: 20    Booklets Given: Chemotherapy and You []  Eating Hints []    Sexuality/Fertility Books []     Chemotherapy/Biotherapy Education Sheets: (list all that apply)  Darzelex and Xgeva                                                                                                                                                                Chemotherapy Regimen:  Darzelex 1800mg IV infusion on days 1, 8, 15, and 22 of each 28 day cycle; Xgeva 120mg SQ every 28 days.    TOPICS EDUCATION PROVIDED EDUCATION REINFORCED COMMENTS   ANEMIA:  role of RBC, cause, s/s, ways to manage, role of transfusion [x] [] Discussed the role of red blood cells in our body as well as the signs and symptoms of anemia, such as fatigue and weakness.   THROMBOCYTOPENIA:  role of platelet, cause, s/s, ways to prevent bleeding, things to avoid, when to seek help [x] [] Discussed the role of platelets in our body and the potential for decreased platelet counts with therapy which could lead to an increased risk of bleeding.   NEUTROPENIA:  role of WBC, cause, infection precautions, s/s of infection, when to call MD [x] [] Discussed role of white blood cells and how they help to decrease infection in our body, and how these are often decreased with chemotherapy. Discussed the importance of hand hygiene, avoiding large crowds and people that could possibly be sick. Instructed patient to call MD if temperature reaches 100.4.    NUTRITION & APPETITE CHANGES:  importance of maintaining healthy diet & weight, ways to manage to improve intake, dietary consult, exercise regimen [] []    DIARRHEA:  causes, s/s of dehydration, ways to manage, dietary changes, when to call MD [x] [] Discussed the possibility of diarrhea while on therapy. Instructed patient that they can use OTC  loperamide, but to contact MD if they find no relief.   CONSTIPATION:  causes, ways to manage, dietary changes, when to call MD [x] [] Discussed risk of constipation and reviewed use of over the counter colace or miralax for relief. Recommended against enemas or suppositories without consulting MD.    NAUSEA & VOMITING:  cause, use of antiemetics, dietary changes, when to call MD [x] [] Reviewed risk of nausea and vomiting and counseled on using as needed ondansetron.    MOUTH SORES:  causes, oral care, ways to manage [] []    ALOPECIA:  cause, ways to manage, resources [] []    INFERTILITY & SEXUALITY:  causes, fertility preservation options, sexuality changes, ways to manage, importance of birth control [x] [] Safe sex practices with using barrier method such as condoms discussed.   NERVOUS SYSTEM CHANGES:  causes, s/s, neuropathies, cognitive changes, ways to manage [] []    PAIN:  causes, ways to manage [x] [] Discussed the potential for muscle and joint pain. Instructed patient that they can use APAP to relieve their symptoms?   SKIN & NAIL CHANGES:  cause, s/s, ways to manage [] []    ORGAN TOXICITIES:  cause, s/s, need for diagnostic tests, labs, when to notify MD [x] [] Discussed the potential for renal and hepatic toxicities. Informed patient that we would be monitoring patients organ function when we take their labs.   SURVIVORSHIP:  distress, distress assessment, secondary malignancies, early/late effects, follow-up, social issues, social support [] []    HOME CARE:  use of spill kits, storing of PO chemo, how to manage bodily fluids [x] [] Counseled on management of soiled linens and proper flush technique. Discussed how to manage all the side effects at home and advised when to contact the MD office.    MISCELLANEOUS:  drug interactions, administration, vesicant, et [x] [] DDI: No DDI to discuss. Instructed patient to start taking vitamin D and calcium supplements since he is starting Xgeva.      Referrals:    N/A    Notes:   Discussed above material with patient over the phone. All questions and concerns were addressed. Patient will be mailed a personalized calendar, written educational materials, and Bernadette Sargent's card. Instructed patient to call should they have any additional questions.

## 2020-12-28 NOTE — PLAN OF CARE
Oral Chemotherapy Teaching      Patient Name/:  Gene Bond   1955  Oral Chemotherapy Regimen:  Revlimid 1 cap daily PO for 21 days and 7 days off. Dexamethasone 4mg 10 tabs on days 1, 8, 15, and 22 and 1 tab for 2 days following each Darzelex infusion.  Date Started Medication: 2020    Initial Teaching Follow Up Comments     Safety     Storage instructions (away from children; away from heat/cold, sunlight, or moisture), handling - use of gloves (caregivers), washing hands after touching pills, managing waste     “How are you storing your medications?”, reminders on storage, proper handling (caregivers using gloves, washing hands, away from children, managing waste, etc.), disposal of medication with D/C or dosage change    Discussed proper storage in dry, room temperature area and for caregivers to use gloves when handling. Talked about washing hands after patient handles medication.     Adherence      patient and/or caregiver on how to take medication, take with/without food, assess their adherence potential, stress importance of adherence, ways to manage adherence (pill boxes, phone reminders, calendars), what to do if miss a dose   “How are you taking your medication?” “How are you remembering to take your medication?”, “How many doses have you missed?”, determine reasons for non-adherence (not remembering, side effects, etc), ways to improve, overadherence? Remind patient of ways to improve/maintain adherence   Discussed directions for taking medications according to personalized calender. Talked about different methods of adherence. Patient uses a pill box and says he has not missed a dose of medications in over 5 years and takes them around 4am each day.      Side Effects/Adverse Reactions      patient on potential side effects, s/s, ways to manage, when to call MD/seek help     Determine if patient experiencing side effects, ways to manage  Discussed common side effects of bone  marrow suppression, thromboembolic events, muscle and bone pain, dermatological reactions, and edema for Revlimid. Discussed hyperglycemia, insomnia, headache, and upset stomach (advised to take with food) for dexamethasone. Also discussed immunocompromised status and to call MD if side effects become unmanageable or fever of >100.4 F. Patient has been on both medications before and tolerated them well.     Miscellaneous     Food interactions, DDIs, financial issues Determine if patient started any new medications since being placed on oral chemo (analyze for DDI) No notable DDIs, told him to call if he starts any new medicaitons. He will keep an eye on his glucose level since it spiked when he was previously on dexamethasone and knows to use insulin if it getting too high.      Additional Notes:  Discussed aforementioned materials with patient and accompanying caregiver. All questions and concerns were addressed and instructed patient to contact us with any further quesitons.Will mail patient personalized treatment calendar, revlimid, darzelex, dexamethasone, and xgeva education sheets, and Bernadette Sargent's contact information.

## 2021-01-01 ENCOUNTER — HOSPITAL ENCOUNTER (OUTPATIENT)
Dept: ONCOLOGY | Facility: HOSPITAL | Age: 66
Discharge: HOME | End: 2021-09-27
Admitting: INTERNAL MEDICINE

## 2021-01-01 ENCOUNTER — INFUSION (OUTPATIENT)
Dept: ONCOLOGY | Facility: HOSPITAL | Age: 66
End: 2021-01-01

## 2021-01-01 ENCOUNTER — HOSPITAL ENCOUNTER (OUTPATIENT)
Dept: ONCOLOGY | Facility: HOSPITAL | Age: 66
Setting detail: INFUSION SERIES
Discharge: HOME | End: 2021-09-28

## 2021-01-01 ENCOUNTER — TELEPHONE (OUTPATIENT)
Dept: ONCOLOGY | Facility: CLINIC | Age: 66
End: 2021-01-01

## 2021-01-01 ENCOUNTER — HOSPITAL ENCOUNTER (OUTPATIENT)
Dept: INFUSION THERAPY | Facility: HOSPITAL | Age: 66
Setting detail: INFUSION SERIES
Discharge: HOME | End: 2021-09-15

## 2021-01-01 ENCOUNTER — OFFICE VISIT (OUTPATIENT)
Dept: ONCOLOGY | Facility: CLINIC | Age: 66
End: 2021-01-01

## 2021-01-01 ENCOUNTER — TELEPHONE (OUTPATIENT)
Dept: FAMILY MEDICINE CLINIC | Facility: CLINIC | Age: 66
End: 2021-01-01

## 2021-01-01 ENCOUNTER — OFFICE VISIT (OUTPATIENT)
Dept: FAMILY MEDICINE CLINIC | Facility: CLINIC | Age: 66
End: 2021-01-01

## 2021-01-01 ENCOUNTER — PROCEDURE VISIT (OUTPATIENT)
Dept: ONCOLOGY | Facility: CLINIC | Age: 66
End: 2021-01-01

## 2021-01-01 ENCOUNTER — APPOINTMENT (OUTPATIENT)
Dept: ONCOLOGY | Facility: HOSPITAL | Age: 66
End: 2021-01-01

## 2021-01-01 ENCOUNTER — LAB (OUTPATIENT)
Dept: ONCOLOGY | Facility: HOSPITAL | Age: 66
End: 2021-01-01

## 2021-01-01 ENCOUNTER — SPECIALTY PHARMACY (OUTPATIENT)
Dept: ONCOLOGY | Facility: HOSPITAL | Age: 66
End: 2021-01-01

## 2021-01-01 ENCOUNTER — HOSPITAL ENCOUNTER (OUTPATIENT)
Dept: INFUSION THERAPY | Facility: HOSPITAL | Age: 66
Setting detail: INFUSION SERIES
Discharge: HOME | End: 2021-09-23

## 2021-01-01 ENCOUNTER — DOCUMENTATION (OUTPATIENT)
Dept: NUTRITION | Facility: HOSPITAL | Age: 66
End: 2021-01-01

## 2021-01-01 ENCOUNTER — LAB (OUTPATIENT)
Dept: LAB | Facility: HOSPITAL | Age: 66
End: 2021-01-01

## 2021-01-01 ENCOUNTER — TELEPHONE (OUTPATIENT)
Dept: ONCOLOGY | Facility: OTHER | Age: 66
End: 2021-01-01

## 2021-01-01 ENCOUNTER — PREP FOR SURGERY (OUTPATIENT)
Dept: OTHER | Facility: HOSPITAL | Age: 66
End: 2021-01-01

## 2021-01-01 ENCOUNTER — OFFICE VISIT (OUTPATIENT)
Dept: CARDIOLOGY | Facility: CLINIC | Age: 66
End: 2021-01-01

## 2021-01-01 VITALS
BODY MASS INDEX: 28.66 KG/M2 | TEMPERATURE: 97.8 F | RESPIRATION RATE: 20 BRPM | WEIGHT: 194.1 LBS | SYSTOLIC BLOOD PRESSURE: 125 MMHG | OXYGEN SATURATION: 97 % | HEART RATE: 75 BPM | DIASTOLIC BLOOD PRESSURE: 74 MMHG

## 2021-01-01 VITALS
BODY MASS INDEX: 29.06 KG/M2 | HEART RATE: 81 BPM | OXYGEN SATURATION: 96 % | DIASTOLIC BLOOD PRESSURE: 73 MMHG | OXYGEN SATURATION: 95 % | TEMPERATURE: 98 F | HEART RATE: 74 BPM | WEIGHT: 196.8 LBS | BODY MASS INDEX: 29.82 KG/M2 | RESPIRATION RATE: 16 BRPM | TEMPERATURE: 98.4 F | RESPIRATION RATE: 16 BRPM | SYSTOLIC BLOOD PRESSURE: 136 MMHG | WEIGHT: 201.9 LBS | DIASTOLIC BLOOD PRESSURE: 75 MMHG | SYSTOLIC BLOOD PRESSURE: 112 MMHG

## 2021-01-01 VITALS
DIASTOLIC BLOOD PRESSURE: 67 MMHG | SYSTOLIC BLOOD PRESSURE: 118 MMHG | TEMPERATURE: 97.2 F | HEART RATE: 70 BPM | WEIGHT: 195.7 LBS | BODY MASS INDEX: 28.9 KG/M2

## 2021-01-01 VITALS
RESPIRATION RATE: 16 BRPM | BODY MASS INDEX: 29.33 KG/M2 | OXYGEN SATURATION: 99 % | HEART RATE: 62 BPM | TEMPERATURE: 97.4 F | HEIGHT: 69 IN | WEIGHT: 198 LBS | SYSTOLIC BLOOD PRESSURE: 140 MMHG | DIASTOLIC BLOOD PRESSURE: 61 MMHG

## 2021-01-01 VITALS
RESPIRATION RATE: 16 BRPM | WEIGHT: 198 LBS | TEMPERATURE: 97.3 F | HEART RATE: 68 BPM | BODY MASS INDEX: 29.33 KG/M2 | OXYGEN SATURATION: 98 % | DIASTOLIC BLOOD PRESSURE: 61 MMHG | HEIGHT: 69 IN | SYSTOLIC BLOOD PRESSURE: 109 MMHG

## 2021-01-01 VITALS
BODY MASS INDEX: 28.73 KG/M2 | RESPIRATION RATE: 16 BRPM | HEIGHT: 69 IN | SYSTOLIC BLOOD PRESSURE: 128 MMHG | DIASTOLIC BLOOD PRESSURE: 83 MMHG | TEMPERATURE: 97.3 F | WEIGHT: 194 LBS | HEART RATE: 63 BPM | OXYGEN SATURATION: 98 %

## 2021-01-01 VITALS
SYSTOLIC BLOOD PRESSURE: 122 MMHG | BODY MASS INDEX: 28.5 KG/M2 | HEART RATE: 83 BPM | RESPIRATION RATE: 18 BRPM | DIASTOLIC BLOOD PRESSURE: 75 MMHG | WEIGHT: 193 LBS | TEMPERATURE: 97.3 F

## 2021-01-01 VITALS
HEART RATE: 84 BPM | SYSTOLIC BLOOD PRESSURE: 132 MMHG | BODY MASS INDEX: 28.73 KG/M2 | RESPIRATION RATE: 14 BRPM | DIASTOLIC BLOOD PRESSURE: 88 MMHG | HEIGHT: 69 IN | OXYGEN SATURATION: 99 % | TEMPERATURE: 96.8 F | WEIGHT: 194 LBS

## 2021-01-01 VITALS
DIASTOLIC BLOOD PRESSURE: 63 MMHG | SYSTOLIC BLOOD PRESSURE: 118 MMHG | BODY MASS INDEX: 30.13 KG/M2 | WEIGHT: 204 LBS | HEART RATE: 82 BPM | TEMPERATURE: 98.2 F

## 2021-01-01 VITALS
WEIGHT: 195.8 LBS | DIASTOLIC BLOOD PRESSURE: 62 MMHG | TEMPERATURE: 97.2 F | BODY MASS INDEX: 28.91 KG/M2 | SYSTOLIC BLOOD PRESSURE: 135 MMHG | HEART RATE: 66 BPM

## 2021-01-01 VITALS
BODY MASS INDEX: 29.18 KG/M2 | OXYGEN SATURATION: 98 % | RESPIRATION RATE: 20 BRPM | TEMPERATURE: 97.3 F | WEIGHT: 197 LBS | HEART RATE: 63 BPM | SYSTOLIC BLOOD PRESSURE: 116 MMHG | HEIGHT: 69 IN | DIASTOLIC BLOOD PRESSURE: 54 MMHG

## 2021-01-01 VITALS
SYSTOLIC BLOOD PRESSURE: 132 MMHG | OXYGEN SATURATION: 97 % | RESPIRATION RATE: 16 BRPM | BODY MASS INDEX: 28.8 KG/M2 | HEART RATE: 76 BPM | WEIGHT: 195 LBS | DIASTOLIC BLOOD PRESSURE: 69 MMHG | TEMPERATURE: 97.7 F

## 2021-01-01 VITALS
TEMPERATURE: 97.7 F | RESPIRATION RATE: 16 BRPM | HEART RATE: 64 BPM | SYSTOLIC BLOOD PRESSURE: 128 MMHG | BODY MASS INDEX: 28.8 KG/M2 | DIASTOLIC BLOOD PRESSURE: 65 MMHG | OXYGEN SATURATION: 100 % | WEIGHT: 195 LBS

## 2021-01-01 VITALS
OXYGEN SATURATION: 99 % | HEART RATE: 71 BPM | DIASTOLIC BLOOD PRESSURE: 60 MMHG | TEMPERATURE: 97.9 F | SYSTOLIC BLOOD PRESSURE: 103 MMHG | RESPIRATION RATE: 18 BRPM

## 2021-01-01 VITALS
DIASTOLIC BLOOD PRESSURE: 75 MMHG | TEMPERATURE: 97.5 F | OXYGEN SATURATION: 99 % | RESPIRATION RATE: 16 BRPM | HEART RATE: 60 BPM | SYSTOLIC BLOOD PRESSURE: 160 MMHG

## 2021-01-01 VITALS
OXYGEN SATURATION: 98 % | SYSTOLIC BLOOD PRESSURE: 131 MMHG | HEART RATE: 73 BPM | OXYGEN SATURATION: 97 % | HEART RATE: 68 BPM | BODY MASS INDEX: 28.29 KG/M2 | RESPIRATION RATE: 16 BRPM | DIASTOLIC BLOOD PRESSURE: 56 MMHG | RESPIRATION RATE: 18 BRPM | SYSTOLIC BLOOD PRESSURE: 115 MMHG | DIASTOLIC BLOOD PRESSURE: 64 MMHG | WEIGHT: 191 LBS | HEIGHT: 69 IN | TEMPERATURE: 97.8 F | TEMPERATURE: 97.1 F

## 2021-01-01 VITALS
HEART RATE: 68 BPM | TEMPERATURE: 98.4 F | SYSTOLIC BLOOD PRESSURE: 133 MMHG | RESPIRATION RATE: 16 BRPM | WEIGHT: 192.8 LBS | BODY MASS INDEX: 28.47 KG/M2 | DIASTOLIC BLOOD PRESSURE: 65 MMHG

## 2021-01-01 VITALS
TEMPERATURE: 97.5 F | OXYGEN SATURATION: 97 % | BODY MASS INDEX: 30.36 KG/M2 | SYSTOLIC BLOOD PRESSURE: 96 MMHG | HEART RATE: 74 BPM | WEIGHT: 205 LBS | DIASTOLIC BLOOD PRESSURE: 53 MMHG | HEIGHT: 69 IN | RESPIRATION RATE: 24 BRPM

## 2021-01-01 VITALS
OXYGEN SATURATION: 95 % | DIASTOLIC BLOOD PRESSURE: 70 MMHG | HEART RATE: 68 BPM | SYSTOLIC BLOOD PRESSURE: 120 MMHG | TEMPERATURE: 97.5 F | RESPIRATION RATE: 16 BRPM

## 2021-01-01 VITALS
WEIGHT: 200 LBS | OXYGEN SATURATION: 99 % | SYSTOLIC BLOOD PRESSURE: 117 MMHG | DIASTOLIC BLOOD PRESSURE: 60 MMHG | RESPIRATION RATE: 20 BRPM | HEART RATE: 75 BPM | TEMPERATURE: 97.3 F | BODY MASS INDEX: 29.53 KG/M2

## 2021-01-01 VITALS
HEART RATE: 66 BPM | RESPIRATION RATE: 16 BRPM | DIASTOLIC BLOOD PRESSURE: 60 MMHG | SYSTOLIC BLOOD PRESSURE: 112 MMHG | WEIGHT: 195 LBS | BODY MASS INDEX: 28.8 KG/M2 | TEMPERATURE: 97.3 F

## 2021-01-01 VITALS
RESPIRATION RATE: 16 BRPM | HEART RATE: 76 BPM | OXYGEN SATURATION: 100 % | DIASTOLIC BLOOD PRESSURE: 88 MMHG | WEIGHT: 197 LBS | HEIGHT: 69 IN | DIASTOLIC BLOOD PRESSURE: 64 MMHG | SYSTOLIC BLOOD PRESSURE: 104 MMHG | BODY MASS INDEX: 28.58 KG/M2 | WEIGHT: 193 LBS | SYSTOLIC BLOOD PRESSURE: 146 MMHG | HEART RATE: 79 BPM | OXYGEN SATURATION: 94 % | HEIGHT: 69 IN | BODY MASS INDEX: 29.18 KG/M2

## 2021-01-01 VITALS
DIASTOLIC BLOOD PRESSURE: 95 MMHG | HEART RATE: 59 BPM | TEMPERATURE: 96.9 F | SYSTOLIC BLOOD PRESSURE: 153 MMHG | RESPIRATION RATE: 16 BRPM

## 2021-01-01 VITALS — WEIGHT: 194.2 LBS | BODY MASS INDEX: 28.68 KG/M2

## 2021-01-01 VITALS
HEART RATE: 77 BPM | OXYGEN SATURATION: 98 % | TEMPERATURE: 97.5 F | DIASTOLIC BLOOD PRESSURE: 59 MMHG | SYSTOLIC BLOOD PRESSURE: 120 MMHG | RESPIRATION RATE: 16 BRPM | WEIGHT: 197 LBS | BODY MASS INDEX: 29.18 KG/M2 | HEIGHT: 69 IN

## 2021-01-01 VITALS
OXYGEN SATURATION: 96 % | HEART RATE: 55 BPM | SYSTOLIC BLOOD PRESSURE: 148 MMHG | RESPIRATION RATE: 16 BRPM | DIASTOLIC BLOOD PRESSURE: 81 MMHG | TEMPERATURE: 97.8 F

## 2021-01-01 VITALS
BODY MASS INDEX: 29.17 KG/M2 | TEMPERATURE: 96.7 F | DIASTOLIC BLOOD PRESSURE: 88 MMHG | WEIGHT: 197.5 LBS | HEART RATE: 71 BPM | SYSTOLIC BLOOD PRESSURE: 144 MMHG | RESPIRATION RATE: 16 BRPM

## 2021-01-01 VITALS
OXYGEN SATURATION: 100 % | RESPIRATION RATE: 16 BRPM | DIASTOLIC BLOOD PRESSURE: 63 MMHG | SYSTOLIC BLOOD PRESSURE: 117 MMHG | HEART RATE: 63 BPM | TEMPERATURE: 97.8 F

## 2021-01-01 VITALS
BODY MASS INDEX: 28.88 KG/M2 | OXYGEN SATURATION: 98 % | HEIGHT: 69 IN | TEMPERATURE: 97.3 F | SYSTOLIC BLOOD PRESSURE: 141 MMHG | RESPIRATION RATE: 20 BRPM | DIASTOLIC BLOOD PRESSURE: 65 MMHG | WEIGHT: 195 LBS | HEART RATE: 65 BPM

## 2021-01-01 VITALS
DIASTOLIC BLOOD PRESSURE: 61 MMHG | SYSTOLIC BLOOD PRESSURE: 117 MMHG | RESPIRATION RATE: 18 BRPM | HEART RATE: 63 BPM | OXYGEN SATURATION: 64 % | TEMPERATURE: 98 F

## 2021-01-01 VITALS
RESPIRATION RATE: 16 BRPM | DIASTOLIC BLOOD PRESSURE: 55 MMHG | WEIGHT: 198 LBS | SYSTOLIC BLOOD PRESSURE: 112 MMHG | TEMPERATURE: 97.7 F | HEART RATE: 72 BPM | BODY MASS INDEX: 29.24 KG/M2

## 2021-01-01 VITALS
OXYGEN SATURATION: 98 % | RESPIRATION RATE: 18 BRPM | HEART RATE: 62 BPM | DIASTOLIC BLOOD PRESSURE: 62 MMHG | TEMPERATURE: 97.8 F | SYSTOLIC BLOOD PRESSURE: 120 MMHG

## 2021-01-01 VITALS
HEART RATE: 61 BPM | DIASTOLIC BLOOD PRESSURE: 81 MMHG | BODY MASS INDEX: 28.29 KG/M2 | SYSTOLIC BLOOD PRESSURE: 134 MMHG | TEMPERATURE: 98 F | RESPIRATION RATE: 16 BRPM | WEIGHT: 191.6 LBS

## 2021-01-01 VITALS
RESPIRATION RATE: 16 BRPM | DIASTOLIC BLOOD PRESSURE: 63 MMHG | HEART RATE: 68 BPM | SYSTOLIC BLOOD PRESSURE: 115 MMHG | OXYGEN SATURATION: 97 % | TEMPERATURE: 97.7 F

## 2021-01-01 VITALS — WEIGHT: 192.5 LBS | BODY MASS INDEX: 28.43 KG/M2

## 2021-01-01 VITALS
HEART RATE: 62 BPM | RESPIRATION RATE: 16 BRPM | BODY MASS INDEX: 28.78 KG/M2 | SYSTOLIC BLOOD PRESSURE: 134 MMHG | WEIGHT: 194.9 LBS | DIASTOLIC BLOOD PRESSURE: 72 MMHG | OXYGEN SATURATION: 97 % | TEMPERATURE: 97.5 F

## 2021-01-01 VITALS — HEART RATE: 66 BPM | SYSTOLIC BLOOD PRESSURE: 100 MMHG | DIASTOLIC BLOOD PRESSURE: 54 MMHG

## 2021-01-01 VITALS
RESPIRATION RATE: 16 BRPM | DIASTOLIC BLOOD PRESSURE: 60 MMHG | HEIGHT: 69 IN | WEIGHT: 192 LBS | TEMPERATURE: 97.3 F | BODY MASS INDEX: 28.44 KG/M2 | HEART RATE: 66 BPM | SYSTOLIC BLOOD PRESSURE: 128 MMHG | OXYGEN SATURATION: 98 %

## 2021-01-01 VITALS
OXYGEN SATURATION: 94 % | DIASTOLIC BLOOD PRESSURE: 69 MMHG | SYSTOLIC BLOOD PRESSURE: 129 MMHG | HEART RATE: 77 BPM | BODY MASS INDEX: 29.09 KG/M2 | TEMPERATURE: 97.5 F | WEIGHT: 197 LBS | RESPIRATION RATE: 16 BRPM

## 2021-01-01 VITALS
RESPIRATION RATE: 16 BRPM | SYSTOLIC BLOOD PRESSURE: 135 MMHG | TEMPERATURE: 98.6 F | DIASTOLIC BLOOD PRESSURE: 78 MMHG | HEART RATE: 78 BPM

## 2021-01-01 VITALS
HEART RATE: 93 BPM | DIASTOLIC BLOOD PRESSURE: 79 MMHG | BODY MASS INDEX: 28.21 KG/M2 | TEMPERATURE: 96.9 F | SYSTOLIC BLOOD PRESSURE: 144 MMHG | RESPIRATION RATE: 16 BRPM | WEIGHT: 191 LBS

## 2021-01-01 VITALS
HEART RATE: 63 BPM | BODY MASS INDEX: 28.5 KG/M2 | RESPIRATION RATE: 16 BRPM | TEMPERATURE: 97.5 F | SYSTOLIC BLOOD PRESSURE: 154 MMHG | WEIGHT: 193 LBS | DIASTOLIC BLOOD PRESSURE: 72 MMHG

## 2021-01-01 VITALS — BODY MASS INDEX: 28.68 KG/M2 | WEIGHT: 194.2 LBS

## 2021-01-01 DIAGNOSIS — C90.00 MULTIPLE MYELOMA NOT HAVING ACHIEVED REMISSION (HCC): ICD-10-CM

## 2021-01-01 DIAGNOSIS — C90.00 MULTIPLE MYELOMA NOT HAVING ACHIEVED REMISSION (HCC): Primary | ICD-10-CM

## 2021-01-01 DIAGNOSIS — Z86.718 HISTORY OF DVT (DEEP VEIN THROMBOSIS): ICD-10-CM

## 2021-01-01 DIAGNOSIS — R94.31 ABNORMAL ELECTROCARDIOGRAM (ECG) (EKG): ICD-10-CM

## 2021-01-01 DIAGNOSIS — I10 ESSENTIAL HYPERTENSION: ICD-10-CM

## 2021-01-01 DIAGNOSIS — E78.2 MIXED HYPERLIPIDEMIA: ICD-10-CM

## 2021-01-01 DIAGNOSIS — G62.0 CHEMOTHERAPY-INDUCED NEUROPATHY (HCC): Primary | ICD-10-CM

## 2021-01-01 DIAGNOSIS — D61.818 PANCYTOPENIA (HCC): ICD-10-CM

## 2021-01-01 DIAGNOSIS — D69.59 THROMBOCYTOPENIA, SECONDARY: ICD-10-CM

## 2021-01-01 DIAGNOSIS — I25.10 CORONARY ARTERY DISEASE INVOLVING NATIVE HEART WITHOUT ANGINA PECTORIS, UNSPECIFIED VESSEL OR LESION TYPE: ICD-10-CM

## 2021-01-01 DIAGNOSIS — J44.9 CHRONIC OBSTRUCTIVE PULMONARY DISEASE, UNSPECIFIED COPD TYPE (HCC): ICD-10-CM

## 2021-01-01 DIAGNOSIS — I50.22 CHRONIC SYSTOLIC CONGESTIVE HEART FAILURE (HCC): ICD-10-CM

## 2021-01-01 DIAGNOSIS — T45.1X5A CHEMOTHERAPY-INDUCED NEUROPATHY (HCC): Primary | ICD-10-CM

## 2021-01-01 DIAGNOSIS — D50.0 IRON DEFICIENCY ANEMIA DUE TO CHRONIC BLOOD LOSS: ICD-10-CM

## 2021-01-01 DIAGNOSIS — R94.30 LOW LEFT VENTRICULAR EJECTION FRACTION: Primary | ICD-10-CM

## 2021-01-01 DIAGNOSIS — E11.9 TYPE 2 DIABETES MELLITUS WITHOUT COMPLICATION, WITHOUT LONG-TERM CURRENT USE OF INSULIN (HCC): Primary | ICD-10-CM

## 2021-01-01 DIAGNOSIS — D50.0 IRON DEFICIENCY ANEMIA DUE TO CHRONIC BLOOD LOSS: Primary | ICD-10-CM

## 2021-01-01 LAB
ABO GROUP BLD: NORMAL
ALBUMIN SERPL ELPH-MCNC: 3.3 G/DL (ref 2.9–4.4)
ALBUMIN SERPL ELPH-MCNC: 3.5 G/DL (ref 2.9–4.4)
ALBUMIN SERPL ELPH-MCNC: 3.6 G/DL (ref 2.9–4.4)
ALBUMIN SERPL ELPH-MCNC: 3.6 G/DL (ref 2.9–4.4)
ALBUMIN SERPL ELPH-MCNC: 3.7 G/DL (ref 2.9–4.4)
ALBUMIN SERPL ELPH-MCNC: 3.7 G/DL (ref 2.9–4.4)
ALBUMIN SERPL ELPH-MCNC: 4.1 G/DL (ref 2.9–4.4)
ALBUMIN SERPL-MCNC: 3.4 G/DL (ref 3.5–5.2)
ALBUMIN SERPL-MCNC: 3.5 G/DL (ref 3.5–5.2)
ALBUMIN SERPL-MCNC: 3.5 G/DL (ref 3.5–5.2)
ALBUMIN SERPL-MCNC: 3.6 G/DL (ref 3.5–5.2)
ALBUMIN SERPL-MCNC: 3.7 G/DL (ref 3.5–5.2)
ALBUMIN SERPL-MCNC: 3.8 G/DL (ref 3.5–5.2)
ALBUMIN SERPL-MCNC: 3.8 G/DL (ref 3.5–5.2)
ALBUMIN SERPL-MCNC: 3.9 G/DL (ref 3.5–5.2)
ALBUMIN SERPL-MCNC: 4 G/DL (ref 3.5–5.2)
ALBUMIN SERPL-MCNC: 4.1 G/DL (ref 3.5–5.2)
ALBUMIN SERPL-MCNC: 4.2 G/DL (ref 3.5–5.2)
ALBUMIN/GLOB SERPL: 0.7 G/DL
ALBUMIN/GLOB SERPL: 0.8 G/DL
ALBUMIN/GLOB SERPL: 0.8 {RATIO} (ref 0.7–1.7)
ALBUMIN/GLOB SERPL: 0.9 G/DL
ALBUMIN/GLOB SERPL: 1 G/DL
ALBUMIN/GLOB SERPL: 1 G/DL
ALBUMIN/GLOB SERPL: 1 {RATIO} (ref 0.7–1.7)
ALBUMIN/GLOB SERPL: 1 {RATIO} (ref 0.7–1.7)
ALBUMIN/GLOB SERPL: 1.1 G/DL
ALBUMIN/GLOB SERPL: 1.1 G/DL
ALBUMIN/GLOB SERPL: 1.1 {RATIO} (ref 0.7–1.7)
ALBUMIN/GLOB SERPL: 1.2 G/DL
ALBUMIN/GLOB SERPL: 1.2 G/DL
ALBUMIN/GLOB SERPL: 1.2 {RATIO} (ref 0.7–1.7)
ALBUMIN/GLOB SERPL: 1.3 G/DL
ALBUMIN/GLOB SERPL: 1.3 {RATIO} (ref 0.7–1.7)
ALBUMIN/GLOB SERPL: 1.4 G/DL
ALBUMIN/GLOB SERPL: 1.5 G/DL
ALBUMIN/GLOB SERPL: 1.6 G/DL
ALBUMIN/GLOB SERPL: 1.7 G/DL
ALP SERPL-CCNC: 100 U/L (ref 39–117)
ALP SERPL-CCNC: 43 U/L (ref 39–117)
ALP SERPL-CCNC: 44 U/L (ref 39–117)
ALP SERPL-CCNC: 44 U/L (ref 39–117)
ALP SERPL-CCNC: 45 U/L (ref 39–117)
ALP SERPL-CCNC: 48 U/L (ref 39–117)
ALP SERPL-CCNC: 49 U/L (ref 39–117)
ALP SERPL-CCNC: 50 U/L (ref 39–117)
ALP SERPL-CCNC: 51 U/L (ref 39–117)
ALP SERPL-CCNC: 51 U/L (ref 39–117)
ALP SERPL-CCNC: 52 U/L (ref 39–117)
ALP SERPL-CCNC: 53 U/L (ref 39–117)
ALP SERPL-CCNC: 54 U/L (ref 39–117)
ALP SERPL-CCNC: 55 U/L (ref 39–117)
ALP SERPL-CCNC: 55 U/L (ref 39–117)
ALP SERPL-CCNC: 56 U/L (ref 39–117)
ALP SERPL-CCNC: 56 U/L (ref 39–117)
ALP SERPL-CCNC: 58 U/L (ref 39–117)
ALP SERPL-CCNC: 62 U/L (ref 39–117)
ALP SERPL-CCNC: 66 U/L (ref 39–117)
ALP SERPL-CCNC: 72 U/L (ref 39–117)
ALPHA1 GLOB SERPL ELPH-MCNC: 0.1 G/DL (ref 0–0.4)
ALPHA1 GLOB SERPL ELPH-MCNC: 0.2 G/DL (ref 0–0.4)
ALPHA1 GLOB SERPL ELPH-MCNC: 0.3 G/DL (ref 0–0.4)
ALPHA2 GLOB SERPL ELPH-MCNC: 0.6 G/DL (ref 0.4–1)
ALPHA2 GLOB SERPL ELPH-MCNC: 0.7 G/DL (ref 0.4–1)
ALPHA2 GLOB SERPL ELPH-MCNC: 0.8 G/DL (ref 0.4–1)
ALPHA2 GLOB SERPL ELPH-MCNC: 0.9 G/DL (ref 0.4–1)
ALT SERPL W P-5'-P-CCNC: 10 U/L (ref 1–41)
ALT SERPL W P-5'-P-CCNC: 11 U/L (ref 1–41)
ALT SERPL W P-5'-P-CCNC: 11 U/L (ref 1–41)
ALT SERPL W P-5'-P-CCNC: 12 U/L (ref 1–41)
ALT SERPL W P-5'-P-CCNC: 13 U/L (ref 1–41)
ALT SERPL W P-5'-P-CCNC: 15 U/L (ref 1–41)
ALT SERPL W P-5'-P-CCNC: 15 U/L (ref 1–41)
ALT SERPL W P-5'-P-CCNC: 16 U/L (ref 1–41)
ALT SERPL W P-5'-P-CCNC: 17 U/L (ref 1–41)
ALT SERPL W P-5'-P-CCNC: 18 U/L (ref 1–41)
ALT SERPL W P-5'-P-CCNC: 22 U/L (ref 1–41)
ALT SERPL W P-5'-P-CCNC: 6 U/L (ref 1–41)
ALT SERPL W P-5'-P-CCNC: 9 U/L (ref 1–41)
ANION GAP SERPL CALCULATED.3IONS-SCNC: 10 MMOL/L (ref 5–15)
ANION GAP SERPL CALCULATED.3IONS-SCNC: 10 MMOL/L (ref 5–15)
ANION GAP SERPL CALCULATED.3IONS-SCNC: 10.1 MMOL/L (ref 5–15)
ANION GAP SERPL CALCULATED.3IONS-SCNC: 10.2 MMOL/L (ref 5–15)
ANION GAP SERPL CALCULATED.3IONS-SCNC: 10.4 MMOL/L (ref 5–15)
ANION GAP SERPL CALCULATED.3IONS-SCNC: 10.7 MMOL/L (ref 5–15)
ANION GAP SERPL CALCULATED.3IONS-SCNC: 11 MMOL/L (ref 5–15)
ANION GAP SERPL CALCULATED.3IONS-SCNC: 11.5 MMOL/L (ref 5–15)
ANION GAP SERPL CALCULATED.3IONS-SCNC: 12.5 MMOL/L (ref 5–15)
ANION GAP SERPL CALCULATED.3IONS-SCNC: 12.7 MMOL/L (ref 5–15)
ANION GAP SERPL CALCULATED.3IONS-SCNC: 12.7 MMOL/L (ref 5–15)
ANION GAP SERPL CALCULATED.3IONS-SCNC: 13 MMOL/L (ref 5–15)
ANION GAP SERPL CALCULATED.3IONS-SCNC: 13.4 MMOL/L (ref 5–15)
ANION GAP SERPL CALCULATED.3IONS-SCNC: 13.7 MMOL/L (ref 5–15)
ANION GAP SERPL CALCULATED.3IONS-SCNC: 14.3 MMOL/L (ref 5–15)
ANION GAP SERPL CALCULATED.3IONS-SCNC: 15.9 MMOL/L (ref 5–15)
ANION GAP SERPL CALCULATED.3IONS-SCNC: 16.2 MMOL/L (ref 5–15)
ANION GAP SERPL CALCULATED.3IONS-SCNC: 17 MMOL/L (ref 5–15)
ANION GAP SERPL CALCULATED.3IONS-SCNC: 18.5 MMOL/L (ref 5–15)
ANION GAP SERPL CALCULATED.3IONS-SCNC: 21 MMOL/L (ref 5–15)
ANION GAP SERPL CALCULATED.3IONS-SCNC: 24.5 MMOL/L (ref 5–15)
ANION GAP SERPL CALCULATED.3IONS-SCNC: 8.1 MMOL/L (ref 5–15)
ANION GAP SERPL CALCULATED.3IONS-SCNC: 8.9 MMOL/L (ref 5–15)
ANION GAP SERPL CALCULATED.3IONS-SCNC: 9.3 MMOL/L (ref 5–15)
ANION GAP SERPL CALCULATED.3IONS-SCNC: 9.9 MMOL/L (ref 5–15)
ANISOCYTOSIS BLD QL: ABNORMAL
ANISOCYTOSIS BLD QL: NORMAL
ANTI-E: NORMAL
ANTI-FYB: NORMAL
ANTI-K: NORMAL
ANTI-LITTLE C: NORMAL
ANTI-S: NORMAL
AST SERPL-CCNC: 11 U/L (ref 1–40)
AST SERPL-CCNC: 12 U/L (ref 1–40)
AST SERPL-CCNC: 13 U/L (ref 1–40)
AST SERPL-CCNC: 14 U/L (ref 1–40)
AST SERPL-CCNC: 15 U/L (ref 1–40)
AST SERPL-CCNC: 16 U/L (ref 1–40)
AST SERPL-CCNC: 17 U/L (ref 1–40)
AST SERPL-CCNC: 20 U/L (ref 1–40)
AST SERPL-CCNC: 21 U/L (ref 1–40)
AST SERPL-CCNC: 29 U/L (ref 1–40)
AST SERPL-CCNC: 30 U/L (ref 1–40)
B-GLOBULIN SERPL ELPH-MCNC: 1.5 G/DL (ref 0.7–1.3)
B-GLOBULIN SERPL ELPH-MCNC: 1.6 G/DL (ref 0.7–1.3)
B-GLOBULIN SERPL ELPH-MCNC: 2.1 G/DL (ref 0.7–1.3)
B-GLOBULIN SERPL ELPH-MCNC: 2.2 G/DL (ref 0.7–1.3)
B-GLOBULIN SERPL ELPH-MCNC: 2.8 G/DL (ref 0.7–1.3)
B-GLOBULIN SERPL ELPH-MCNC: 3.4 G/DL (ref 0.7–1.3)
BASOPHILS # BLD AUTO: 0 10*3/MM3 (ref 0–0.2)
BASOPHILS # BLD AUTO: 0.01 10*3/MM3 (ref 0–0.2)
BASOPHILS # BLD AUTO: 0.02 10*3/MM3 (ref 0–0.2)
BASOPHILS # BLD AUTO: 0.03 10*3/MM3 (ref 0–0.2)
BASOPHILS # BLD AUTO: 0.04 10*3/MM3 (ref 0–0.2)
BASOPHILS # BLD AUTO: 0.05 10*3/MM3 (ref 0–0.2)
BASOPHILS # BLD AUTO: 0.1 10*3/MM3 (ref 0–0.2)
BASOPHILS NFR BLD AUTO: 0 % (ref 0–1.5)
BASOPHILS NFR BLD AUTO: 0.2 % (ref 0–1.5)
BASOPHILS NFR BLD AUTO: 0.3 % (ref 0–1.5)
BASOPHILS NFR BLD AUTO: 0.4 % (ref 0–1.5)
BASOPHILS NFR BLD AUTO: 0.4 % (ref 0–1.5)
BASOPHILS NFR BLD AUTO: 0.6 % (ref 0–1.5)
BASOPHILS NFR BLD AUTO: 0.7 % (ref 0–1.5)
BASOPHILS NFR BLD AUTO: 0.8 % (ref 0–1.5)
BASOPHILS NFR BLD AUTO: 1.2 % (ref 0–1.5)
BASOPHILS NFR BLD AUTO: 2.2 % (ref 0–1.5)
BH BB BLOOD EXPIRATION DATE: NORMAL
BH BB BLOOD TYPE BARCODE: 5100
BH BB BLOOD TYPE BARCODE: 6200
BH BB BLOOD TYPE BARCODE: 7300
BH BB DISPENSE STATUS: NORMAL
BH BB PRODUCT CODE: NORMAL
BH BB UNIT NUMBER: NORMAL
BILIRUB SERPL-MCNC: 0.3 MG/DL (ref 0–1.2)
BILIRUB SERPL-MCNC: 0.4 MG/DL (ref 0–1.2)
BILIRUB SERPL-MCNC: 0.5 MG/DL (ref 0–1.2)
BILIRUB SERPL-MCNC: 0.6 MG/DL (ref 0–1.2)
BILIRUB SERPL-MCNC: 0.7 MG/DL (ref 0–1.2)
BLD GP AB SCN SERPL QL: POSITIVE
BUN SERPL-MCNC: 18 MG/DL (ref 8–23)
BUN SERPL-MCNC: 20 MG/DL (ref 8–23)
BUN SERPL-MCNC: 21 MG/DL (ref 8–23)
BUN SERPL-MCNC: 21 MG/DL (ref 8–23)
BUN SERPL-MCNC: 22 MG/DL (ref 8–23)
BUN SERPL-MCNC: 23 MG/DL (ref 8–23)
BUN SERPL-MCNC: 24 MG/DL (ref 8–23)
BUN SERPL-MCNC: 25 MG/DL (ref 8–23)
BUN SERPL-MCNC: 26 MG/DL (ref 8–23)
BUN SERPL-MCNC: 27 MG/DL (ref 8–23)
BUN SERPL-MCNC: 28 MG/DL (ref 8–23)
BUN SERPL-MCNC: 29 MG/DL (ref 8–23)
BUN SERPL-MCNC: 30 MG/DL (ref 8–23)
BUN SERPL-MCNC: 31 MG/DL (ref 8–23)
BUN SERPL-MCNC: 32 MG/DL (ref 8–23)
BUN SERPL-MCNC: 35 MG/DL (ref 8–23)
BUN SERPL-MCNC: 40 MG/DL (ref 8–23)
BUN SERPL-MCNC: 40 MG/DL (ref 8–23)
BUN SERPL-MCNC: 59 MG/DL (ref 8–23)
BUN SERPL-MCNC: 81 MG/DL (ref 8–23)
BUN/CREAT SERPL: 13.1 (ref 7–25)
BUN/CREAT SERPL: 13.2 (ref 7–25)
BUN/CREAT SERPL: 13.2 (ref 7–25)
BUN/CREAT SERPL: 13.4 (ref 7–25)
BUN/CREAT SERPL: 13.4 (ref 7–25)
BUN/CREAT SERPL: 14 (ref 7–25)
BUN/CREAT SERPL: 14.3 (ref 7–25)
BUN/CREAT SERPL: 14.8 (ref 7–25)
BUN/CREAT SERPL: 15.1 (ref 7–25)
BUN/CREAT SERPL: 15.4 (ref 7–25)
BUN/CREAT SERPL: 16 (ref 7–25)
BUN/CREAT SERPL: 17.3 (ref 7–25)
BUN/CREAT SERPL: 17.4 (ref 7–25)
BUN/CREAT SERPL: 17.7 (ref 7–25)
BUN/CREAT SERPL: 18 (ref 7–25)
BUN/CREAT SERPL: 18.1 (ref 7–25)
BUN/CREAT SERPL: 18.8 (ref 7–25)
BUN/CREAT SERPL: 18.9 (ref 7–25)
BUN/CREAT SERPL: 19.9 (ref 7–25)
BUN/CREAT SERPL: 20 (ref 7–25)
BUN/CREAT SERPL: 20 (ref 7–25)
BUN/CREAT SERPL: 20.1 (ref 7–25)
BUN/CREAT SERPL: 22.1 (ref 7–25)
CALCIUM SPEC-SCNC: 7.5 MG/DL (ref 8.6–10.5)
CALCIUM SPEC-SCNC: 8.2 MG/DL (ref 8.6–10.5)
CALCIUM SPEC-SCNC: 8.3 MG/DL (ref 8.6–10.5)
CALCIUM SPEC-SCNC: 8.4 MG/DL (ref 8.6–10.5)
CALCIUM SPEC-SCNC: 8.5 MG/DL (ref 8.6–10.5)
CALCIUM SPEC-SCNC: 8.5 MG/DL (ref 8.6–10.5)
CALCIUM SPEC-SCNC: 8.6 MG/DL (ref 8.6–10.5)
CALCIUM SPEC-SCNC: 8.6 MG/DL (ref 8.6–10.5)
CALCIUM SPEC-SCNC: 8.8 MG/DL (ref 8.6–10.5)
CALCIUM SPEC-SCNC: 8.9 MG/DL (ref 8.6–10.5)
CALCIUM SPEC-SCNC: 9 MG/DL (ref 8.6–10.5)
CALCIUM SPEC-SCNC: 9.1 MG/DL (ref 8.6–10.5)
CALCIUM SPEC-SCNC: 9.2 MG/DL (ref 8.6–10.5)
CALCIUM SPEC-SCNC: 9.3 MG/DL (ref 8.6–10.5)
CALCIUM SPEC-SCNC: 9.4 MG/DL (ref 8.6–10.5)
CALCIUM SPEC-SCNC: 9.5 MG/DL (ref 8.6–10.5)
CALCIUM SPEC-SCNC: 9.6 MG/DL (ref 8.6–10.5)
CALCIUM SPEC-SCNC: 9.6 MG/DL (ref 8.6–10.5)
CALCIUM SPEC-SCNC: 9.8 MG/DL (ref 8.6–10.5)
CALCIUM SPEC-SCNC: 9.8 MG/DL (ref 8.6–10.5)
CHLORIDE SERPL-SCNC: 100 MMOL/L (ref 98–107)
CHLORIDE SERPL-SCNC: 101 MMOL/L (ref 98–107)
CHLORIDE SERPL-SCNC: 102 MMOL/L (ref 98–107)
CHLORIDE SERPL-SCNC: 104 MMOL/L (ref 98–107)
CHLORIDE SERPL-SCNC: 94 MMOL/L (ref 98–107)
CHLORIDE SERPL-SCNC: 95 MMOL/L (ref 98–107)
CHLORIDE SERPL-SCNC: 95 MMOL/L (ref 98–107)
CHLORIDE SERPL-SCNC: 96 MMOL/L (ref 98–107)
CHLORIDE SERPL-SCNC: 97 MMOL/L (ref 98–107)
CHLORIDE SERPL-SCNC: 98 MMOL/L (ref 98–107)
CO2 SERPL-SCNC: 15.5 MMOL/L (ref 22–29)
CO2 SERPL-SCNC: 17 MMOL/L (ref 22–29)
CO2 SERPL-SCNC: 19.7 MMOL/L (ref 22–29)
CO2 SERPL-SCNC: 21.5 MMOL/L (ref 22–29)
CO2 SERPL-SCNC: 21.8 MMOL/L (ref 22–29)
CO2 SERPL-SCNC: 22 MMOL/L (ref 22–29)
CO2 SERPL-SCNC: 22 MMOL/L (ref 22–29)
CO2 SERPL-SCNC: 22.1 MMOL/L (ref 22–29)
CO2 SERPL-SCNC: 22.3 MMOL/L (ref 22–29)
CO2 SERPL-SCNC: 22.3 MMOL/L (ref 22–29)
CO2 SERPL-SCNC: 22.5 MMOL/L (ref 22–29)
CO2 SERPL-SCNC: 22.6 MMOL/L (ref 22–29)
CO2 SERPL-SCNC: 24.8 MMOL/L (ref 22–29)
CO2 SERPL-SCNC: 25 MMOL/L (ref 22–29)
CO2 SERPL-SCNC: 25.5 MMOL/L (ref 22–29)
CO2 SERPL-SCNC: 25.7 MMOL/L (ref 22–29)
CO2 SERPL-SCNC: 25.9 MMOL/L (ref 22–29)
CO2 SERPL-SCNC: 26.6 MMOL/L (ref 22–29)
CO2 SERPL-SCNC: 26.9 MMOL/L (ref 22–29)
CO2 SERPL-SCNC: 27.1 MMOL/L (ref 22–29)
CO2 SERPL-SCNC: 27.3 MMOL/L (ref 22–29)
CO2 SERPL-SCNC: 27.3 MMOL/L (ref 22–29)
CO2 SERPL-SCNC: 28.1 MMOL/L (ref 22–29)
CREAT SERPL-MCNC: 1.12 MG/DL (ref 0.76–1.27)
CREAT SERPL-MCNC: 1.27 MG/DL (ref 0.76–1.27)
CREAT SERPL-MCNC: 1.34 MG/DL (ref 0.76–1.27)
CREAT SERPL-MCNC: 1.43 MG/DL (ref 0.76–1.27)
CREAT SERPL-MCNC: 1.47 MG/DL (ref 0.76–1.27)
CREAT SERPL-MCNC: 1.49 MG/DL (ref 0.76–1.27)
CREAT SERPL-MCNC: 1.5 MG/DL (ref 0.76–1.27)
CREAT SERPL-MCNC: 1.51 MG/DL (ref 0.76–1.27)
CREAT SERPL-MCNC: 1.51 MG/DL (ref 0.76–1.27)
CREAT SERPL-MCNC: 1.52 MG/DL (ref 0.76–1.27)
CREAT SERPL-MCNC: 1.59 MG/DL (ref 0.76–1.27)
CREAT SERPL-MCNC: 1.62 MG/DL (ref 0.76–1.27)
CREAT SERPL-MCNC: 1.64 MG/DL (ref 0.76–1.27)
CREAT SERPL-MCNC: 1.64 MG/DL (ref 0.76–1.27)
CREAT SERPL-MCNC: 1.67 MG/DL (ref 0.76–1.27)
CREAT SERPL-MCNC: 1.73 MG/DL (ref 0.76–1.27)
CREAT SERPL-MCNC: 1.75 MG/DL (ref 0.76–1.27)
CREAT SERPL-MCNC: 1.81 MG/DL (ref 0.76–1.27)
CREAT SERPL-MCNC: 1.93 MG/DL (ref 0.76–1.27)
CREAT SERPL-MCNC: 2 MG/DL (ref 0.76–1.27)
CREAT SERPL-MCNC: 2.04 MG/DL (ref 0.76–1.27)
CREAT SERPL-MCNC: 3.27 MG/DL (ref 0.76–1.27)
CREAT SERPL-MCNC: 6.2 MG/DL (ref 0.76–1.27)
CROSSMATCH INTERPRETATION: NORMAL
CYTOLOGIST CVX/VAG CYTO: NORMAL
DEPRECATED RDW RBC AUTO: 52.2 FL (ref 37–54)
DEPRECATED RDW RBC AUTO: 52.2 FL (ref 37–54)
DEPRECATED RDW RBC AUTO: 53 FL (ref 37–54)
DEPRECATED RDW RBC AUTO: 53.1 FL (ref 37–54)
DEPRECATED RDW RBC AUTO: 53.5 FL (ref 37–54)
DEPRECATED RDW RBC AUTO: 54.2 FL (ref 37–54)
DEPRECATED RDW RBC AUTO: 55.9 FL (ref 37–54)
DEPRECATED RDW RBC AUTO: 57 FL (ref 37–54)
DEPRECATED RDW RBC AUTO: 60.6 FL (ref 37–54)
DEPRECATED RDW RBC AUTO: 63.3 FL (ref 37–54)
DEPRECATED RDW RBC AUTO: 63.7 FL (ref 37–54)
DEPRECATED RDW RBC AUTO: 64 FL (ref 37–54)
DEPRECATED RDW RBC AUTO: 64.1 FL (ref 37–54)
DEPRECATED RDW RBC AUTO: 64.2 FL (ref 37–54)
DEPRECATED RDW RBC AUTO: 66.1 FL (ref 37–54)
DEPRECATED RDW RBC AUTO: 66.7 FL (ref 37–54)
DEPRECATED RDW RBC AUTO: 67.4 FL (ref 37–54)
DEPRECATED RDW RBC AUTO: 67.9 FL (ref 37–54)
DEPRECATED RDW RBC AUTO: 67.9 FL (ref 37–54)
DEPRECATED RDW RBC AUTO: 68.7 FL (ref 37–54)
DEPRECATED RDW RBC AUTO: 71.2 FL (ref 37–54)
DEPRECATED RDW RBC AUTO: 71.9 FL (ref 37–54)
DEPRECATED RDW RBC AUTO: 72.4 FL (ref 37–54)
DEPRECATED RDW RBC AUTO: 73.5 FL (ref 37–54)
DEPRECATED RDW RBC AUTO: 74 FL (ref 37–54)
DEPRECATED RDW RBC AUTO: 75.5 FL (ref 37–54)
DEPRECATED RDW RBC AUTO: 75.7 FL (ref 37–54)
DEPRECATED RDW RBC AUTO: 75.9 FL (ref 37–54)
DEPRECATED RDW RBC AUTO: 77 FL (ref 37–54)
DEPRECATED RDW RBC AUTO: 77.5 FL (ref 37–54)
DEPRECATED RDW RBC AUTO: 77.5 FL (ref 37–54)
DEPRECATED RDW RBC AUTO: 77.8 FL (ref 37–54)
DEPRECATED RDW RBC AUTO: 79.9 FL (ref 37–54)
EOSINOPHIL # BLD AUTO: 0 10*3/MM3 (ref 0–0.4)
EOSINOPHIL # BLD AUTO: 0.01 10*3/MM3 (ref 0–0.4)
EOSINOPHIL # BLD AUTO: 0.02 10*3/MM3 (ref 0–0.4)
EOSINOPHIL # BLD AUTO: 0.02 10*3/MM3 (ref 0–0.4)
EOSINOPHIL # BLD AUTO: 0.03 10*3/MM3 (ref 0–0.4)
EOSINOPHIL # BLD AUTO: 0.04 10*3/MM3 (ref 0–0.4)
EOSINOPHIL # BLD AUTO: 0.04 10*3/MM3 (ref 0–0.4)
EOSINOPHIL # BLD AUTO: 0.06 10*3/MM3 (ref 0–0.4)
EOSINOPHIL # BLD AUTO: 0.07 10*3/MM3 (ref 0–0.4)
EOSINOPHIL # BLD AUTO: 0.08 10*3/MM3 (ref 0–0.4)
EOSINOPHIL # BLD AUTO: 0.11 10*3/MM3 (ref 0–0.4)
EOSINOPHIL # BLD AUTO: 0.11 10*3/MM3 (ref 0–0.4)
EOSINOPHIL # BLD AUTO: 0.16 10*3/MM3 (ref 0–0.4)
EOSINOPHIL # BLD AUTO: 0.17 10*3/MM3 (ref 0–0.4)
EOSINOPHIL # BLD AUTO: 0.23 10*3/MM3 (ref 0–0.4)
EOSINOPHIL # BLD MANUAL: 0.16 10*3/MM3 (ref 0–0.4)
EOSINOPHIL NFR BLD AUTO: 0 % (ref 0.3–6.2)
EOSINOPHIL NFR BLD AUTO: 0.2 % (ref 0.3–6.2)
EOSINOPHIL NFR BLD AUTO: 0.2 % (ref 0.3–6.2)
EOSINOPHIL NFR BLD AUTO: 0.3 % (ref 0.3–6.2)
EOSINOPHIL NFR BLD AUTO: 0.3 % (ref 0.3–6.2)
EOSINOPHIL NFR BLD AUTO: 0.5 % (ref 0.3–6.2)
EOSINOPHIL NFR BLD AUTO: 0.6 % (ref 0.3–6.2)
EOSINOPHIL NFR BLD AUTO: 0.7 % (ref 0.3–6.2)
EOSINOPHIL NFR BLD AUTO: 0.7 % (ref 0.3–6.2)
EOSINOPHIL NFR BLD AUTO: 0.8 % (ref 0.3–6.2)
EOSINOPHIL NFR BLD AUTO: 0.9 % (ref 0.3–6.2)
EOSINOPHIL NFR BLD AUTO: 1.1 % (ref 0.3–6.2)
EOSINOPHIL NFR BLD AUTO: 1.1 % (ref 0.3–6.2)
EOSINOPHIL NFR BLD AUTO: 1.3 % (ref 0.3–6.2)
EOSINOPHIL NFR BLD AUTO: 1.4 % (ref 0.3–6.2)
EOSINOPHIL NFR BLD AUTO: 1.5 % (ref 0.3–6.2)
EOSINOPHIL NFR BLD AUTO: 1.6 % (ref 0.3–6.2)
EOSINOPHIL NFR BLD AUTO: 1.8 % (ref 0.3–6.2)
EOSINOPHIL NFR BLD AUTO: 1.9 % (ref 0.3–6.2)
EOSINOPHIL NFR BLD AUTO: 2 % (ref 0.3–6.2)
EOSINOPHIL NFR BLD AUTO: 2.1 % (ref 0.3–6.2)
EOSINOPHIL NFR BLD AUTO: 2.1 % (ref 0.3–6.2)
EOSINOPHIL NFR BLD AUTO: 2.4 % (ref 0.3–6.2)
EOSINOPHIL NFR BLD AUTO: 3.6 % (ref 0.3–6.2)
EOSINOPHIL NFR BLD AUTO: 4.8 % (ref 0.3–6.2)
EOSINOPHIL NFR BLD MANUAL: 4 % (ref 0.3–6.2)
ERYTHROCYTE [DISTWIDTH] IN BLOOD BY AUTOMATED COUNT: 14.8 % (ref 12.3–15.4)
ERYTHROCYTE [DISTWIDTH] IN BLOOD BY AUTOMATED COUNT: 14.9 % (ref 12.3–15.4)
ERYTHROCYTE [DISTWIDTH] IN BLOOD BY AUTOMATED COUNT: 15 % (ref 12.3–15.4)
ERYTHROCYTE [DISTWIDTH] IN BLOOD BY AUTOMATED COUNT: 15.2 % (ref 12.3–15.4)
ERYTHROCYTE [DISTWIDTH] IN BLOOD BY AUTOMATED COUNT: 15.2 % (ref 12.3–15.4)
ERYTHROCYTE [DISTWIDTH] IN BLOOD BY AUTOMATED COUNT: 15.3 % (ref 12.3–15.4)
ERYTHROCYTE [DISTWIDTH] IN BLOOD BY AUTOMATED COUNT: 15.3 % (ref 12.3–15.4)
ERYTHROCYTE [DISTWIDTH] IN BLOOD BY AUTOMATED COUNT: 15.4 % (ref 12.3–15.4)
ERYTHROCYTE [DISTWIDTH] IN BLOOD BY AUTOMATED COUNT: 15.9 % (ref 12.3–15.4)
ERYTHROCYTE [DISTWIDTH] IN BLOOD BY AUTOMATED COUNT: 16.1 % (ref 12.3–15.4)
ERYTHROCYTE [DISTWIDTH] IN BLOOD BY AUTOMATED COUNT: 16.3 % (ref 12.3–15.4)
ERYTHROCYTE [DISTWIDTH] IN BLOOD BY AUTOMATED COUNT: 16.4 % (ref 12.3–15.4)
ERYTHROCYTE [DISTWIDTH] IN BLOOD BY AUTOMATED COUNT: 16.5 % (ref 12.3–15.4)
ERYTHROCYTE [DISTWIDTH] IN BLOOD BY AUTOMATED COUNT: 16.5 % (ref 12.3–15.4)
ERYTHROCYTE [DISTWIDTH] IN BLOOD BY AUTOMATED COUNT: 16.9 % (ref 12.3–15.4)
ERYTHROCYTE [DISTWIDTH] IN BLOOD BY AUTOMATED COUNT: 17.7 % (ref 12.3–15.4)
ERYTHROCYTE [DISTWIDTH] IN BLOOD BY AUTOMATED COUNT: 17.7 % (ref 12.3–15.4)
ERYTHROCYTE [DISTWIDTH] IN BLOOD BY AUTOMATED COUNT: 18 % (ref 12.3–15.4)
ERYTHROCYTE [DISTWIDTH] IN BLOOD BY AUTOMATED COUNT: 18 % (ref 12.3–15.4)
ERYTHROCYTE [DISTWIDTH] IN BLOOD BY AUTOMATED COUNT: 18.6 % (ref 12.3–15.4)
ERYTHROCYTE [DISTWIDTH] IN BLOOD BY AUTOMATED COUNT: 18.9 % (ref 12.3–15.4)
ERYTHROCYTE [DISTWIDTH] IN BLOOD BY AUTOMATED COUNT: 19.3 % (ref 12.3–15.4)
ERYTHROCYTE [DISTWIDTH] IN BLOOD BY AUTOMATED COUNT: 19.5 % (ref 12.3–15.4)
ERYTHROCYTE [DISTWIDTH] IN BLOOD BY AUTOMATED COUNT: 19.5 % (ref 12.3–15.4)
ERYTHROCYTE [DISTWIDTH] IN BLOOD BY AUTOMATED COUNT: 19.7 % (ref 12.3–15.4)
ERYTHROCYTE [DISTWIDTH] IN BLOOD BY AUTOMATED COUNT: 20 % (ref 12.3–15.4)
ERYTHROCYTE [DISTWIDTH] IN BLOOD BY AUTOMATED COUNT: 20.1 % (ref 12.3–15.4)
ERYTHROCYTE [DISTWIDTH] IN BLOOD BY AUTOMATED COUNT: 20.4 % (ref 12.3–15.4)
ERYTHROCYTE [DISTWIDTH] IN BLOOD BY AUTOMATED COUNT: 20.5 % (ref 12.3–15.4)
ERYTHROCYTE [DISTWIDTH] IN BLOOD BY AUTOMATED COUNT: 20.6 % (ref 12.3–15.4)
ERYTHROCYTE [DISTWIDTH] IN BLOOD BY AUTOMATED COUNT: 21 % (ref 12.3–15.4)
GAMMA GLOB SERPL ELPH-MCNC: 0.2 G/DL (ref 0.4–1.8)
GAMMA GLOB SERPL ELPH-MCNC: 0.3 G/DL (ref 0.4–1.8)
GAMMA GLOB SERPL ELPH-MCNC: 0.4 G/DL (ref 0.4–1.8)
GAMMA GLOB SERPL ELPH-MCNC: 0.4 G/DL (ref 0.4–1.8)
GFR SERPL CREATININE-BSD FRML MDRD: 19 ML/MIN/1.73
GFR SERPL CREATININE-BSD FRML MDRD: 33 ML/MIN/1.73
GFR SERPL CREATININE-BSD FRML MDRD: 33 ML/MIN/1.73
GFR SERPL CREATININE-BSD FRML MDRD: 35 ML/MIN/1.73
GFR SERPL CREATININE-BSD FRML MDRD: 38 ML/MIN/1.73
GFR SERPL CREATININE-BSD FRML MDRD: 39 ML/MIN/1.73
GFR SERPL CREATININE-BSD FRML MDRD: 40 ML/MIN/1.73
GFR SERPL CREATININE-BSD FRML MDRD: 41 ML/MIN/1.73
GFR SERPL CREATININE-BSD FRML MDRD: 42 ML/MIN/1.73
GFR SERPL CREATININE-BSD FRML MDRD: 42 ML/MIN/1.73
GFR SERPL CREATININE-BSD FRML MDRD: 43 ML/MIN/1.73
GFR SERPL CREATININE-BSD FRML MDRD: 44 ML/MIN/1.73
GFR SERPL CREATININE-BSD FRML MDRD: 46 ML/MIN/1.73
GFR SERPL CREATININE-BSD FRML MDRD: 47 ML/MIN/1.73
GFR SERPL CREATININE-BSD FRML MDRD: 48 ML/MIN/1.73
GFR SERPL CREATININE-BSD FRML MDRD: 49 ML/MIN/1.73
GFR SERPL CREATININE-BSD FRML MDRD: 53 ML/MIN/1.73
GFR SERPL CREATININE-BSD FRML MDRD: 57 ML/MIN/1.73
GFR SERPL CREATININE-BSD FRML MDRD: 65 ML/MIN/1.73
GFR SERPL CREATININE-BSD FRML MDRD: 9 ML/MIN/1.73
GFR SERPL CREATININE-BSD FRML MDRD: ABNORMAL ML/MIN/{1.73_M2}
GLOBULIN SER CALC-MCNC: 2.8 G/DL (ref 2.2–3.9)
GLOBULIN SER-MCNC: 2.9 G/DL (ref 2.2–3.9)
GLOBULIN SER-MCNC: 3 G/DL (ref 2.2–3.9)
GLOBULIN SER-MCNC: 3.2 G/DL (ref 2.2–3.9)
GLOBULIN SER-MCNC: 3.2 G/DL (ref 2.2–3.9)
GLOBULIN SER-MCNC: 3.3 G/DL (ref 2.2–3.9)
GLOBULIN SER-MCNC: 3.4 G/DL (ref 2.2–3.9)
GLOBULIN SER-MCNC: 3.4 G/DL (ref 2.2–3.9)
GLOBULIN SER-MCNC: 3.7 G/DL (ref 2.2–3.9)
GLOBULIN SER-MCNC: 4.1 G/DL (ref 2.2–3.9)
GLOBULIN SER-MCNC: 4.4 G/DL (ref 2.2–3.9)
GLOBULIN UR ELPH-MCNC: 2.3 GM/DL
GLOBULIN UR ELPH-MCNC: 2.4 GM/DL
GLOBULIN UR ELPH-MCNC: 2.4 GM/DL
GLOBULIN UR ELPH-MCNC: 2.5 GM/DL
GLOBULIN UR ELPH-MCNC: 2.6 GM/DL
GLOBULIN UR ELPH-MCNC: 2.7 GM/DL
GLOBULIN UR ELPH-MCNC: 2.8 GM/DL
GLOBULIN UR ELPH-MCNC: 3 GM/DL
GLOBULIN UR ELPH-MCNC: 3.1 GM/DL
GLOBULIN UR ELPH-MCNC: 3.2 GM/DL
GLOBULIN UR ELPH-MCNC: 3.3 GM/DL
GLOBULIN UR ELPH-MCNC: 3.3 GM/DL
GLOBULIN UR ELPH-MCNC: 3.5 GM/DL
GLOBULIN UR ELPH-MCNC: 3.7 GM/DL
GLOBULIN UR ELPH-MCNC: 4 GM/DL
GLOBULIN UR ELPH-MCNC: 4.1 GM/DL
GLOBULIN UR ELPH-MCNC: 4.4 GM/DL
GLOBULIN UR ELPH-MCNC: 4.4 GM/DL
GLOBULIN UR ELPH-MCNC: 4.6 GM/DL
GLOBULIN UR ELPH-MCNC: 4.7 GM/DL
GLOBULIN UR ELPH-MCNC: 4.7 GM/DL
GLUCOSE SERPL-MCNC: 110 MG/DL (ref 65–99)
GLUCOSE SERPL-MCNC: 128 MG/DL (ref 65–99)
GLUCOSE SERPL-MCNC: 138 MG/DL (ref 65–99)
GLUCOSE SERPL-MCNC: 148 MG/DL (ref 65–99)
GLUCOSE SERPL-MCNC: 156 MG/DL (ref 65–99)
GLUCOSE SERPL-MCNC: 160 MG/DL (ref 65–99)
GLUCOSE SERPL-MCNC: 175 MG/DL (ref 65–99)
GLUCOSE SERPL-MCNC: 176 MG/DL (ref 65–99)
GLUCOSE SERPL-MCNC: 178 MG/DL (ref 65–99)
GLUCOSE SERPL-MCNC: 183 MG/DL (ref 65–99)
GLUCOSE SERPL-MCNC: 184 MG/DL (ref 65–99)
GLUCOSE SERPL-MCNC: 186 MG/DL (ref 65–99)
GLUCOSE SERPL-MCNC: 187 MG/DL (ref 65–99)
GLUCOSE SERPL-MCNC: 188 MG/DL (ref 65–99)
GLUCOSE SERPL-MCNC: 192 MG/DL (ref 65–99)
GLUCOSE SERPL-MCNC: 198 MG/DL (ref 65–99)
GLUCOSE SERPL-MCNC: 203 MG/DL (ref 65–99)
GLUCOSE SERPL-MCNC: 207 MG/DL (ref 65–99)
GLUCOSE SERPL-MCNC: 213 MG/DL (ref 65–99)
GLUCOSE SERPL-MCNC: 216 MG/DL (ref 65–99)
GLUCOSE SERPL-MCNC: 217 MG/DL (ref 65–99)
GLUCOSE SERPL-MCNC: 230 MG/DL (ref 65–99)
GLUCOSE SERPL-MCNC: 82 MG/DL (ref 65–99)
GLUCOSE SERPL-MCNC: 85 MG/DL (ref 65–99)
GLUCOSE SERPL-MCNC: 89 MG/DL (ref 65–99)
HBA1C MFR BLD: 6.1 %
HCT VFR BLD AUTO: 18.4 % (ref 37.5–51)
HCT VFR BLD AUTO: 21 % (ref 37.5–51)
HCT VFR BLD AUTO: 21.7 % (ref 37.5–51)
HCT VFR BLD AUTO: 22 % (ref 37.5–51)
HCT VFR BLD AUTO: 23.2 % (ref 37.5–51)
HCT VFR BLD AUTO: 24.1 % (ref 37.5–51)
HCT VFR BLD AUTO: 24.3 % (ref 37.5–51)
HCT VFR BLD AUTO: 24.5 % (ref 37.5–51)
HCT VFR BLD AUTO: 24.8 % (ref 37.5–51)
HCT VFR BLD AUTO: 24.9 % (ref 37.5–51)
HCT VFR BLD AUTO: 25.2 % (ref 37.5–51)
HCT VFR BLD AUTO: 25.3 % (ref 37.5–51)
HCT VFR BLD AUTO: 25.7 % (ref 37.5–51)
HCT VFR BLD AUTO: 25.7 % (ref 37.5–51)
HCT VFR BLD AUTO: 25.8 % (ref 37.5–51)
HCT VFR BLD AUTO: 25.9 % (ref 37.5–51)
HCT VFR BLD AUTO: 25.9 % (ref 37.5–51)
HCT VFR BLD AUTO: 26.3 % (ref 37.5–51)
HCT VFR BLD AUTO: 26.3 % (ref 37.5–51)
HCT VFR BLD AUTO: 26.5 % (ref 37.5–51)
HCT VFR BLD AUTO: 27.7 % (ref 37.5–51)
HCT VFR BLD AUTO: 27.8 % (ref 37.5–51)
HCT VFR BLD AUTO: 28 % (ref 37.5–51)
HCT VFR BLD AUTO: 28.5 % (ref 37.5–51)
HCT VFR BLD AUTO: 29.2 % (ref 37.5–51)
HCT VFR BLD AUTO: 31.3 % (ref 37.5–51)
HCT VFR BLD AUTO: 32.1 % (ref 37.5–51)
HGB BLD-MCNC: 10.3 G/DL (ref 13–17.7)
HGB BLD-MCNC: 10.4 G/DL (ref 13–17.7)
HGB BLD-MCNC: 6 G/DL (ref 13–17.7)
HGB BLD-MCNC: 6.8 G/DL (ref 13–17.7)
HGB BLD-MCNC: 7 G/DL (ref 13–17.7)
HGB BLD-MCNC: 7 G/DL (ref 13–17.7)
HGB BLD-MCNC: 7.3 G/DL (ref 13–17.7)
HGB BLD-MCNC: 7.7 G/DL (ref 13–17.7)
HGB BLD-MCNC: 7.9 G/DL (ref 13–17.7)
HGB BLD-MCNC: 8 G/DL (ref 13–17.7)
HGB BLD-MCNC: 8 G/DL (ref 13–17.7)
HGB BLD-MCNC: 8.1 G/DL (ref 13–17.7)
HGB BLD-MCNC: 8.2 G/DL (ref 13–17.7)
HGB BLD-MCNC: 8.3 G/DL (ref 13–17.7)
HGB BLD-MCNC: 8.5 G/DL (ref 13–17.7)
HGB BLD-MCNC: 8.6 G/DL (ref 13–17.7)
HGB BLD-MCNC: 8.7 G/DL (ref 13–17.7)
HGB BLD-MCNC: 8.7 G/DL (ref 13–17.7)
HGB BLD-MCNC: 8.9 G/DL (ref 13–17.7)
HGB BLD-MCNC: 9 G/DL (ref 13–17.7)
HGB BLD-MCNC: 9.1 G/DL (ref 13–17.7)
HGB BLD-MCNC: 9.1 G/DL (ref 13–17.7)
HGB BLD-MCNC: 9.2 G/DL (ref 13–17.7)
HGB BLD-MCNC: 9.3 G/DL (ref 13–17.7)
HYPOCHROMIA BLD QL: ABNORMAL
HYPOCHROMIA BLD QL: NORMAL
HYPOCHROMIA BLD QL: NORMAL
IGA SERPL-MCNC: 1075 MG/DL (ref 61–437)
IGA SERPL-MCNC: 1152 MG/DL (ref 61–437)
IGA SERPL-MCNC: 1340 MG/DL (ref 61–437)
IGA SERPL-MCNC: 1360 MG/DL (ref 61–437)
IGA SERPL-MCNC: 2006 MG/DL (ref 61–437)
IGA SERPL-MCNC: 2797 MG/DL (ref 61–437)
IGA SERPL-MCNC: 533 MG/DL (ref 61–437)
IGA SERPL-MCNC: 546 MG/DL (ref 61–437)
IGA SERPL-MCNC: 682 MG/DL (ref 61–437)
IGA SERPL-MCNC: 740 MG/DL (ref 61–437)
IGG SERPL-MCNC: 210 MG/DL (ref 603–1613)
IGG SERPL-MCNC: 257 MG/DL (ref 603–1613)
IGG SERPL-MCNC: 285 MG/DL (ref 603–1613)
IGG SERPL-MCNC: 305 MG/DL (ref 603–1613)
IGG SERPL-MCNC: 310 MG/DL (ref 603–1613)
IGG SERPL-MCNC: 356 MG/DL (ref 603–1613)
IGG SERPL-MCNC: 378 MG/DL (ref 603–1613)
IGG SERPL-MCNC: 386 MG/DL (ref 603–1613)
IGG SERPL-MCNC: 407 MG/DL (ref 603–1613)
IGG SERPL-MCNC: 413 MG/DL (ref 603–1613)
IGM SERPL-MCNC: 5 MG/DL (ref 20–172)
IGM SERPL-MCNC: 6 MG/DL (ref 20–172)
IGM SERPL-MCNC: 7 MG/DL (ref 20–172)
IGM SERPL-MCNC: 9 MG/DL (ref 20–172)
IGM SERPL-MCNC: <5 MG/DL (ref 20–172)
IMM GRANULOCYTES # BLD AUTO: 0 10*3/MM3 (ref 0–0.05)
IMM GRANULOCYTES # BLD AUTO: 0.01 10*3/MM3 (ref 0–0.05)
IMM GRANULOCYTES # BLD AUTO: 0.02 10*3/MM3 (ref 0–0.05)
IMM GRANULOCYTES # BLD AUTO: 0.03 10*3/MM3 (ref 0–0.05)
IMM GRANULOCYTES # BLD AUTO: 0.04 10*3/MM3 (ref 0–0.05)
IMM GRANULOCYTES # BLD AUTO: 0.04 10*3/MM3 (ref 0–0.05)
IMM GRANULOCYTES # BLD AUTO: 0.05 10*3/MM3 (ref 0–0.05)
IMM GRANULOCYTES # BLD AUTO: 0.06 10*3/MM3 (ref 0–0.05)
IMM GRANULOCYTES # BLD AUTO: 0.06 10*3/MM3 (ref 0–0.05)
IMM GRANULOCYTES # BLD AUTO: 0.09 10*3/MM3 (ref 0–0.05)
IMM GRANULOCYTES # BLD AUTO: 0.1 10*3/MM3 (ref 0–0.05)
IMM GRANULOCYTES # BLD AUTO: 0.29 10*3/MM3 (ref 0–0.05)
IMM GRANULOCYTES NFR BLD AUTO: 0 % (ref 0–0.5)
IMM GRANULOCYTES NFR BLD AUTO: 0.2 % (ref 0–0.5)
IMM GRANULOCYTES NFR BLD AUTO: 0.3 % (ref 0–0.5)
IMM GRANULOCYTES NFR BLD AUTO: 0.3 % (ref 0–0.5)
IMM GRANULOCYTES NFR BLD AUTO: 0.4 % (ref 0–0.5)
IMM GRANULOCYTES NFR BLD AUTO: 0.5 % (ref 0–0.5)
IMM GRANULOCYTES NFR BLD AUTO: 0.6 % (ref 0–0.5)
IMM GRANULOCYTES NFR BLD AUTO: 0.7 % (ref 0–0.5)
IMM GRANULOCYTES NFR BLD AUTO: 0.7 % (ref 0–0.5)
IMM GRANULOCYTES NFR BLD AUTO: 1 % (ref 0–0.5)
IMM GRANULOCYTES NFR BLD AUTO: 1.1 % (ref 0–0.5)
IMM GRANULOCYTES NFR BLD AUTO: 1.5 % (ref 0–0.5)
IMM GRANULOCYTES NFR BLD AUTO: 2.3 % (ref 0–0.5)
IMM GRANULOCYTES NFR BLD AUTO: 2.5 % (ref 0–0.5)
IMM GRANULOCYTES NFR BLD AUTO: 7.2 % (ref 0–0.5)
INTERPRETATION SERPL IEP-IMP: ABNORMAL
KAPPA LC FREE SER-MCNC: 10.8 MG/L (ref 3.3–19.4)
KAPPA LC FREE SER-MCNC: 2.2 MG/L (ref 3.3–19.4)
KAPPA LC FREE SER-MCNC: 2.3 MG/L (ref 3.3–19.4)
KAPPA LC FREE SER-MCNC: 3.7 MG/L (ref 3.3–19.4)
KAPPA LC FREE SER-MCNC: 3.9 MG/L (ref 3.3–19.4)
KAPPA LC FREE SER-MCNC: 5.1 MG/L (ref 3.3–19.4)
KAPPA LC FREE SER-MCNC: 5.5 MG/L (ref 3.3–19.4)
KAPPA LC FREE SER-MCNC: 5.9 MG/L (ref 3.3–19.4)
KAPPA LC FREE SER-MCNC: 6.8 MG/L (ref 3.3–19.4)
KAPPA LC FREE SER-MCNC: 7 MG/L (ref 3.3–19.4)
KAPPA LC FREE/LAMBDA FREE SER: 0 {RATIO} (ref 0.26–1.65)
KAPPA LC FREE/LAMBDA FREE SER: 0 {RATIO} (ref 0.26–1.65)
KAPPA LC FREE/LAMBDA FREE SER: 0.01 {RATIO} (ref 0.26–1.65)
KAPPA LC FREE/LAMBDA FREE SER: 0.02 {RATIO} (ref 0.26–1.65)
KAPPA LC FREE/LAMBDA FREE SER: 0.02 {RATIO} (ref 0.26–1.65)
KAPPA LC FREE/LAMBDA FREE SER: 0.03 {RATIO} (ref 0.26–1.65)
KAPPA LC FREE/LAMBDA FREE SER: 0.03 {RATIO} (ref 0.26–1.65)
KAPPA LC FREE/LAMBDA FREE SER: 0.04 {RATIO} (ref 0.26–1.65)
LABORATORY COMMENT REPORT: ABNORMAL
LAMBDA LC FREE SERPL-MCNC: 142 MG/L (ref 5.7–26.3)
LAMBDA LC FREE SERPL-MCNC: 212.5 MG/L (ref 5.7–26.3)
LAMBDA LC FREE SERPL-MCNC: 241.4 MG/L (ref 5.7–26.3)
LAMBDA LC FREE SERPL-MCNC: 267.3 MG/L (ref 5.7–26.3)
LAMBDA LC FREE SERPL-MCNC: 297.3 MG/L (ref 5.7–26.3)
LAMBDA LC FREE SERPL-MCNC: 3075.7 MG/L (ref 5.7–26.3)
LAMBDA LC FREE SERPL-MCNC: 379.5 MG/L (ref 5.7–26.3)
LAMBDA LC FREE SERPL-MCNC: 380.2 MG/L (ref 5.7–26.3)
LAMBDA LC FREE SERPL-MCNC: 454 MG/L (ref 5.7–26.3)
LAMBDA LC FREE SERPL-MCNC: 570.4 MG/L (ref 5.7–26.3)
LYMPHOCYTES # BLD AUTO: 0.98 10*3/MM3 (ref 0.7–3.1)
LYMPHOCYTES # BLD AUTO: 1.06 10*3/MM3 (ref 0.7–3.1)
LYMPHOCYTES # BLD AUTO: 1.13 10*3/MM3 (ref 0.7–3.1)
LYMPHOCYTES # BLD AUTO: 1.14 10*3/MM3 (ref 0.7–3.1)
LYMPHOCYTES # BLD AUTO: 1.3 10*3/MM3 (ref 0.7–3.1)
LYMPHOCYTES # BLD AUTO: 1.31 10*3/MM3 (ref 0.7–3.1)
LYMPHOCYTES # BLD AUTO: 1.32 10*3/MM3 (ref 0.7–3.1)
LYMPHOCYTES # BLD AUTO: 1.32 10*3/MM3 (ref 0.7–3.1)
LYMPHOCYTES # BLD AUTO: 1.33 10*3/MM3 (ref 0.7–3.1)
LYMPHOCYTES # BLD AUTO: 1.35 10*3/MM3 (ref 0.7–3.1)
LYMPHOCYTES # BLD AUTO: 1.41 10*3/MM3 (ref 0.7–3.1)
LYMPHOCYTES # BLD AUTO: 1.45 10*3/MM3 (ref 0.7–3.1)
LYMPHOCYTES # BLD AUTO: 1.46 10*3/MM3 (ref 0.7–3.1)
LYMPHOCYTES # BLD AUTO: 1.47 10*3/MM3 (ref 0.7–3.1)
LYMPHOCYTES # BLD AUTO: 1.47 10*3/MM3 (ref 0.7–3.1)
LYMPHOCYTES # BLD AUTO: 1.54 10*3/MM3 (ref 0.7–3.1)
LYMPHOCYTES # BLD AUTO: 1.59 10*3/MM3 (ref 0.7–3.1)
LYMPHOCYTES # BLD AUTO: 1.72 10*3/MM3 (ref 0.7–3.1)
LYMPHOCYTES # BLD AUTO: 1.74 10*3/MM3 (ref 0.7–3.1)
LYMPHOCYTES # BLD AUTO: 1.75 10*3/MM3 (ref 0.7–3.1)
LYMPHOCYTES # BLD AUTO: 1.78 10*3/MM3 (ref 0.7–3.1)
LYMPHOCYTES # BLD AUTO: 1.96 10*3/MM3 (ref 0.7–3.1)
LYMPHOCYTES # BLD AUTO: 1.99 10*3/MM3 (ref 0.7–3.1)
LYMPHOCYTES # BLD AUTO: 2.07 10*3/MM3 (ref 0.7–3.1)
LYMPHOCYTES # BLD AUTO: 2.17 10*3/MM3 (ref 0.7–3.1)
LYMPHOCYTES # BLD AUTO: 2.21 10*3/MM3 (ref 0.7–3.1)
LYMPHOCYTES # BLD AUTO: 2.3 10*3/MM3 (ref 0.7–3.1)
LYMPHOCYTES # BLD AUTO: 2.33 10*3/MM3 (ref 0.7–3.1)
LYMPHOCYTES # BLD AUTO: 2.39 10*3/MM3 (ref 0.7–3.1)
LYMPHOCYTES # BLD AUTO: 2.56 10*3/MM3 (ref 0.7–3.1)
LYMPHOCYTES # BLD MANUAL: 1.51 10*3/MM3 (ref 0.7–3.1)
LYMPHOCYTES # BLD MANUAL: 1.98 10*3/MM3 (ref 0.7–3.1)
LYMPHOCYTES # BLD MANUAL: 2.44 10*3/MM3 (ref 0.7–3.1)
LYMPHOCYTES NFR BLD AUTO: 19.5 % (ref 19.6–45.3)
LYMPHOCYTES NFR BLD AUTO: 20.2 % (ref 19.6–45.3)
LYMPHOCYTES NFR BLD AUTO: 20.7 % (ref 19.6–45.3)
LYMPHOCYTES NFR BLD AUTO: 24 % (ref 19.6–45.3)
LYMPHOCYTES NFR BLD AUTO: 25.1 % (ref 19.6–45.3)
LYMPHOCYTES NFR BLD AUTO: 27.6 % (ref 19.6–45.3)
LYMPHOCYTES NFR BLD AUTO: 27.7 % (ref 19.6–45.3)
LYMPHOCYTES NFR BLD AUTO: 29.3 % (ref 19.6–45.3)
LYMPHOCYTES NFR BLD AUTO: 30.6 % (ref 19.6–45.3)
LYMPHOCYTES NFR BLD AUTO: 30.9 % (ref 19.6–45.3)
LYMPHOCYTES NFR BLD AUTO: 31.5 % (ref 19.6–45.3)
LYMPHOCYTES NFR BLD AUTO: 31.8 % (ref 19.6–45.3)
LYMPHOCYTES NFR BLD AUTO: 31.9 % (ref 19.6–45.3)
LYMPHOCYTES NFR BLD AUTO: 36.3 % (ref 19.6–45.3)
LYMPHOCYTES NFR BLD AUTO: 36.5 % (ref 19.6–45.3)
LYMPHOCYTES NFR BLD AUTO: 36.5 % (ref 19.6–45.3)
LYMPHOCYTES NFR BLD AUTO: 38.1 % (ref 19.6–45.3)
LYMPHOCYTES NFR BLD AUTO: 40.6 % (ref 19.6–45.3)
LYMPHOCYTES NFR BLD AUTO: 42.1 % (ref 19.6–45.3)
LYMPHOCYTES NFR BLD AUTO: 43.2 % (ref 19.6–45.3)
LYMPHOCYTES NFR BLD AUTO: 47 % (ref 19.6–45.3)
LYMPHOCYTES NFR BLD AUTO: 47 % (ref 19.6–45.3)
LYMPHOCYTES NFR BLD AUTO: 47.6 % (ref 19.6–45.3)
LYMPHOCYTES NFR BLD AUTO: 47.8 % (ref 19.6–45.3)
LYMPHOCYTES NFR BLD AUTO: 48.3 % (ref 19.6–45.3)
LYMPHOCYTES NFR BLD AUTO: 48.6 % (ref 19.6–45.3)
LYMPHOCYTES NFR BLD AUTO: 49.7 % (ref 19.6–45.3)
LYMPHOCYTES NFR BLD AUTO: 51 % (ref 19.6–45.3)
LYMPHOCYTES NFR BLD AUTO: 52.8 % (ref 19.6–45.3)
LYMPHOCYTES NFR BLD AUTO: 53.6 % (ref 19.6–45.3)
LYMPHOCYTES NFR BLD MANUAL: 10 % (ref 5–12)
LYMPHOCYTES NFR BLD MANUAL: 46 % (ref 19.6–45.3)
LYMPHOCYTES NFR BLD MANUAL: 49 % (ref 19.6–45.3)
LYMPHOCYTES NFR BLD MANUAL: 53 % (ref 19.6–45.3)
LYMPHOCYTES NFR BLD MANUAL: 8 % (ref 5–12)
LYMPHOCYTES NFR BLD MANUAL: 9 % (ref 5–12)
M PROTEIN SERPL ELPH-MCNC: ABNORMAL G/DL
MACROCYTES BLD QL SMEAR: ABNORMAL
MACROCYTES BLD QL SMEAR: NORMAL
MAGNESIUM SERPL-MCNC: 1.4 MG/DL (ref 1.6–2.4)
MAGNESIUM SERPL-MCNC: 1.5 MG/DL (ref 1.6–2.4)
MAGNESIUM SERPL-MCNC: 1.6 MG/DL (ref 1.6–2.4)
MAGNESIUM SERPL-MCNC: 1.7 MG/DL (ref 1.6–2.4)
MAGNESIUM SERPL-MCNC: 1.7 MG/DL (ref 1.6–2.4)
MAGNESIUM SERPL-MCNC: 1.8 MG/DL (ref 1.6–2.4)
MAGNESIUM SERPL-MCNC: 1.8 MG/DL (ref 1.6–2.4)
MCH RBC QN AUTO: 30.9 PG (ref 26.6–33)
MCH RBC QN AUTO: 31.1 PG (ref 26.6–33)
MCH RBC QN AUTO: 31.2 PG (ref 26.6–33)
MCH RBC QN AUTO: 31.3 PG (ref 26.6–33)
MCH RBC QN AUTO: 31.4 PG (ref 26.6–33)
MCH RBC QN AUTO: 31.5 PG (ref 26.6–33)
MCH RBC QN AUTO: 31.5 PG (ref 26.6–33)
MCH RBC QN AUTO: 31.8 PG (ref 26.6–33)
MCH RBC QN AUTO: 31.9 PG (ref 26.6–33)
MCH RBC QN AUTO: 32.2 PG (ref 26.6–33)
MCH RBC QN AUTO: 32.4 PG (ref 26.6–33)
MCH RBC QN AUTO: 32.9 PG (ref 26.6–33)
MCH RBC QN AUTO: 32.9 PG (ref 26.6–33)
MCH RBC QN AUTO: 33.3 PG (ref 26.6–33)
MCH RBC QN AUTO: 33.3 PG (ref 26.6–33)
MCH RBC QN AUTO: 33.5 PG (ref 26.6–33)
MCH RBC QN AUTO: 34 PG (ref 26.6–33)
MCH RBC QN AUTO: 34 PG (ref 26.6–33)
MCH RBC QN AUTO: 34.2 PG (ref 26.6–33)
MCH RBC QN AUTO: 34.6 PG (ref 26.6–33)
MCH RBC QN AUTO: 34.8 PG (ref 26.6–33)
MCH RBC QN AUTO: 35.1 PG (ref 26.6–33)
MCH RBC QN AUTO: 35.6 PG (ref 26.6–33)
MCH RBC QN AUTO: 35.9 PG (ref 26.6–33)
MCH RBC QN AUTO: 36 PG (ref 26.6–33)
MCH RBC QN AUTO: 36.1 PG (ref 26.6–33)
MCH RBC QN AUTO: 36.2 PG (ref 26.6–33)
MCHC RBC AUTO-ENTMCNC: 31.4 G/DL (ref 31.5–35.7)
MCHC RBC AUTO-ENTMCNC: 31.5 G/DL (ref 31.5–35.7)
MCHC RBC AUTO-ENTMCNC: 31.7 G/DL (ref 31.5–35.7)
MCHC RBC AUTO-ENTMCNC: 31.7 G/DL (ref 31.5–35.7)
MCHC RBC AUTO-ENTMCNC: 31.8 G/DL (ref 31.5–35.7)
MCHC RBC AUTO-ENTMCNC: 31.9 G/DL (ref 31.5–35.7)
MCHC RBC AUTO-ENTMCNC: 32 G/DL (ref 31.5–35.7)
MCHC RBC AUTO-ENTMCNC: 32 G/DL (ref 31.5–35.7)
MCHC RBC AUTO-ENTMCNC: 32.2 G/DL (ref 31.5–35.7)
MCHC RBC AUTO-ENTMCNC: 32.3 G/DL (ref 31.5–35.7)
MCHC RBC AUTO-ENTMCNC: 32.4 G/DL (ref 31.5–35.7)
MCHC RBC AUTO-ENTMCNC: 32.4 G/DL (ref 31.5–35.7)
MCHC RBC AUTO-ENTMCNC: 32.5 G/DL (ref 31.5–35.7)
MCHC RBC AUTO-ENTMCNC: 32.5 G/DL (ref 31.5–35.7)
MCHC RBC AUTO-ENTMCNC: 32.6 G/DL (ref 31.5–35.7)
MCHC RBC AUTO-ENTMCNC: 32.7 G/DL (ref 31.5–35.7)
MCHC RBC AUTO-ENTMCNC: 32.7 G/DL (ref 31.5–35.7)
MCHC RBC AUTO-ENTMCNC: 32.8 G/DL (ref 31.5–35.7)
MCHC RBC AUTO-ENTMCNC: 32.8 G/DL (ref 31.5–35.7)
MCHC RBC AUTO-ENTMCNC: 32.9 G/DL (ref 31.5–35.7)
MCHC RBC AUTO-ENTMCNC: 33.2 G/DL (ref 31.5–35.7)
MCHC RBC AUTO-ENTMCNC: 33.6 G/DL (ref 31.5–35.7)
MCHC RBC AUTO-ENTMCNC: 33.7 G/DL (ref 31.5–35.7)
MCV RBC AUTO: 100 FL (ref 79–97)
MCV RBC AUTO: 101.9 FL (ref 79–97)
MCV RBC AUTO: 103.4 FL (ref 79–97)
MCV RBC AUTO: 103.4 FL (ref 79–97)
MCV RBC AUTO: 104 FL (ref 79–97)
MCV RBC AUTO: 104.5 FL (ref 79–97)
MCV RBC AUTO: 104.5 FL (ref 79–97)
MCV RBC AUTO: 104.8 FL (ref 79–97)
MCV RBC AUTO: 104.9 FL (ref 79–97)
MCV RBC AUTO: 105.1 FL (ref 79–97)
MCV RBC AUTO: 105.3 FL (ref 79–97)
MCV RBC AUTO: 107.9 FL (ref 79–97)
MCV RBC AUTO: 108.4 FL (ref 79–97)
MCV RBC AUTO: 109.1 FL (ref 79–97)
MCV RBC AUTO: 109.3 FL (ref 79–97)
MCV RBC AUTO: 110.3 FL (ref 79–97)
MCV RBC AUTO: 111.1 FL (ref 79–97)
MCV RBC AUTO: 111.2 FL (ref 79–97)
MCV RBC AUTO: 114.2 FL (ref 79–97)
MCV RBC AUTO: 93 FL (ref 79–97)
MCV RBC AUTO: 95.5 FL (ref 79–97)
MCV RBC AUTO: 95.6 FL (ref 79–97)
MCV RBC AUTO: 95.7 FL (ref 79–97)
MCV RBC AUTO: 96.1 FL (ref 79–97)
MCV RBC AUTO: 97.2 FL (ref 79–97)
MCV RBC AUTO: 97.4 FL (ref 79–97)
MCV RBC AUTO: 98.2 FL (ref 79–97)
MCV RBC AUTO: 98.4 FL (ref 79–97)
MCV RBC AUTO: 99.2 FL (ref 79–97)
MCV RBC AUTO: 99.3 FL (ref 79–97)
MCV RBC AUTO: 99.5 FL (ref 79–97)
MCV RBC AUTO: 99.6 FL (ref 79–97)
MCV RBC AUTO: 99.6 FL (ref 79–97)
METAMYELOCYTES NFR BLD MANUAL: 1 % (ref 0–0)
METAMYELOCYTES NFR BLD MANUAL: 2 % (ref 0–0)
METAMYELOCYTES NFR BLD MANUAL: 6 % (ref 0–0)
MONOCYTES # BLD AUTO: 0.09 10*3/MM3 (ref 0.1–0.9)
MONOCYTES # BLD AUTO: 0.12 10*3/MM3 (ref 0.1–0.9)
MONOCYTES # BLD AUTO: 0.13 10*3/MM3 (ref 0.1–0.9)
MONOCYTES # BLD AUTO: 0.15 10*3/MM3 (ref 0.1–0.9)
MONOCYTES # BLD AUTO: 0.23 10*3/MM3 (ref 0.1–0.9)
MONOCYTES # BLD AUTO: 0.25 10*3/MM3 (ref 0.1–0.9)
MONOCYTES # BLD AUTO: 0.27 10*3/MM3 (ref 0.1–0.9)
MONOCYTES # BLD AUTO: 0.27 10*3/MM3 (ref 0.1–0.9)
MONOCYTES # BLD AUTO: 0.28 10*3/MM3 (ref 0.1–0.9)
MONOCYTES # BLD AUTO: 0.28 10*3/MM3 (ref 0.1–0.9)
MONOCYTES # BLD AUTO: 0.29 10*3/MM3 (ref 0.1–0.9)
MONOCYTES # BLD AUTO: 0.31 10*3/MM3 (ref 0.1–0.9)
MONOCYTES # BLD AUTO: 0.32 10*3/MM3 (ref 0.1–0.9)
MONOCYTES # BLD AUTO: 0.33 10*3/MM3 (ref 0.1–0.9)
MONOCYTES # BLD AUTO: 0.34 10*3/MM3 (ref 0.1–0.9)
MONOCYTES # BLD AUTO: 0.36 10*3/MM3 (ref 0.1–0.9)
MONOCYTES # BLD AUTO: 0.36 10*3/MM3 (ref 0.1–0.9)
MONOCYTES # BLD AUTO: 0.37 10*3/MM3 (ref 0.1–0.9)
MONOCYTES # BLD AUTO: 0.37 10*3/MM3 (ref 0.1–0.9)
MONOCYTES # BLD AUTO: 0.39 10*3/MM3 (ref 0.1–0.9)
MONOCYTES # BLD AUTO: 0.41 10*3/MM3 (ref 0.1–0.9)
MONOCYTES # BLD AUTO: 0.41 10*3/MM3 (ref 0.1–0.9)
MONOCYTES # BLD AUTO: 0.42 10*3/MM3 (ref 0.1–0.9)
MONOCYTES # BLD AUTO: 0.44 10*3/MM3 (ref 0.1–0.9)
MONOCYTES # BLD AUTO: 0.46 10*3/MM3 (ref 0.1–0.9)
MONOCYTES # BLD AUTO: 0.52 10*3/MM3 (ref 0.1–0.9)
MONOCYTES # BLD AUTO: 0.53 10*3/MM3 (ref 0.1–0.9)
MONOCYTES # BLD AUTO: 0.53 10*3/MM3 (ref 0.1–0.9)
MONOCYTES # BLD AUTO: 0.55 10*3/MM3 (ref 0.1–0.9)
MONOCYTES # BLD AUTO: 0.68 10*3/MM3 (ref 0.1–0.9)
MONOCYTES # BLD AUTO: 0.82 10*3/MM3 (ref 0.1–0.9)
MONOCYTES # BLD AUTO: 0.84 10*3/MM3 (ref 0.1–0.9)
MONOCYTES # BLD AUTO: 0.92 10*3/MM3 (ref 0.1–0.9)
MONOCYTES NFR BLD AUTO: 10 % (ref 5–12)
MONOCYTES NFR BLD AUTO: 10.1 % (ref 5–12)
MONOCYTES NFR BLD AUTO: 10.2 % (ref 5–12)
MONOCYTES NFR BLD AUTO: 10.9 % (ref 5–12)
MONOCYTES NFR BLD AUTO: 10.9 % (ref 5–12)
MONOCYTES NFR BLD AUTO: 11.5 % (ref 5–12)
MONOCYTES NFR BLD AUTO: 11.5 % (ref 5–12)
MONOCYTES NFR BLD AUTO: 12.2 % (ref 5–12)
MONOCYTES NFR BLD AUTO: 13.6 % (ref 5–12)
MONOCYTES NFR BLD AUTO: 15.4 % (ref 5–12)
MONOCYTES NFR BLD AUTO: 19.9 % (ref 5–12)
MONOCYTES NFR BLD AUTO: 2.6 % (ref 5–12)
MONOCYTES NFR BLD AUTO: 3.6 % (ref 5–12)
MONOCYTES NFR BLD AUTO: 4.8 % (ref 5–12)
MONOCYTES NFR BLD AUTO: 5.2 % (ref 5–12)
MONOCYTES NFR BLD AUTO: 5.3 % (ref 5–12)
MONOCYTES NFR BLD AUTO: 5.4 % (ref 5–12)
MONOCYTES NFR BLD AUTO: 5.6 % (ref 5–12)
MONOCYTES NFR BLD AUTO: 5.7 % (ref 5–12)
MONOCYTES NFR BLD AUTO: 5.8 % (ref 5–12)
MONOCYTES NFR BLD AUTO: 5.9 % (ref 5–12)
MONOCYTES NFR BLD AUTO: 6.2 % (ref 5–12)
MONOCYTES NFR BLD AUTO: 6.2 % (ref 5–12)
MONOCYTES NFR BLD AUTO: 6.3 % (ref 5–12)
MONOCYTES NFR BLD AUTO: 7.9 % (ref 5–12)
MONOCYTES NFR BLD AUTO: 8.1 % (ref 5–12)
MONOCYTES NFR BLD AUTO: 8.3 % (ref 5–12)
MONOCYTES NFR BLD AUTO: 8.6 % (ref 5–12)
MONOCYTES NFR BLD AUTO: 8.9 % (ref 5–12)
MONOCYTES NFR BLD AUTO: 9.1 % (ref 5–12)
NEUTROPHILS # BLD AUTO: 1.17 10*3/MM3 (ref 1.7–7)
NEUTROPHILS # BLD AUTO: 1.42 10*3/MM3 (ref 1.7–7)
NEUTROPHILS # BLD AUTO: 1.67 10*3/MM3 (ref 1.7–7)
NEUTROPHILS NFR BLD AUTO: 1 10*3/MM3 (ref 1.7–7)
NEUTROPHILS NFR BLD AUTO: 1.12 10*3/MM3 (ref 1.7–7)
NEUTROPHILS NFR BLD AUTO: 1.19 10*3/MM3 (ref 1.7–7)
NEUTROPHILS NFR BLD AUTO: 1.29 10*3/MM3 (ref 1.7–7)
NEUTROPHILS NFR BLD AUTO: 1.35 10*3/MM3 (ref 1.7–7)
NEUTROPHILS NFR BLD AUTO: 1.53 10*3/MM3 (ref 1.7–7)
NEUTROPHILS NFR BLD AUTO: 1.6 10*3/MM3 (ref 1.7–7)
NEUTROPHILS NFR BLD AUTO: 1.61 10*3/MM3 (ref 1.7–7)
NEUTROPHILS NFR BLD AUTO: 1.64 10*3/MM3 (ref 1.7–7)
NEUTROPHILS NFR BLD AUTO: 1.76 10*3/MM3 (ref 1.7–7)
NEUTROPHILS NFR BLD AUTO: 1.86 10*3/MM3 (ref 1.7–7)
NEUTROPHILS NFR BLD AUTO: 1.87 10*3/MM3 (ref 1.7–7)
NEUTROPHILS NFR BLD AUTO: 1.94 10*3/MM3 (ref 1.7–7)
NEUTROPHILS NFR BLD AUTO: 1.95 10*3/MM3 (ref 1.7–7)
NEUTROPHILS NFR BLD AUTO: 2.25 10*3/MM3 (ref 1.7–7)
NEUTROPHILS NFR BLD AUTO: 2.25 10*3/MM3 (ref 1.7–7)
NEUTROPHILS NFR BLD AUTO: 2.4 10*3/MM3 (ref 1.7–7)
NEUTROPHILS NFR BLD AUTO: 2.56 10*3/MM3 (ref 1.7–7)
NEUTROPHILS NFR BLD AUTO: 2.64 10*3/MM3 (ref 1.7–7)
NEUTROPHILS NFR BLD AUTO: 2.67 10*3/MM3 (ref 1.7–7)
NEUTROPHILS NFR BLD AUTO: 2.74 10*3/MM3 (ref 1.7–7)
NEUTROPHILS NFR BLD AUTO: 2.77 10*3/MM3 (ref 1.7–7)
NEUTROPHILS NFR BLD AUTO: 2.88 10*3/MM3 (ref 1.7–7)
NEUTROPHILS NFR BLD AUTO: 25.6 % (ref 42.7–76)
NEUTROPHILS NFR BLD AUTO: 27.6 % (ref 42.7–76)
NEUTROPHILS NFR BLD AUTO: 3.06 10*3/MM3 (ref 1.7–7)
NEUTROPHILS NFR BLD AUTO: 3.47 10*3/MM3 (ref 1.7–7)
NEUTROPHILS NFR BLD AUTO: 33.1 % (ref 42.7–76)
NEUTROPHILS NFR BLD AUTO: 35.5 % (ref 42.7–76)
NEUTROPHILS NFR BLD AUTO: 36.8 % (ref 42.7–76)
NEUTROPHILS NFR BLD AUTO: 37 % (ref 42.7–76)
NEUTROPHILS NFR BLD AUTO: 38 % (ref 42.7–76)
NEUTROPHILS NFR BLD AUTO: 4 10*3/MM3 (ref 1.7–7)
NEUTROPHILS NFR BLD AUTO: 4.07 10*3/MM3 (ref 1.7–7)
NEUTROPHILS NFR BLD AUTO: 4.36 10*3/MM3 (ref 1.7–7)
NEUTROPHILS NFR BLD AUTO: 4.51 10*3/MM3 (ref 1.7–7)
NEUTROPHILS NFR BLD AUTO: 40 % (ref 42.7–76)
NEUTROPHILS NFR BLD AUTO: 40.8 % (ref 42.7–76)
NEUTROPHILS NFR BLD AUTO: 43.9 % (ref 42.7–76)
NEUTROPHILS NFR BLD AUTO: 46.6 % (ref 42.7–76)
NEUTROPHILS NFR BLD AUTO: 47.2 % (ref 42.7–76)
NEUTROPHILS NFR BLD AUTO: 48.1 % (ref 42.7–76)
NEUTROPHILS NFR BLD AUTO: 48.5 % (ref 42.7–76)
NEUTROPHILS NFR BLD AUTO: 5.13 10*3/MM3 (ref 1.7–7)
NEUTROPHILS NFR BLD AUTO: 53.5 % (ref 42.7–76)
NEUTROPHILS NFR BLD AUTO: 55 % (ref 42.7–76)
NEUTROPHILS NFR BLD AUTO: 55.2 % (ref 42.7–76)
NEUTROPHILS NFR BLD AUTO: 57.5 % (ref 42.7–76)
NEUTROPHILS NFR BLD AUTO: 58.6 % (ref 42.7–76)
NEUTROPHILS NFR BLD AUTO: 59.3 % (ref 42.7–76)
NEUTROPHILS NFR BLD AUTO: 61.1 % (ref 42.7–76)
NEUTROPHILS NFR BLD AUTO: 62.2 % (ref 42.7–76)
NEUTROPHILS NFR BLD AUTO: 62.7 % (ref 42.7–76)
NEUTROPHILS NFR BLD AUTO: 62.8 % (ref 42.7–76)
NEUTROPHILS NFR BLD AUTO: 62.9 % (ref 42.7–76)
NEUTROPHILS NFR BLD AUTO: 65.1 % (ref 42.7–76)
NEUTROPHILS NFR BLD AUTO: 68.9 % (ref 42.7–76)
NEUTROPHILS NFR BLD AUTO: 71.1 % (ref 42.7–76)
NEUTROPHILS NFR BLD AUTO: 72.9 % (ref 42.7–76)
NEUTROPHILS NFR BLD AUTO: 74.5 % (ref 42.7–76)
NEUTROPHILS NFR BLD MANUAL: 30 % (ref 42.7–76)
NEUTROPHILS NFR BLD MANUAL: 35 % (ref 42.7–76)
NEUTROPHILS NFR BLD MANUAL: 35 % (ref 42.7–76)
NEUTS BAND NFR BLD MANUAL: 2 % (ref 0–5)
NEUTS BAND NFR BLD MANUAL: 7 % (ref 0–5)
NRBC BLD AUTO-RTO: 0 /100 WBC (ref 0–0.2)
NRBC BLD AUTO-RTO: 0.6 /100 WBC (ref 0–0.2)
NRBC BLD AUTO-RTO: 1.2 /100 WBC (ref 0–0.2)
NRBC SPEC MANUAL: 2 /100 WBC (ref 0–0.2)
NT-PROBNP SERPL-MCNC: 1105 PG/ML (ref 0–900)
NT-PROBNP SERPL-MCNC: 542.6 PG/ML (ref 0–900)
NT-PROBNP SERPL-MCNC: 596 PG/ML (ref 0–900)
NT-PROBNP SERPL-MCNC: 687.9 PG/ML (ref 0–900)
NT-PROBNP SERPL-MCNC: 858.7 PG/ML (ref 0–900)
NT-PROBNP SERPL-MCNC: 864.4 PG/ML (ref 0–900)
OVALOCYTES BLD QL SMEAR: NORMAL
PASSIVE ANTI-CD-38: NORMAL
PATH INTERP BLD-IMP: NORMAL
PHOSPHATE SERPL-MCNC: 2.1 MG/DL (ref 2.5–4.5)
PHOSPHATE SERPL-MCNC: 2.5 MG/DL (ref 2.5–4.5)
PHOSPHATE SERPL-MCNC: 3 MG/DL (ref 2.5–4.5)
PHOSPHATE SERPL-MCNC: 3 MG/DL (ref 2.5–4.5)
PHOSPHATE SERPL-MCNC: 3.3 MG/DL (ref 2.5–4.5)
PHOSPHATE SERPL-MCNC: 3.4 MG/DL (ref 2.5–4.5)
PHOSPHATE SERPL-MCNC: 3.5 MG/DL (ref 2.5–4.5)
PHOSPHATE SERPL-MCNC: 4 MG/DL (ref 2.5–4.5)
PHOSPHATE SERPL-MCNC: 4.2 MG/DL (ref 2.5–4.5)
PHOSPHATE SERPL-MCNC: 4.2 MG/DL (ref 2.5–4.5)
PHOSPHATE SERPL-MCNC: 7.9 MG/DL (ref 2.5–4.5)
PLAT MORPH BLD: NORMAL
PLATELET # BLD AUTO: 112 10*3/MM3 (ref 140–450)
PLATELET # BLD AUTO: 12 10*3/MM3 (ref 140–450)
PLATELET # BLD AUTO: 131 10*3/MM3 (ref 140–450)
PLATELET # BLD AUTO: 143 10*3/MM3 (ref 140–450)
PLATELET # BLD AUTO: 144 10*3/MM3 (ref 140–450)
PLATELET # BLD AUTO: 15 10*3/MM3 (ref 140–450)
PLATELET # BLD AUTO: 151 10*3/MM3 (ref 140–450)
PLATELET # BLD AUTO: 154 10*3/MM3 (ref 140–450)
PLATELET # BLD AUTO: 170 10*3/MM3 (ref 140–450)
PLATELET # BLD AUTO: 20 10*3/MM3 (ref 140–450)
PLATELET # BLD AUTO: 22 10*3/MM3 (ref 140–450)
PLATELET # BLD AUTO: 25 10*3/MM3 (ref 140–450)
PLATELET # BLD AUTO: 26 10*3/MM3 (ref 140–450)
PLATELET # BLD AUTO: 26 10*3/MM3 (ref 140–450)
PLATELET # BLD AUTO: 36 10*3/MM3 (ref 140–450)
PLATELET # BLD AUTO: 38 10*3/MM3 (ref 140–450)
PLATELET # BLD AUTO: 49 10*3/MM3 (ref 140–450)
PLATELET # BLD AUTO: 50 10*3/MM3 (ref 140–450)
PLATELET # BLD AUTO: 57 10*3/MM3 (ref 140–450)
PLATELET # BLD AUTO: 60 10*3/MM3 (ref 140–450)
PLATELET # BLD AUTO: 63 10*3/MM3 (ref 140–450)
PLATELET # BLD AUTO: 63 10*3/MM3 (ref 140–450)
PLATELET # BLD AUTO: 65 10*3/MM3 (ref 140–450)
PLATELET # BLD AUTO: 66 10*3/MM3 (ref 140–450)
PLATELET # BLD AUTO: 67 10*3/MM3 (ref 140–450)
PLATELET # BLD AUTO: 70 10*3/MM3 (ref 140–450)
PLATELET # BLD AUTO: 82 10*3/MM3 (ref 140–450)
PLATELET # BLD AUTO: 82 10*3/MM3 (ref 140–450)
PLATELET # BLD AUTO: 83 10*3/MM3 (ref 140–450)
PLATELET # BLD AUTO: 89 10*3/MM3 (ref 140–450)
PLATELET # BLD AUTO: 91 10*3/MM3 (ref 140–450)
PLATELET # BLD AUTO: 91 10*3/MM3 (ref 140–450)
PLATELET # BLD AUTO: 94 10*3/MM3 (ref 140–450)
PMV BLD AUTO: 10.1 FL (ref 6–12)
PMV BLD AUTO: 10.2 FL (ref 6–12)
PMV BLD AUTO: 10.2 FL (ref 6–12)
PMV BLD AUTO: 10.4 FL (ref 6–12)
PMV BLD AUTO: 10.4 FL (ref 6–12)
PMV BLD AUTO: 10.5 FL (ref 6–12)
PMV BLD AUTO: 10.7 FL (ref 6–12)
PMV BLD AUTO: 10.7 FL (ref 6–12)
PMV BLD AUTO: 10.8 FL (ref 6–12)
PMV BLD AUTO: 11 FL (ref 6–12)
PMV BLD AUTO: 11 FL (ref 6–12)
PMV BLD AUTO: 11.2 FL (ref 6–12)
PMV BLD AUTO: 11.3 FL (ref 6–12)
PMV BLD AUTO: 11.5 FL (ref 6–12)
PMV BLD AUTO: 11.6 FL (ref 6–12)
PMV BLD AUTO: 11.7 FL (ref 6–12)
PMV BLD AUTO: 11.8 FL (ref 6–12)
PMV BLD AUTO: 11.8 FL (ref 6–12)
PMV BLD AUTO: 12 FL (ref 6–12)
PMV BLD AUTO: 12.1 FL (ref 6–12)
PMV BLD AUTO: 12.2 FL (ref 6–12)
PMV BLD AUTO: 12.3 FL (ref 6–12)
PMV BLD AUTO: 12.9 FL (ref 6–12)
PMV BLD AUTO: 13.1 FL (ref 6–12)
PMV BLD AUTO: 13.2 FL (ref 6–12)
PMV BLD AUTO: ABNORMAL FL
POIKILOCYTOSIS BLD QL SMEAR: ABNORMAL
POIKILOCYTOSIS BLD QL SMEAR: ABNORMAL
POIKILOCYTOSIS BLD QL SMEAR: NORMAL
POLYCHROMASIA BLD QL SMEAR: NORMAL
POTASSIUM SERPL-SCNC: 3.7 MMOL/L (ref 3.5–5.2)
POTASSIUM SERPL-SCNC: 4.4 MMOL/L (ref 3.5–5.2)
POTASSIUM SERPL-SCNC: 4.6 MMOL/L (ref 3.5–5.2)
POTASSIUM SERPL-SCNC: 4.6 MMOL/L (ref 3.5–5.2)
POTASSIUM SERPL-SCNC: 4.7 MMOL/L (ref 3.5–5.2)
POTASSIUM SERPL-SCNC: 4.8 MMOL/L (ref 3.5–5.2)
POTASSIUM SERPL-SCNC: 4.9 MMOL/L (ref 3.5–5.2)
POTASSIUM SERPL-SCNC: 5 MMOL/L (ref 3.5–5.2)
POTASSIUM SERPL-SCNC: 5.1 MMOL/L (ref 3.5–5.2)
POTASSIUM SERPL-SCNC: 5.2 MMOL/L (ref 3.5–5.2)
POTASSIUM SERPL-SCNC: 5.3 MMOL/L (ref 3.5–5.2)
POTASSIUM SERPL-SCNC: 5.4 MMOL/L (ref 3.5–5.2)
PROT PATTERN SERPL ELPH-IMP: ABNORMAL
PROT SERPL-MCNC: 6 G/DL (ref 6–8.5)
PROT SERPL-MCNC: 6.3 G/DL (ref 6–8.5)
PROT SERPL-MCNC: 6.4 G/DL (ref 6–8.5)
PROT SERPL-MCNC: 6.5 G/DL (ref 6–8.5)
PROT SERPL-MCNC: 6.6 G/DL (ref 6–8.5)
PROT SERPL-MCNC: 6.7 G/DL (ref 6–8.5)
PROT SERPL-MCNC: 6.9 G/DL (ref 6–8.5)
PROT SERPL-MCNC: 7 G/DL (ref 6–8.5)
PROT SERPL-MCNC: 7.1 G/DL (ref 6–8.5)
PROT SERPL-MCNC: 7.2 G/DL (ref 6–8.5)
PROT SERPL-MCNC: 7.3 G/DL (ref 6–8.5)
PROT SERPL-MCNC: 7.4 G/DL (ref 6–8.5)
PROT SERPL-MCNC: 7.6 G/DL (ref 6–8.5)
PROT SERPL-MCNC: 7.8 G/DL (ref 6–8.5)
PROT SERPL-MCNC: 7.8 G/DL (ref 6–8.5)
PROT SERPL-MCNC: 7.9 G/DL (ref 6–8.5)
PROT SERPL-MCNC: 7.9 G/DL (ref 6–8.5)
PROT SERPL-MCNC: 8.1 G/DL (ref 6–8.5)
PROT SERPL-MCNC: 8.2 G/DL (ref 6–8.5)
PROT SERPL-MCNC: 8.3 G/DL (ref 6–8.5)
RBC # BLD AUTO: 1.85 10*6/MM3 (ref 4.14–5.8)
RBC # BLD AUTO: 1.89 10*6/MM3 (ref 4.14–5.8)
RBC # BLD AUTO: 2.1 10*6/MM3 (ref 4.14–5.8)
RBC # BLD AUTO: 2.13 10*6/MM3 (ref 4.14–5.8)
RBC # BLD AUTO: 2.15 10*6/MM3 (ref 4.14–5.8)
RBC # BLD AUTO: 2.18 10*6/MM3 (ref 4.14–5.8)
RBC # BLD AUTO: 2.23 10*6/MM3 (ref 4.14–5.8)
RBC # BLD AUTO: 2.31 10*6/MM3 (ref 4.14–5.8)
RBC # BLD AUTO: 2.35 10*6/MM3 (ref 4.14–5.8)
RBC # BLD AUTO: 2.37 10*6/MM3 (ref 4.14–5.8)
RBC # BLD AUTO: 2.39 10*6/MM3 (ref 4.14–5.8)
RBC # BLD AUTO: 2.42 10*6/MM3 (ref 4.14–5.8)
RBC # BLD AUTO: 2.45 10*6/MM3 (ref 4.14–5.8)
RBC # BLD AUTO: 2.46 10*6/MM3 (ref 4.14–5.8)
RBC # BLD AUTO: 2.48 10*6/MM3 (ref 4.14–5.8)
RBC # BLD AUTO: 2.51 10*6/MM3 (ref 4.14–5.8)
RBC # BLD AUTO: 2.52 10*6/MM3 (ref 4.14–5.8)
RBC # BLD AUTO: 2.58 10*6/MM3 (ref 4.14–5.8)
RBC # BLD AUTO: 2.64 10*6/MM3 (ref 4.14–5.8)
RBC # BLD AUTO: 2.66 10*6/MM3 (ref 4.14–5.8)
RBC # BLD AUTO: 2.67 10*6/MM3 (ref 4.14–5.8)
RBC # BLD AUTO: 2.7 10*6/MM3 (ref 4.14–5.8)
RBC # BLD AUTO: 2.77 10*6/MM3 (ref 4.14–5.8)
RBC # BLD AUTO: 2.82 10*6/MM3 (ref 4.14–5.8)
RBC # BLD AUTO: 2.85 10*6/MM3 (ref 4.14–5.8)
RBC # BLD AUTO: 2.88 10*6/MM3 (ref 4.14–5.8)
RBC # BLD AUTO: 2.9 10*6/MM3 (ref 4.14–5.8)
RBC # BLD AUTO: 2.94 10*6/MM3 (ref 4.14–5.8)
RBC # BLD AUTO: 2.98 10*6/MM3 (ref 4.14–5.8)
RBC # BLD AUTO: 3.27 10*6/MM3 (ref 4.14–5.8)
RBC # BLD AUTO: 3.34 10*6/MM3 (ref 4.14–5.8)
RBC MORPH BLD: NORMAL
RH BLD: POSITIVE
SCAN SLIDE: NORMAL
SMALL PLATELETS BLD QL SMEAR: ABNORMAL
SMALL PLATELETS BLD QL SMEAR: ADEQUATE
SMALL PLATELETS BLD QL SMEAR: ADEQUATE
SMALL PLATELETS BLD QL SMEAR: NORMAL
SODIUM SERPL-SCNC: 131 MMOL/L (ref 136–145)
SODIUM SERPL-SCNC: 132 MMOL/L (ref 136–145)
SODIUM SERPL-SCNC: 133 MMOL/L (ref 136–145)
SODIUM SERPL-SCNC: 134 MMOL/L (ref 136–145)
SODIUM SERPL-SCNC: 135 MMOL/L (ref 136–145)
SODIUM SERPL-SCNC: 136 MMOL/L (ref 136–145)
SODIUM SERPL-SCNC: 138 MMOL/L (ref 136–145)
SODIUM SERPL-SCNC: 139 MMOL/L (ref 136–145)
SODIUM SERPL-SCNC: 142 MMOL/L (ref 136–145)
T&S EXPIRATION DATE: NORMAL
UNIT  ABO: NORMAL
UNIT  RH: NORMAL
VARIANT LYMPHS NFR BLD MANUAL: 1 % (ref 0–5)
VARIANT LYMPHS NFR BLD MANUAL: 2 % (ref 0–5)
WBC # BLD AUTO: 2.5 10*3/MM3 (ref 3.4–10.8)
WBC # BLD AUTO: 2.92 10*3/MM3 (ref 3.4–10.8)
WBC # BLD AUTO: 3.11 10*3/MM3 (ref 3.4–10.8)
WBC # BLD AUTO: 3.15 10*3/MM3 (ref 3.4–10.8)
WBC # BLD AUTO: 3.38 10*3/MM3 (ref 3.4–10.8)
WBC # BLD AUTO: 3.49 10*3/MM3 (ref 3.4–10.8)
WBC # BLD AUTO: 3.56 10*3/MM3 (ref 3.4–10.8)
WBC # BLD AUTO: 3.74 10*3/MM3 (ref 3.4–10.8)
WBC # BLD AUTO: 3.86 10*3/MM3 (ref 3.4–10.8)
WBC # BLD AUTO: 3.9 10*3/MM3 (ref 3.4–10.8)
WBC # BLD AUTO: 4.05 10*3/MM3 (ref 3.4–10.8)
WBC # BLD AUTO: 4.05 10*3/MM3 (ref 3.4–10.8)
WBC # BLD AUTO: 4.18 10*3/MM3 (ref 3.4–10.8)
WBC # BLD AUTO: 4.31 10*3/MM3 (ref 3.4–10.8)
WBC # BLD AUTO: 4.33 10*3/MM3 (ref 3.4–10.8)
WBC # BLD AUTO: 4.52 10*3/MM3 (ref 3.4–10.8)
WBC # BLD AUTO: 4.54 10*3/MM3 (ref 3.4–10.8)
WBC # BLD AUTO: 4.56 10*3/MM3 (ref 3.4–10.8)
WBC # BLD AUTO: 4.6 10*3/MM3 (ref 3.4–10.8)
WBC # BLD AUTO: 4.63 10*3/MM3 (ref 3.4–10.8)
WBC # BLD AUTO: 4.7 10*3/MM3 (ref 3.4–10.8)
WBC # BLD AUTO: 4.77 10*3/MM3 (ref 3.4–10.8)
WBC # BLD AUTO: 4.79 10*3/MM3 (ref 3.4–10.8)
WBC # BLD AUTO: 4.8 10*3/MM3 (ref 3.4–10.8)
WBC # BLD AUTO: 5.12 10*3/MM3 (ref 3.4–10.8)
WBC # BLD AUTO: 5.45 10*3/MM3 (ref 3.4–10.8)
WBC # BLD AUTO: 5.57 10*3/MM3 (ref 3.4–10.8)
WBC # BLD AUTO: 5.59 10*3/MM3 (ref 3.4–10.8)
WBC # BLD AUTO: 5.81 10*3/MM3 (ref 3.4–10.8)
WBC # BLD AUTO: 5.85 10*3/MM3 (ref 3.4–10.8)
WBC # BLD AUTO: 5.89 10*3/MM3 (ref 3.4–10.8)
WBC # BLD AUTO: 6.34 10*3/MM3 (ref 3.4–10.8)
WBC # BLD AUTO: 8.16 10*3/MM3 (ref 3.4–10.8)
WBC MORPH BLD: NORMAL

## 2021-01-01 PROCEDURE — 25010000002 METHYLPREDNISOLONE PER 125 MG: Performed by: NURSE PRACTITIONER

## 2021-01-01 PROCEDURE — 83883 ASSAY NEPHELOMETRY NOT SPEC: CPT

## 2021-01-01 PROCEDURE — 96401 CHEMO ANTI-NEOPL SQ/IM: CPT

## 2021-01-01 PROCEDURE — 25010000002 DIPHENHYDRAMINE PER 50 MG: Performed by: INTERNAL MEDICINE

## 2021-01-01 PROCEDURE — 36415 COLL VENOUS BLD VENIPUNCTURE: CPT

## 2021-01-01 PROCEDURE — 85007 BL SMEAR W/DIFF WBC COUNT: CPT

## 2021-01-01 PROCEDURE — 86901 BLOOD TYPING SEROLOGIC RH(D): CPT | Performed by: INTERNAL MEDICINE

## 2021-01-01 PROCEDURE — 96360 HYDRATION IV INFUSION INIT: CPT

## 2021-01-01 PROCEDURE — 80053 COMPREHEN METABOLIC PANEL: CPT

## 2021-01-01 PROCEDURE — 85025 COMPLETE CBC W/AUTO DIFF WBC: CPT

## 2021-01-01 PROCEDURE — P9016 RBC LEUKOCYTES REDUCED: HCPCS

## 2021-01-01 PROCEDURE — 84100 ASSAY OF PHOSPHORUS: CPT

## 2021-01-01 PROCEDURE — 84165 PROTEIN E-PHORESIS SERUM: CPT

## 2021-01-01 PROCEDURE — 25010000002 DARATUMUMAB-HYALURONIDASE-FIHJ 1800-30000 MG-UT/15ML SOLUTION: Performed by: INTERNAL MEDICINE

## 2021-01-01 PROCEDURE — 96375 TX/PRO/DX INJ NEW DRUG ADDON: CPT

## 2021-01-01 PROCEDURE — 86870 RBC ANTIBODY IDENTIFICATION: CPT

## 2021-01-01 PROCEDURE — 25010000002 CARFILZOMIB 30 MG RECONSTITUTED SOLUTION 1 EACH VIAL: Performed by: INTERNAL MEDICINE

## 2021-01-01 PROCEDURE — 25010000002 CARFILZOMIB 30 MG RECONSTITUTED SOLUTION 1 EACH VIAL: Performed by: NURSE PRACTITIONER

## 2021-01-01 PROCEDURE — 96372 THER/PROPH/DIAG INJ SC/IM: CPT

## 2021-01-01 PROCEDURE — 96413 CHEMO IV INFUSION 1 HR: CPT

## 2021-01-01 PROCEDURE — 25010000002 METHYLPREDNISOLONE PER 125 MG: Performed by: INTERNAL MEDICINE

## 2021-01-01 PROCEDURE — 96361 HYDRATE IV INFUSION ADD-ON: CPT

## 2021-01-01 PROCEDURE — 96366 THER/PROPH/DIAG IV INF ADDON: CPT

## 2021-01-01 PROCEDURE — 25010000002 DARATUMUMAB-HYALURONIDASE-FIHJ 1800-30000 MG-UT/15ML SOLUTION: Performed by: NURSE PRACTITIONER

## 2021-01-01 PROCEDURE — 99215 OFFICE O/P EST HI 40 MIN: CPT | Performed by: NURSE PRACTITIONER

## 2021-01-01 PROCEDURE — 86870 RBC ANTIBODY IDENTIFICATION: CPT | Performed by: INTERNAL MEDICINE

## 2021-01-01 PROCEDURE — 84155 ASSAY OF PROTEIN SERUM: CPT

## 2021-01-01 PROCEDURE — 86850 RBC ANTIBODY SCREEN: CPT

## 2021-01-01 PROCEDURE — 25010000002 DENOSUMAB 120 MG/1.7ML SOLUTION: Performed by: INTERNAL MEDICINE

## 2021-01-01 PROCEDURE — 96367 TX/PROPH/DG ADDL SEQ IV INF: CPT

## 2021-01-01 PROCEDURE — 86900 BLOOD TYPING SEROLOGIC ABO: CPT

## 2021-01-01 PROCEDURE — 83735 ASSAY OF MAGNESIUM: CPT

## 2021-01-01 PROCEDURE — 86922 COMPATIBILITY TEST ANTIGLOB: CPT

## 2021-01-01 PROCEDURE — 25010000002 CARFILZOMIB 60 MG RECONSTITUTED SOLUTION 60 MG VIAL: Performed by: NURSE PRACTITIONER

## 2021-01-01 PROCEDURE — 86900 BLOOD TYPING SEROLOGIC ABO: CPT | Performed by: INTERNAL MEDICINE

## 2021-01-01 PROCEDURE — 88305 TISSUE EXAM BY PATHOLOGIST: CPT | Performed by: INTERNAL MEDICINE

## 2021-01-01 PROCEDURE — 25010000002 DIPHENHYDRAMINE PER 50 MG: Performed by: NURSE PRACTITIONER

## 2021-01-01 PROCEDURE — 63710000001 DIPHENHYDRAMINE PER 50 MG: Performed by: NURSE PRACTITIONER

## 2021-01-01 PROCEDURE — 86334 IMMUNOFIX E-PHORESIS SERUM: CPT

## 2021-01-01 PROCEDURE — P9035 PLATELET PHERES LEUKOREDUCED: HCPCS

## 2021-01-01 PROCEDURE — 83880 ASSAY OF NATRIURETIC PEPTIDE: CPT

## 2021-01-01 PROCEDURE — 82784 ASSAY IGA/IGD/IGG/IGM EACH: CPT

## 2021-01-01 PROCEDURE — 36430 TRANSFUSION BLD/BLD COMPNT: CPT

## 2021-01-01 PROCEDURE — 80053 COMPREHEN METABOLIC PANEL: CPT | Performed by: INTERNAL MEDICINE

## 2021-01-01 PROCEDURE — 86920 COMPATIBILITY TEST SPIN: CPT

## 2021-01-01 PROCEDURE — 96376 TX/PRO/DX INJ SAME DRUG ADON: CPT

## 2021-01-01 PROCEDURE — 88342 IMHCHEM/IMCYTCHM 1ST ANTB: CPT | Performed by: INTERNAL MEDICINE

## 2021-01-01 PROCEDURE — 85007 BL SMEAR W/DIFF WBC COUNT: CPT | Performed by: INTERNAL MEDICINE

## 2021-01-01 PROCEDURE — 86901 BLOOD TYPING SEROLOGIC RH(D): CPT

## 2021-01-01 PROCEDURE — 25010000002 FUROSEMIDE PER 20 MG: Performed by: INTERNAL MEDICINE

## 2021-01-01 PROCEDURE — 96374 THER/PROPH/DIAG INJ IV PUSH: CPT

## 2021-01-01 PROCEDURE — 25010000002 DENOSUMAB 120 MG/1.7ML SOLUTION: Performed by: NURSE PRACTITIONER

## 2021-01-01 PROCEDURE — 85007 BL SMEAR W/DIFF WBC COUNT: CPT | Performed by: NURSE PRACTITIONER

## 2021-01-01 PROCEDURE — 99214 OFFICE O/P EST MOD 30 MIN: CPT | Performed by: NURSE PRACTITIONER

## 2021-01-01 PROCEDURE — 36591 DRAW BLOOD OFF VENOUS DEVICE: CPT

## 2021-01-01 PROCEDURE — 25010000003 MEPERIDINE PER 100 MG: Performed by: INTERNAL MEDICINE

## 2021-01-01 PROCEDURE — 86850 RBC ANTIBODY SCREEN: CPT | Performed by: INTERNAL MEDICINE

## 2021-01-01 PROCEDURE — G0463 HOSPITAL OUTPT CLINIC VISIT: HCPCS

## 2021-01-01 PROCEDURE — 96372 THER/PROPH/DIAG INJ SC/IM: CPT | Performed by: INTERNAL MEDICINE

## 2021-01-01 PROCEDURE — 99213 OFFICE O/P EST LOW 20 MIN: CPT | Performed by: FAMILY MEDICINE

## 2021-01-01 PROCEDURE — 85025 COMPLETE CBC W/AUTO DIFF WBC: CPT | Performed by: INTERNAL MEDICINE

## 2021-01-01 PROCEDURE — 86970 RBC PRETX INCUBATJ W/CHEMICL: CPT

## 2021-01-01 PROCEDURE — 86902 BLOOD TYPE ANTIGEN DONOR EA: CPT

## 2021-01-01 PROCEDURE — 80053 COMPREHEN METABOLIC PANEL: CPT | Performed by: NURSE PRACTITIONER

## 2021-01-01 PROCEDURE — 25010000002 CARFILZOMIB 60 MG RECONSTITUTED SOLUTION 60 MG VIAL: Performed by: INTERNAL MEDICINE

## 2021-01-01 PROCEDURE — 96365 THER/PROPH/DIAG IV INF INIT: CPT

## 2021-01-01 PROCEDURE — 99215 OFFICE O/P EST HI 40 MIN: CPT | Performed by: INTERNAL MEDICINE

## 2021-01-01 PROCEDURE — 93000 ELECTROCARDIOGRAM COMPLETE: CPT | Performed by: INTERNAL MEDICINE

## 2021-01-01 PROCEDURE — 85025 COMPLETE CBC W/AUTO DIFF WBC: CPT | Performed by: NURSE PRACTITIONER

## 2021-01-01 PROCEDURE — 85097 BONE MARROW INTERPRETATION: CPT | Performed by: INTERNAL MEDICINE

## 2021-01-01 PROCEDURE — 88365 INSITU HYBRIDIZATION (FISH): CPT | Performed by: INTERNAL MEDICINE

## 2021-01-01 PROCEDURE — 85060 BLOOD SMEAR INTERPRETATION: CPT | Performed by: INTERNAL MEDICINE

## 2021-01-01 PROCEDURE — 83036 HEMOGLOBIN GLYCOSYLATED A1C: CPT | Performed by: FAMILY MEDICINE

## 2021-01-01 PROCEDURE — 99214 OFFICE O/P EST MOD 30 MIN: CPT | Performed by: FAMILY MEDICINE

## 2021-01-01 PROCEDURE — 38222 DX BONE MARROW BX & ASPIR: CPT | Performed by: INTERNAL MEDICINE

## 2021-01-01 PROCEDURE — 25010000002 FUROSEMIDE PER 20 MG: Performed by: NURSE PRACTITIONER

## 2021-01-01 PROCEDURE — 38221 DX BONE MARROW BIOPSIES: CPT

## 2021-01-01 PROCEDURE — 88313 SPECIAL STAINS GROUP 2: CPT | Performed by: INTERNAL MEDICINE

## 2021-01-01 PROCEDURE — 36415 COLL VENOUS BLD VENIPUNCTURE: CPT | Performed by: NURSE PRACTITIONER

## 2021-01-01 PROCEDURE — 99203 OFFICE O/P NEW LOW 30 MIN: CPT | Performed by: INTERNAL MEDICINE

## 2021-01-01 PROCEDURE — 96402 CHEMO HORMON ANTINEOPL SQ/IM: CPT

## 2021-01-01 PROCEDURE — 86880 COOMBS TEST DIRECT: CPT

## 2021-01-01 PROCEDURE — 88311 DECALCIFY TISSUE: CPT | Performed by: INTERNAL MEDICINE

## 2021-01-01 PROCEDURE — 88341 IMHCHEM/IMCYTCHM EA ADD ANTB: CPT | Performed by: INTERNAL MEDICINE

## 2021-01-01 PROCEDURE — 88364 INSITU HYBRIDIZATION (FISH): CPT | Performed by: INTERNAL MEDICINE

## 2021-01-01 RX ORDER — LISINOPRIL 10 MG/1
10 TABLET ORAL DAILY
Qty: 90 TABLET | Refills: 3 | Status: SHIPPED | OUTPATIENT
Start: 2021-01-01

## 2021-01-01 RX ORDER — SODIUM CHLORIDE 9 MG/ML
250 INJECTION, SOLUTION INTRAVENOUS ONCE
Status: DISCONTINUED | OUTPATIENT
Start: 2021-01-01 | End: 2021-01-01 | Stop reason: HOSPADM

## 2021-01-01 RX ORDER — DIPHENHYDRAMINE HYDROCHLORIDE 50 MG/ML
25 INJECTION INTRAMUSCULAR; INTRAVENOUS EVERY 4 HOURS PRN
Status: DISCONTINUED | OUTPATIENT
Start: 2021-01-01 | End: 2021-01-01 | Stop reason: HOSPADM

## 2021-01-01 RX ORDER — ACETAMINOPHEN 500 MG
1000 TABLET ORAL ONCE
Status: COMPLETED | OUTPATIENT
Start: 2021-01-01 | End: 2021-01-01

## 2021-01-01 RX ORDER — SODIUM CHLORIDE 9 MG/ML
250 INJECTION, SOLUTION INTRAVENOUS ONCE
Status: CANCELLED | OUTPATIENT
Start: 2021-01-01

## 2021-01-01 RX ORDER — SODIUM CHLORIDE 9 MG/ML
500 INJECTION, SOLUTION INTRAVENOUS ONCE
Status: COMPLETED | OUTPATIENT
Start: 2021-01-01 | End: 2021-01-01

## 2021-01-01 RX ORDER — DIPHENHYDRAMINE HYDROCHLORIDE 50 MG/ML
50 INJECTION INTRAMUSCULAR; INTRAVENOUS AS NEEDED
Status: CANCELLED | OUTPATIENT
Start: 2021-01-01

## 2021-01-01 RX ORDER — DEXTROSE MONOHYDRATE 50 MG/ML
250 INJECTION, SOLUTION INTRAVENOUS ONCE
Status: CANCELLED | OUTPATIENT
Start: 2021-01-01

## 2021-01-01 RX ORDER — FAMOTIDINE 10 MG/ML
20 INJECTION, SOLUTION INTRAVENOUS AS NEEDED
Status: CANCELLED | OUTPATIENT
Start: 2021-01-01

## 2021-01-01 RX ORDER — MEPERIDINE HYDROCHLORIDE 25 MG/ML
25 INJECTION INTRAMUSCULAR; INTRAVENOUS; SUBCUTANEOUS
Status: DISCONTINUED | OUTPATIENT
Start: 2021-01-01 | End: 2021-01-01 | Stop reason: HOSPADM

## 2021-01-01 RX ORDER — WARFARIN SODIUM 4 MG/1
4 TABLET ORAL
Qty: 120 TABLET | Refills: 5 | Status: SHIPPED | OUTPATIENT
Start: 2021-01-01 | End: 2021-01-01 | Stop reason: DRUGHIGH

## 2021-01-01 RX ORDER — METHYLPREDNISOLONE SODIUM SUCCINATE 125 MG/2ML
60 INJECTION, POWDER, LYOPHILIZED, FOR SOLUTION INTRAMUSCULAR; INTRAVENOUS ONCE
Status: COMPLETED | OUTPATIENT
Start: 2021-01-01 | End: 2021-01-01

## 2021-01-01 RX ORDER — LENALIDOMIDE 25 MG/1
25 CAPSULE ORAL DAILY
Qty: 21 CAPSULE | Refills: 0 | Status: SHIPPED | OUTPATIENT
Start: 2021-01-01 | End: 2021-01-01

## 2021-01-01 RX ORDER — METHYLPREDNISOLONE SODIUM SUCCINATE 125 MG/2ML
60 INJECTION, POWDER, LYOPHILIZED, FOR SOLUTION INTRAMUSCULAR; INTRAVENOUS ONCE
Status: CANCELLED | OUTPATIENT
Start: 2021-01-01

## 2021-01-01 RX ORDER — ACETAMINOPHEN 500 MG
1000 TABLET ORAL ONCE
Status: CANCELLED | OUTPATIENT
Start: 2021-01-01

## 2021-01-01 RX ORDER — DIPHENHYDRAMINE HCL 25 MG
25 CAPSULE ORAL ONCE
Status: COMPLETED | OUTPATIENT
Start: 2021-01-01 | End: 2021-01-01

## 2021-01-01 RX ORDER — MEPERIDINE HYDROCHLORIDE 25 MG/ML
25 INJECTION INTRAMUSCULAR; INTRAVENOUS; SUBCUTANEOUS
Status: CANCELLED | OUTPATIENT
Start: 2021-01-01

## 2021-01-01 RX ORDER — DIPHENHYDRAMINE HYDROCHLORIDE 50 MG/ML
25 INJECTION INTRAMUSCULAR; INTRAVENOUS EVERY 4 HOURS PRN
Status: CANCELLED | OUTPATIENT
Start: 2021-01-01

## 2021-01-01 RX ORDER — FAMOTIDINE 10 MG/ML
20 INJECTION, SOLUTION INTRAVENOUS AS NEEDED
Status: DISCONTINUED | OUTPATIENT
Start: 2021-01-01 | End: 2021-01-01 | Stop reason: HOSPADM

## 2021-01-01 RX ORDER — SODIUM CHLORIDE 9 MG/ML
1000 INJECTION, SOLUTION INTRAVENOUS ONCE
Status: COMPLETED | OUTPATIENT
Start: 2021-01-01 | End: 2021-01-01

## 2021-01-01 RX ORDER — SODIUM CHLORIDE 9 MG/ML
250 INJECTION, SOLUTION INTRAVENOUS AS NEEDED
Status: CANCELLED | OUTPATIENT
Start: 2021-01-01

## 2021-01-01 RX ORDER — ONDANSETRON HYDROCHLORIDE 8 MG/1
TABLET, FILM COATED ORAL
Qty: 90 TABLET | Refills: 1 | Status: SHIPPED | OUTPATIENT
Start: 2021-01-01

## 2021-01-01 RX ORDER — DEXTROSE MONOHYDRATE 50 MG/ML
250 INJECTION, SOLUTION INTRAVENOUS ONCE
Status: DISCONTINUED | OUTPATIENT
Start: 2021-01-01 | End: 2021-01-01 | Stop reason: HOSPADM

## 2021-01-01 RX ORDER — GLIMEPIRIDE 2 MG/1
2 TABLET ORAL DAILY
Qty: 90 TABLET | Refills: 3 | Status: SHIPPED | OUTPATIENT
Start: 2021-01-01 | End: 2021-01-01 | Stop reason: SDUPTHER

## 2021-01-01 RX ORDER — SODIUM CHLORIDE 9 MG/ML
1000 INJECTION, SOLUTION INTRAVENOUS CONTINUOUS
Status: DISCONTINUED | OUTPATIENT
Start: 2021-01-01 | End: 2021-01-01 | Stop reason: HOSPADM

## 2021-01-01 RX ORDER — ACETAMINOPHEN 325 MG/1
650 TABLET ORAL ONCE
Status: COMPLETED | OUTPATIENT
Start: 2021-01-01 | End: 2021-01-01

## 2021-01-01 RX ORDER — ATORVASTATIN CALCIUM 80 MG/1
TABLET, FILM COATED ORAL
Qty: 90 TABLET | Refills: 3 | Status: SHIPPED | OUTPATIENT
Start: 2021-01-01

## 2021-01-01 RX ORDER — GLIMEPIRIDE 2 MG/1
2 TABLET ORAL DAILY
Qty: 90 TABLET | Refills: 3 | Status: SHIPPED | OUTPATIENT
Start: 2021-01-01

## 2021-01-01 RX ORDER — SODIUM CHLORIDE 9 MG/ML
250 INJECTION, SOLUTION INTRAVENOUS AS NEEDED
Status: DISCONTINUED | OUTPATIENT
Start: 2021-01-01 | End: 2021-01-01 | Stop reason: HOSPADM

## 2021-01-01 RX ORDER — SODIUM CHLORIDE 9 MG/ML
250 INJECTION, SOLUTION INTRAVENOUS ONCE
Status: COMPLETED | OUTPATIENT
Start: 2021-01-01 | End: 2021-01-01

## 2021-01-01 RX ORDER — SODIUM CHLORIDE 9 MG/ML
1000 INJECTION, SOLUTION INTRAVENOUS CONTINUOUS
Status: CANCELLED
Start: 2021-01-01

## 2021-01-01 RX ORDER — LIDOCAINE HYDROCHLORIDE 20 MG/ML
5 INJECTION, SOLUTION INFILTRATION; PERINEURAL ONCE
Status: CANCELLED | OUTPATIENT
Start: 2021-01-01 | End: 2021-01-01

## 2021-01-01 RX ORDER — LORAZEPAM 2 MG/ML
0.25 CONCENTRATE ORAL EVERY 6 HOURS PRN
Qty: 15 ML | Refills: 0 | Status: SHIPPED | OUTPATIENT
Start: 2021-01-01

## 2021-01-01 RX ORDER — DIPHENHYDRAMINE HYDROCHLORIDE 50 MG/ML
50 INJECTION INTRAMUSCULAR; INTRAVENOUS AS NEEDED
Status: DISCONTINUED | OUTPATIENT
Start: 2021-01-01 | End: 2021-01-01 | Stop reason: HOSPADM

## 2021-01-01 RX ORDER — METHYLPREDNISOLONE SODIUM SUCCINATE 125 MG/2ML
100 INJECTION, POWDER, LYOPHILIZED, FOR SOLUTION INTRAMUSCULAR; INTRAVENOUS ONCE
Status: COMPLETED | OUTPATIENT
Start: 2021-01-01 | End: 2021-01-01

## 2021-01-01 RX ORDER — DIPHENHYDRAMINE HCL 25 MG
25 TABLET ORAL ONCE
Status: CANCELLED | OUTPATIENT
Start: 2021-01-01 | End: 2021-01-01

## 2021-01-01 RX ORDER — LIDOCAINE HYDROCHLORIDE 20 MG/ML
5 INJECTION, SOLUTION INFILTRATION; PERINEURAL ONCE
Status: DISCONTINUED | OUTPATIENT
Start: 2021-01-01 | End: 2021-01-01 | Stop reason: HOSPADM

## 2021-01-01 RX ORDER — FUROSEMIDE 10 MG/ML
20 INJECTION INTRAMUSCULAR; INTRAVENOUS ONCE
Status: COMPLETED | OUTPATIENT
Start: 2021-01-01 | End: 2021-01-01

## 2021-01-01 RX ORDER — DIPHENHYDRAMINE HCL 25 MG
25 CAPSULE ORAL ONCE
Status: DISCONTINUED | OUTPATIENT
Start: 2021-01-01 | End: 2021-01-01 | Stop reason: HOSPADM

## 2021-01-01 RX ORDER — FUROSEMIDE 20 MG/1
TABLET ORAL
Qty: 30 TABLET | Refills: 2 | Status: SHIPPED | OUTPATIENT
Start: 2021-01-01 | End: 2021-01-01

## 2021-01-01 RX ORDER — MEPERIDINE HYDROCHLORIDE 25 MG/ML
25 INJECTION INTRAMUSCULAR; INTRAVENOUS; SUBCUTANEOUS ONCE
Status: COMPLETED | OUTPATIENT
Start: 2021-01-01 | End: 2021-01-01

## 2021-01-01 RX ORDER — ACETAMINOPHEN 325 MG/1
650 TABLET ORAL ONCE
Status: DISCONTINUED | OUTPATIENT
Start: 2021-01-01 | End: 2021-01-01 | Stop reason: HOSPADM

## 2021-01-01 RX ORDER — MONTELUKAST SODIUM 10 MG/1
10 TABLET ORAL ONCE
Status: CANCELLED | OUTPATIENT
Start: 2021-01-01

## 2021-01-01 RX ORDER — METHYLPREDNISOLONE SODIUM SUCCINATE 125 MG/2ML
100 INJECTION, POWDER, LYOPHILIZED, FOR SOLUTION INTRAMUSCULAR; INTRAVENOUS ONCE
Status: CANCELLED | OUTPATIENT
Start: 2021-01-01

## 2021-01-01 RX ORDER — ACETAMINOPHEN 325 MG/1
650 TABLET ORAL ONCE
Status: CANCELLED | OUTPATIENT
Start: 2021-01-01 | End: 2021-01-01

## 2021-01-01 RX ORDER — MONTELUKAST SODIUM 10 MG/1
10 TABLET ORAL ONCE
Status: COMPLETED | OUTPATIENT
Start: 2021-01-01 | End: 2021-01-01

## 2021-01-01 RX ORDER — FUROSEMIDE 20 MG/1
TABLET ORAL
Qty: 30 TABLET | Refills: 2 | Status: SHIPPED | OUTPATIENT
Start: 2021-01-01

## 2021-01-01 RX ORDER — FUROSEMIDE 10 MG/ML
40 INJECTION INTRAMUSCULAR; INTRAVENOUS ONCE
Status: COMPLETED | OUTPATIENT
Start: 2021-01-01 | End: 2021-01-01

## 2021-01-01 RX ORDER — WARFARIN SODIUM 5 MG/1
5 TABLET ORAL
COMMUNITY

## 2021-01-01 RX ORDER — MORPHINE SULFATE 20 MG/ML
5 SOLUTION ORAL
Qty: 30 ML | Refills: 0 | Status: SHIPPED | OUTPATIENT
Start: 2021-01-01

## 2021-01-01 RX ORDER — MELPHALAN USP, 2 MG 2 MG/1
10 TABLET ORAL DAILY
Qty: 20 TABLET | Refills: 3 | Status: SHIPPED | OUTPATIENT
Start: 2021-01-01 | End: 2021-01-01

## 2021-01-01 RX ORDER — MEPERIDINE HYDROCHLORIDE 50 MG/ML
25 INJECTION INTRAMUSCULAR; INTRAVENOUS; SUBCUTANEOUS ONCE
Status: CANCELLED | OUTPATIENT
Start: 2021-01-01 | End: 2021-01-01

## 2021-01-01 RX ORDER — LENALIDOMIDE 25 MG/1
25 CAPSULE ORAL DAILY
Qty: 21 CAPSULE | Refills: 0 | Status: SHIPPED | OUTPATIENT
Start: 2021-01-01 | End: 2021-01-01 | Stop reason: SDUPTHER

## 2021-01-01 RX ORDER — DIPHENHYDRAMINE HCL 25 MG
25 CAPSULE ORAL ONCE
Status: CANCELLED | OUTPATIENT
Start: 2021-01-01 | End: 2021-01-01

## 2021-01-01 RX ORDER — SODIUM CHLORIDE 9 MG/ML
500 INJECTION, SOLUTION INTRAVENOUS ONCE
Status: CANCELLED | OUTPATIENT
Start: 2021-01-01 | End: 2021-01-01

## 2021-01-01 RX ORDER — PANTOPRAZOLE SODIUM 40 MG/1
40 TABLET, DELAYED RELEASE ORAL DAILY
Qty: 90 TABLET | Refills: 3 | Status: SHIPPED | OUTPATIENT
Start: 2021-01-01

## 2021-01-01 RX ADMIN — ACETAMINOPHEN 1000 MG: 500 TABLET, FILM COATED ORAL at 11:08

## 2021-01-01 RX ADMIN — ACETAMINOPHEN 650 MG: 325 TABLET, FILM COATED ORAL at 10:26

## 2021-01-01 RX ADMIN — DIPHENHYDRAMINE HYDROCHLORIDE 25 MG: 50 INJECTION INTRAMUSCULAR; INTRAVENOUS at 11:37

## 2021-01-01 RX ADMIN — SODIUM CHLORIDE 1000 ML: 9 INJECTION, SOLUTION INTRAVENOUS at 10:23

## 2021-01-01 RX ADMIN — MONTELUKAST 10 MG: 10 TABLET, FILM COATED ORAL at 10:37

## 2021-01-01 RX ADMIN — METHYLPREDNISOLONE SODIUM SUCCINATE 60 MG: 125 INJECTION, POWDER, FOR SOLUTION INTRAMUSCULAR; INTRAVENOUS at 09:37

## 2021-01-01 RX ADMIN — FUROSEMIDE 20 MG: 10 INJECTION, SOLUTION INTRAMUSCULAR; INTRAVENOUS at 11:44

## 2021-01-01 RX ADMIN — SODIUM CHLORIDE 500 ML: 9 INJECTION, SOLUTION INTRAVENOUS at 10:34

## 2021-01-01 RX ADMIN — SODIUM CHLORIDE 250 ML: 9 INJECTION, SOLUTION INTRAVENOUS at 10:20

## 2021-01-01 RX ADMIN — DIPHENHYDRAMINE HYDROCHLORIDE 25 MG: 50 INJECTION INTRAMUSCULAR; INTRAVENOUS at 11:11

## 2021-01-01 RX ADMIN — METHYLPREDNISOLONE SODIUM SUCCINATE 60 MG: 125 INJECTION, POWDER, FOR SOLUTION INTRAMUSCULAR; INTRAVENOUS at 09:13

## 2021-01-01 RX ADMIN — METHYLPREDNISOLONE SODIUM SUCCINATE 60 MG: 125 INJECTION, POWDER, FOR SOLUTION INTRAMUSCULAR; INTRAVENOUS at 09:43

## 2021-01-01 RX ADMIN — METHYLPREDNISOLONE SODIUM SUCCINATE 60 MG: 125 INJECTION, POWDER, FOR SOLUTION INTRAMUSCULAR; INTRAVENOUS at 10:38

## 2021-01-01 RX ADMIN — CARFILZOMIB 144 MG: 30 INJECTION, POWDER, LYOPHILIZED, FOR SOLUTION INTRAVENOUS at 10:35

## 2021-01-01 RX ADMIN — ACETAMINOPHEN 1000 MG: 500 TABLET ORAL at 12:34

## 2021-01-01 RX ADMIN — CARFILZOMIB 144 MG: 30 INJECTION, POWDER, LYOPHILIZED, FOR SOLUTION INTRAVENOUS at 13:04

## 2021-01-01 RX ADMIN — DENOSUMAB 120 MG: 120 INJECTION SUBCUTANEOUS at 10:55

## 2021-01-01 RX ADMIN — ACETAMINOPHEN 1000 MG: 500 TABLET, FILM COATED ORAL at 10:47

## 2021-01-01 RX ADMIN — DARATUMUMAB AND HYALURONIDASE-FIHJ (HUMAN RECOMBINANT) 1800 MG: 1800; 30000 INJECTION SUBCUTANEOUS at 12:53

## 2021-01-01 RX ADMIN — ACETAMINOPHEN 1000 MG: 500 TABLET ORAL at 10:01

## 2021-01-01 RX ADMIN — DARATUMUMAB AND HYALURONIDASE-FIHJ (HUMAN RECOMBINANT) 1800 MG: 1800; 30000 INJECTION SUBCUTANEOUS at 11:54

## 2021-01-01 RX ADMIN — DIPHENHYDRAMINE HYDROCHLORIDE 25 MG: 50 INJECTION INTRAMUSCULAR; INTRAVENOUS at 10:03

## 2021-01-01 RX ADMIN — DIPHENHYDRAMINE HYDROCHLORIDE 25 MG: 50 INJECTION INTRAMUSCULAR; INTRAVENOUS at 09:48

## 2021-01-01 RX ADMIN — DIPHENHYDRAMINE HYDROCHLORIDE 25 MG: 50 INJECTION INTRAMUSCULAR; INTRAVENOUS at 12:37

## 2021-01-01 RX ADMIN — DENOSUMAB 120 MG: 120 INJECTION SUBCUTANEOUS at 12:09

## 2021-01-01 RX ADMIN — ACETAMINOPHEN 1000 MG: 500 TABLET ORAL at 09:40

## 2021-01-01 RX ADMIN — ACETAMINOPHEN 1000 MG: 500 TABLET ORAL at 10:36

## 2021-01-01 RX ADMIN — CARFILZOMIB 144 MG: 30 INJECTION, POWDER, LYOPHILIZED, FOR SOLUTION INTRAVENOUS at 11:43

## 2021-01-01 RX ADMIN — METHYLPREDNISOLONE SODIUM SUCCINATE 60 MG: 125 INJECTION, POWDER, FOR SOLUTION INTRAMUSCULAR; INTRAVENOUS at 10:01

## 2021-01-01 RX ADMIN — DENOSUMAB 120 MG: 120 INJECTION SUBCUTANEOUS at 10:30

## 2021-01-01 RX ADMIN — CARFILZOMIB 144 MG: 30 INJECTION, POWDER, LYOPHILIZED, FOR SOLUTION INTRAVENOUS at 11:44

## 2021-01-01 RX ADMIN — DIPHENHYDRAMINE HYDROCHLORIDE 25 MG: 25 CAPSULE ORAL at 09:04

## 2021-01-01 RX ADMIN — CARFILZOMIB 144 MG: 30 INJECTION, POWDER, LYOPHILIZED, FOR SOLUTION INTRAVENOUS at 11:47

## 2021-01-01 RX ADMIN — DENOSUMAB 120 MG: 120 INJECTION SUBCUTANEOUS at 14:25

## 2021-01-01 RX ADMIN — DENOSUMAB 120 MG: 120 INJECTION SUBCUTANEOUS at 12:33

## 2021-01-01 RX ADMIN — DARATUMUMAB AND HYALURONIDASE-FIHJ (HUMAN RECOMBINANT) 1800 MG: 1800; 30000 INJECTION SUBCUTANEOUS at 11:14

## 2021-01-01 RX ADMIN — DARATUMUMAB AND HYALURONIDASE-FIHJ (HUMAN RECOMBINANT) 1800 MG: 1800; 30000 INJECTION SUBCUTANEOUS at 11:32

## 2021-01-01 RX ADMIN — DENOSUMAB 120 MG: 120 INJECTION SUBCUTANEOUS at 12:29

## 2021-01-01 RX ADMIN — DIPHENHYDRAMINE HYDROCHLORIDE 25 MG: 50 INJECTION INTRAMUSCULAR; INTRAVENOUS at 11:04

## 2021-01-01 RX ADMIN — MEPERIDINE HYDROCHLORIDE 25 MG: 25 INJECTION INTRAMUSCULAR; INTRAVENOUS; SUBCUTANEOUS at 08:04

## 2021-01-01 RX ADMIN — ACETAMINOPHEN 1000 MG: 500 TABLET ORAL at 10:39

## 2021-01-01 RX ADMIN — DIPHENHYDRAMINE HYDROCHLORIDE 25 MG: 25 CAPSULE ORAL at 08:19

## 2021-01-01 RX ADMIN — ACETAMINOPHEN 650 MG: 325 TABLET ORAL at 09:04

## 2021-01-01 RX ADMIN — DIPHENHYDRAMINE HYDROCHLORIDE 25 MG: 50 INJECTION INTRAMUSCULAR; INTRAVENOUS at 10:06

## 2021-01-01 RX ADMIN — ACETAMINOPHEN 1000 MG: 500 TABLET ORAL at 09:54

## 2021-01-01 RX ADMIN — DIPHENHYDRAMINE HYDROCHLORIDE 25 MG: 50 INJECTION INTRAMUSCULAR; INTRAVENOUS at 10:05

## 2021-01-01 RX ADMIN — CARFILZOMIB 144 MG: 30 INJECTION, POWDER, LYOPHILIZED, FOR SOLUTION INTRAVENOUS at 10:17

## 2021-01-01 RX ADMIN — ACETAMINOPHEN 1000 MG: 500 TABLET, FILM COATED ORAL at 11:31

## 2021-01-01 RX ADMIN — CARFILZOMIB 144 MG: 30 INJECTION, POWDER, LYOPHILIZED, FOR SOLUTION INTRAVENOUS at 10:16

## 2021-01-01 RX ADMIN — DIPHENHYDRAMINE HYDROCHLORIDE 25 MG: 50 INJECTION INTRAMUSCULAR; INTRAVENOUS at 08:01

## 2021-01-01 RX ADMIN — DIPHENHYDRAMINE HYDROCHLORIDE 25 MG: 50 INJECTION INTRAMUSCULAR; INTRAVENOUS at 09:18

## 2021-01-01 RX ADMIN — METHYLPREDNISOLONE SODIUM SUCCINATE 60 MG: 125 INJECTION, POWDER, FOR SOLUTION INTRAMUSCULAR; INTRAVENOUS at 10:57

## 2021-01-01 RX ADMIN — DENOSUMAB 120 MG: 120 INJECTION SUBCUTANEOUS at 09:46

## 2021-01-01 RX ADMIN — DARATUMUMAB AND HYALURONIDASE-FIHJ (HUMAN RECOMBINANT) 1800 MG: 1800; 30000 INJECTION SUBCUTANEOUS at 11:00

## 2021-01-01 RX ADMIN — DENOSUMAB 120 MG: 120 INJECTION SUBCUTANEOUS at 09:35

## 2021-01-01 RX ADMIN — DARATUMUMAB AND HYALURONIDASE-FIHJ (HUMAN RECOMBINANT) 1800 MG: 1800; 30000 INJECTION SUBCUTANEOUS at 14:28

## 2021-01-01 RX ADMIN — DIPHENHYDRAMINE HYDROCHLORIDE 25 MG: 50 INJECTION INTRAMUSCULAR; INTRAVENOUS at 11:14

## 2021-01-01 RX ADMIN — SODIUM CHLORIDE 1000 ML: 9 INJECTION, SOLUTION INTRAVENOUS at 09:59

## 2021-01-01 RX ADMIN — DIPHENHYDRAMINE HYDROCHLORIDE 25 MG: 50 INJECTION INTRAMUSCULAR; INTRAVENOUS at 09:41

## 2021-01-01 RX ADMIN — METHYLPREDNISOLONE SODIUM SUCCINATE 60 MG: 125 INJECTION, POWDER, FOR SOLUTION INTRAMUSCULAR; INTRAVENOUS at 11:01

## 2021-01-01 RX ADMIN — DIPHENHYDRAMINE HYDROCHLORIDE 25 MG: 50 INJECTION INTRAMUSCULAR; INTRAVENOUS at 10:43

## 2021-01-01 RX ADMIN — SODIUM CHLORIDE 1000 ML: 9 INJECTION, SOLUTION INTRAVENOUS at 11:31

## 2021-01-01 RX ADMIN — SODIUM CHLORIDE 250 ML: 9 INJECTION, SOLUTION INTRAVENOUS at 12:19

## 2021-01-01 RX ADMIN — ACETAMINOPHEN 1000 MG: 500 TABLET ORAL at 09:43

## 2021-01-01 RX ADMIN — DARATUMUMAB AND HYALURONIDASE-FIHJ (HUMAN RECOMBINANT) 1800 MG: 1800; 30000 INJECTION SUBCUTANEOUS at 11:02

## 2021-01-01 RX ADMIN — DARATUMUMAB AND HYALURONIDASE-FIHJ (HUMAN RECOMBINANT) 1800 MG: 1800; 30000 INJECTION SUBCUTANEOUS at 11:53

## 2021-01-01 RX ADMIN — METHYLPREDNISOLONE SODIUM SUCCINATE 100 MG: 125 INJECTION, POWDER, FOR SOLUTION INTRAMUSCULAR; INTRAVENOUS at 10:01

## 2021-01-01 RX ADMIN — CARFILZOMIB 42 MG: 60 INJECTION, POWDER, LYOPHILIZED, FOR SOLUTION INTRAVENOUS at 11:29

## 2021-01-01 RX ADMIN — SODIUM CHLORIDE 250 ML: 9 INJECTION, SOLUTION INTRAVENOUS at 10:36

## 2021-01-01 RX ADMIN — ACETAMINOPHEN 1000 MG: 500 TABLET ORAL at 10:05

## 2021-01-01 RX ADMIN — DIPHENHYDRAMINE HYDROCHLORIDE 50 MG: 50 INJECTION INTRAMUSCULAR; INTRAVENOUS at 10:06

## 2021-01-01 RX ADMIN — SODIUM CHLORIDE 250 ML: 9 INJECTION, SOLUTION INTRAVENOUS at 11:05

## 2021-01-01 RX ADMIN — DENOSUMAB 120 MG: 120 INJECTION SUBCUTANEOUS at 12:28

## 2021-01-01 RX ADMIN — ACETAMINOPHEN 1000 MG: 500 TABLET ORAL at 10:56

## 2021-01-01 RX ADMIN — DIPHENHYDRAMINE HYDROCHLORIDE 25 MG: 50 INJECTION INTRAMUSCULAR; INTRAVENOUS at 09:42

## 2021-01-01 RX ADMIN — DIPHENHYDRAMINE HYDROCHLORIDE 25 MG: 50 INJECTION INTRAMUSCULAR; INTRAVENOUS at 10:57

## 2021-01-01 RX ADMIN — ACETAMINOPHEN 650 MG: 325 TABLET, FILM COATED ORAL at 08:18

## 2021-01-01 RX ADMIN — SODIUM CHLORIDE 500 ML: 9 INJECTION, SOLUTION INTRAVENOUS at 11:58

## 2021-01-01 RX ADMIN — METHYLPREDNISOLONE SODIUM SUCCINATE 60 MG: 125 INJECTION, POWDER, FOR SOLUTION INTRAMUSCULAR; INTRAVENOUS at 12:35

## 2021-01-01 RX ADMIN — FUROSEMIDE 20 MG: 10 INJECTION, SOLUTION INTRAMUSCULAR; INTRAVENOUS at 11:24

## 2021-01-01 RX ADMIN — METHYLPREDNISOLONE SODIUM SUCCINATE 60 MG: 125 INJECTION, POWDER, FOR SOLUTION INTRAMUSCULAR; INTRAVENOUS at 10:39

## 2021-01-01 RX ADMIN — SODIUM CHLORIDE 1000 ML: 9 INJECTION, SOLUTION INTRAVENOUS at 10:52

## 2021-01-01 RX ADMIN — DARATUMUMAB AND HYALURONIDASE-FIHJ (HUMAN RECOMBINANT) 1800 MG: 1800; 30000 INJECTION SUBCUTANEOUS at 12:16

## 2021-01-01 RX ADMIN — DARATUMUMAB AND HYALURONIDASE-FIHJ (HUMAN RECOMBINANT) 1800 MG: 1800; 30000 INJECTION SUBCUTANEOUS at 11:05

## 2021-01-01 RX ADMIN — FUROSEMIDE 40 MG: 10 INJECTION, SOLUTION INTRAMUSCULAR; INTRAVENOUS at 12:27

## 2021-01-01 RX ADMIN — SODIUM CHLORIDE 250 ML: 9 INJECTION, SOLUTION INTRAVENOUS at 14:35

## 2021-01-01 RX ADMIN — SODIUM CHLORIDE 250 ML: 9 INJECTION, SOLUTION INTRAVENOUS at 11:47

## 2021-01-01 RX ADMIN — METHYLPREDNISOLONE SODIUM SUCCINATE 60 MG: 125 INJECTION, POWDER, FOR SOLUTION INTRAMUSCULAR; INTRAVENOUS at 09:38

## 2021-01-01 RX ADMIN — CARFILZOMIB 144 MG: 30 INJECTION, POWDER, LYOPHILIZED, FOR SOLUTION INTRAVENOUS at 11:06

## 2021-01-01 RX ADMIN — DIPHENHYDRAMINE HYDROCHLORIDE 50 MG: 50 INJECTION INTRAMUSCULAR; INTRAVENOUS at 10:32

## 2021-01-01 RX ADMIN — DARATUMUMAB AND HYALURONIDASE-FIHJ (HUMAN RECOMBINANT) 1800 MG: 1800; 30000 INJECTION SUBCUTANEOUS at 11:31

## 2021-01-01 RX ADMIN — CARFILZOMIB 144 MG: 30 INJECTION, POWDER, LYOPHILIZED, FOR SOLUTION INTRAVENOUS at 11:18

## 2021-01-01 RX ADMIN — ACETAMINOPHEN 1000 MG: 500 TABLET, FILM COATED ORAL at 10:00

## 2021-01-01 RX ADMIN — DARATUMUMAB AND HYALURONIDASE-FIHJ (HUMAN RECOMBINANT) 1800 MG: 1800; 30000 INJECTION SUBCUTANEOUS at 10:27

## 2021-01-01 RX ADMIN — CARFILZOMIB 144 MG: 30 INJECTION, POWDER, LYOPHILIZED, FOR SOLUTION INTRAVENOUS at 10:08

## 2021-01-01 RX ADMIN — DIPHENHYDRAMINE HYDROCHLORIDE 25 MG: 25 CAPSULE ORAL at 10:26

## 2021-01-01 RX ADMIN — SODIUM CHLORIDE 1000 ML/HR: 9 INJECTION, SOLUTION INTRAVENOUS at 09:31

## 2021-01-01 RX ADMIN — DENOSUMAB 120 MG: 120 INJECTION SUBCUTANEOUS at 09:50

## 2021-01-01 RX ADMIN — SODIUM CHLORIDE 250 ML: 9 INJECTION, SOLUTION INTRAVENOUS at 09:00

## 2021-01-01 RX ADMIN — METHYLPREDNISOLONE SODIUM SUCCINATE 60 MG: 125 INJECTION, POWDER, FOR SOLUTION INTRAMUSCULAR; INTRAVENOUS at 09:54

## 2021-01-01 RX ADMIN — DARATUMUMAB AND HYALURONIDASE-FIHJ (HUMAN RECOMBINANT) 1800 MG: 1800; 30000 INJECTION SUBCUTANEOUS at 11:22

## 2021-01-01 RX ADMIN — ACETAMINOPHEN 1000 MG: 500 TABLET, FILM COATED ORAL at 11:06

## 2021-01-01 RX ADMIN — METHYLPREDNISOLONE SODIUM SUCCINATE 60 MG: 125 INJECTION, POWDER, FOR SOLUTION INTRAMUSCULAR; INTRAVENOUS at 11:08

## 2021-01-01 RX ADMIN — METHYLPREDNISOLONE SODIUM SUCCINATE 60 MG: 125 INJECTION, POWDER, FOR SOLUTION INTRAMUSCULAR; INTRAVENOUS at 11:32

## 2021-01-01 RX ADMIN — DARATUMUMAB AND HYALURONIDASE-FIHJ (HUMAN RECOMBINANT) 1800 MG: 1800; 30000 INJECTION SUBCUTANEOUS at 12:44

## 2021-01-01 RX ADMIN — METHYLPREDNISOLONE SODIUM SUCCINATE 100 MG: 125 INJECTION, POWDER, FOR SOLUTION INTRAMUSCULAR; INTRAVENOUS at 10:31

## 2021-01-01 RX ADMIN — ACETAMINOPHEN 1000 MG: 500 TABLET ORAL at 09:13

## 2021-01-01 RX ADMIN — CARFILZOMIB 144 MG: 30 INJECTION, POWDER, LYOPHILIZED, FOR SOLUTION INTRAVENOUS at 10:57

## 2021-01-01 RX ADMIN — CARFILZOMIB 144 MG: 30 INJECTION, POWDER, LYOPHILIZED, FOR SOLUTION INTRAVENOUS at 10:27

## 2021-01-01 RX ADMIN — SODIUM CHLORIDE 250 ML: 9 INJECTION, SOLUTION INTRAVENOUS at 12:28

## 2021-01-01 RX ADMIN — METHYLPREDNISOLONE SODIUM SUCCINATE 60 MG: 125 INJECTION, POWDER, FOR SOLUTION INTRAMUSCULAR; INTRAVENOUS at 10:47

## 2021-01-01 RX ADMIN — DIPHENHYDRAMINE HYDROCHLORIDE 25 MG: 50 INJECTION INTRAMUSCULAR; INTRAVENOUS at 11:01

## 2021-01-01 RX ADMIN — SODIUM CHLORIDE 500 ML: 9 INJECTION, SOLUTION INTRAVENOUS at 08:00

## 2021-01-01 RX ADMIN — CARFILZOMIB 144 MG: 30 INJECTION, POWDER, LYOPHILIZED, FOR SOLUTION INTRAVENOUS at 09:42

## 2021-01-01 RX ADMIN — DARATUMUMAB AND HYALURONIDASE-FIHJ (HUMAN RECOMBINANT) 1800 MG: 1800; 30000 INJECTION SUBCUTANEOUS at 12:22

## 2021-01-01 RX ADMIN — CARFILZOMIB 144 MG: 30 INJECTION, POWDER, LYOPHILIZED, FOR SOLUTION INTRAVENOUS at 10:29

## 2021-01-01 RX ADMIN — ACETAMINOPHEN 1000 MG: 500 TABLET ORAL at 11:01

## 2021-01-01 RX ADMIN — CARFILZOMIB 144 MG: 30 INJECTION, POWDER, LYOPHILIZED, FOR SOLUTION INTRAVENOUS at 10:43

## 2021-01-01 RX ADMIN — ACETAMINOPHEN 1000 MG: 500 TABLET ORAL at 09:59

## 2021-01-01 RX ADMIN — DARATUMUMAB AND HYALURONIDASE-FIHJ (HUMAN RECOMBINANT) 1800 MG: 1800; 30000 INJECTION SUBCUTANEOUS at 12:09

## 2021-01-01 RX ADMIN — METHYLPREDNISOLONE SODIUM SUCCINATE 60 MG: 125 INJECTION, POWDER, FOR SOLUTION INTRAMUSCULAR; INTRAVENOUS at 10:02

## 2021-01-08 NOTE — TELEPHONE ENCOUNTER
----- Message from Gene Bond sent at 1/7/2021  2:31 PM EST -----  Regarding: Referral Request  Contact: [phone]  I would like for Dr. Lindsay to refers me to see a Netologist. I found one, his name is Dr. Eng. If he knows a better one, I would go see him as well. I am having severe neuropathy in both of my fret, I don't know if Chemo caused it or what. I am unable to walk at times it is so severe. Please help me with this problem. I am in a terrible shape. And I am restarting Chemo, on Jan 8,so I figure it will just get worse, I don't know if I can tolerate much more of this. I can, but I am trying to make sure you know how painful this is.

## 2021-02-05 NOTE — PROGRESS NOTES
PROBLEM LIST:  Oncology/Hematology History   Multiple myeloma not having achieved remission (CMS/HCC)   8/18/2020 Initial Diagnosis    Multiple myeloma not having achieved remission (CMS/HCC)     8/20/2020 Cancer Staged    Staging form: Plasma Cell Myeloma And Plasma Cell Disorders, AJCC 8th Edition  - Clinical stage from 8/20/2020: RISS Stage III (Beta-2-microglobulin (mg/L): 16, Albumin (g/dL): 2.7, ISS: Stage III, High-risk cytogenetics: Present, LDH: Normal) - Signed by Merry Pelletier MD on 8/24/2020 8/27/2020 - 1/7/2021 Chemotherapy    OP MULTIPLE MYELOMA Daratumumab / Bortezomib / Revlimid/Dexamethasone     1/8/2021 -  Chemotherapy    OP MULTIPLE MYELOMA Daratumumab / Lenalidomide / Dexamethasone     1/8/2021 -  Chemotherapy    OP SUPPORTIVE Denosumab (Xgeva) Q28D         REASON FOR VISIT: Newly diagnosed Myeloma    HISTORY OF PRESENT ILLNESS:   65 y.o.  male presents today for follow-up.  He underwent induction chemotherapy with Darzalek Velcade Revlimid and dexamethasone.  He completed 4 cycles with a very nice response.  He underwent a transplant evaluation.  Unfortunately due to suppressed ejection fraction of 40% he was deemed not a candidate for transplant.  Subsequently in December I started him back on treatment with daratumumab, lenalidomide and dexamethasone.  I held his Velcade at that time.    He is doing reasonably well.  Relatively asymptomatic from his disease.  Denies any recent infections.  Awaiting the Covid vaccine.  But otherwise doing well.  No significant neuropathy.    Past medical history, social history and family history was reviewed and unchanged from prior visit.    Review of Systems:    Review of Systems   Constitutional: Positive for fatigue.   HENT:  Negative.    Eyes: Negative.    Respiratory: Negative.    Cardiovascular: Negative.    Gastrointestinal: Negative.    Endocrine: Negative.    Genitourinary: Negative.     Musculoskeletal: Negative.    Skin: Negative.     Neurological: Negative.    Hematological: Negative.    Psychiatric/Behavioral: Negative.             Medications:        Current Outpatient Medications:   •  acyclovir (ZOVIRAX) 400 MG tablet, Take 1 tablet by mouth 2 (Two) Times a Day. Take no more than 5 doses a day., Disp: 60 tablet, Rfl: 11  •  aspirin 81 MG EC tablet, Take 81 mg by mouth Daily., Disp: , Rfl:   •  atorvastatin (LIPITOR) 80 MG tablet, TAKE 1 TABLET BY MOUTH  EVERY NIGHT, Disp: 90 tablet, Rfl: 3  •  dexamethasone (DECADRON) 4 MG tablet, Take 10 tabs on days 1,8,15,22. Take 1 tab on day 2,3,4,5,6,7,9,10,11,12,13,14,16,18,19,20,21,23,24,, Disp: 48 tablet, Rfl: 11  •  fluocinolone (SYNALAR) 0.01 % external solution, Apply  topically to the appropriate area as directed 2 (Two) Times a Day., Disp: 60 mL, Rfl: 11  •  gabapentin (NEURONTIN) 800 MG tablet, Take 800 mg by mouth 3 (Three) Times a Day., Disp: , Rfl:   •  glimepiride (AMARYL) 2 MG tablet, Take 1 tablet by mouth Daily., Disp: 90 tablet, Rfl: 1  •  glucose blood (OneTouch Ultra) test strip, 1 each by Other route 2 (Two) Times a Day. to check glucose     DX  E11.9, Disp: 100 each, Rfl: 11  •  HumuLIN R 100 UNIT/ML injection, USE ACCORDING TO SLIDING SCALE AT DISCHARGE, Disp: , Rfl:   •  Insulin Glargine (BASAGLAR KWIKPEN) 100 UNIT/ML injection pen, Inject 20 Units under the skin into the appropriate area as directed Daily., Disp: 3 pen, Rfl: 5  •  Insulin Pen Needle 31G X 6 MM misc, 1 stick Daily., Disp: 90 each, Rfl: 0  •  lenalidomide (Revlimid) 25 MG capsule, Take 1 capsule by mouth Daily. Daily Days 1-21 then 7 days off. Adult male REMS [id number], Disp: 21 capsule, Rfl: 0  •  lisinopril (PRINIVIL,ZESTRIL) 10 MG tablet, Take 1 tablet by mouth Daily., Disp: 90 tablet, Rfl: 3  •  metFORMIN (GLUCOPHAGE) 1000 MG tablet, TAKE 1 TABLET BY MOUTH TWO  TIMES DAILY WITH MEALS, Disp: 180 tablet, Rfl: 3  •  methadone (DOLOPHINE) 10 MG tablet, Take 30 mg by mouth Every 6 (Six) Hours As Needed for Moderate  "Pain ,, Disp: , Rfl:   •  metoprolol tartrate (LOPRESSOR) 25 MG tablet, TAKE 1 TABLET BY MOUTH TWO  TIMES DAILY, Disp: 180 tablet, Rfl: 3  •  ondansetron (Zofran) 8 MG tablet, Take 1 tablet by mouth Every 8 (Eight) Hours As Needed for Nausea or Vomiting., Disp: 30 tablet, Rfl: 11  •  OneTouch Delica Lancets 33G misc, 1 application 3 (Three) Times a Day. Check blood sugar three times daily, E11.9, Disp: 100 each, Rfl: 11  •  pantoprazole (PROTONIX) 40 MG EC tablet, TAKE 1 TABLET BY MOUTH  DAILY, Disp: 90 tablet, Rfl: 3  •  sulfamethoxazole-trimethoprim (BACTRIM DS,SEPTRA DS) 800-160 MG per tablet, Take 1 tab 3 times a day on Monday Wednesday and Friday., Disp: 60 tablet, Rfl: 11  •  tiZANidine (ZANAFLEX) 4 MG tablet, Take 4 mg by mouth 3 (Three) Times a Day As Needed for Muscle Spasms., Disp: , Rfl:   •  warfarin (COUMADIN) 4 MG tablet, Take 1 tablet by mouth Daily. Resume 10/1/18. 4mg daily but Take 7.5 tablet usually on Friday one time dose, Disp: 90 tablet, Rfl: 1    Pain Medications             aspirin 81 MG EC tablet Take 81 mg by mouth Daily.    dexamethasone (DECADRON) 4 MG tablet Take 10 tabs on days 1,8,15,22. Take 1 tab on day 2,3,4,5,6,7,9,10,11,12,13,14,16,18,19,20,21,23,24,    gabapentin (NEURONTIN) 800 MG tablet Take 800 mg by mouth 3 (Three) Times a Day.    methadone (DOLOPHINE) 10 MG tablet Take 30 mg by mouth Every 6 (Six) Hours As Needed for Moderate Pain ,    tiZANidine (ZANAFLEX) 4 MG tablet Take 4 mg by mouth 3 (Three) Times a Day As Needed for Muscle Spasms.             ALLERGIES:  No Known Allergies      Physical Exam    VITAL SIGNS:  /54   Pulse 63   Temp 97.3 °F (36.3 °C) (Temporal)   Resp 20   Ht 175.3 cm (69\")   Wt 89.4 kg (197 lb)   SpO2 98%   BMI 29.09 kg/m²     Wt Readings from Last 3 Encounters:   02/05/21 89.4 kg (197 lb)   01/22/21 88.5 kg (195 lb)   01/08/21 87.5 kg (193 lb)       Body mass index is 29.09 kg/m². Body surface area is 2.05 meters squared.    Pain Score    " 02/05/21 0926   PainSc: 0-No pain         Performance Status: 0    General: well appearing, in no acute distress  HEENT: sclera anicteric, neck is supple  Lymphatics: no cervical, supraclavicular, or axillary adenopathy  Cardiovascular: regular rate and rhythm, no murmurs, rubs or gallops  Lungs: clear to auscultation bilaterally  Abdomen: soft, nontender, nondistended.  No palpable organomegaly  Extremities: no lower extremity edema  Skin: no rashes, lesions, bruising, or petechiae  Msk:  Shows no weakness of the large muscle groups  Psych: Mood is stable    Lab Results   Component Value Date    FINALDX  08/13/2020      PERIPHERAL BLOOD SMEAR, BONE MARROW ASPIRATE, CLOT SECTION AND BIOPSY WITH FLOW CYTOMETRY, IMMUNOHISTOCHEMISTRY AND IN-SITU HYBRIDIZATION STUDIES:  Pancytopenia.  Involved by CD56 positive lambda clonal plasma cell neoplasm accounting for approximately 50% of marrow cellularity.  Mild reticulin fibrosis identified (MF-1).  No amyloid deposition identified on congo red stain with appropriate control.      PCC/dlb             RECENT LABS:    Lab Results   Component Value Date    HGB 8.5 (L) 01/22/2021    HCT 26.3 (L) 01/22/2021    MCV 99.6 (H) 01/22/2021    PLT 82 (L) 01/22/2021    WBC 5.12 01/22/2021    NEUTROABS 2.25 01/22/2021    LYMPHSABS 2.21 01/22/2021    MONOSABS 0.52 01/22/2021    EOSABS 0.11 01/22/2021    BASOSABS 0.01 01/22/2021       Lab Results   Component Value Date    GLUCOSE 85 01/08/2021    BUN 21 01/08/2021    CREATININE 1.12 01/08/2021     01/08/2021    K 3.7 01/08/2021    CL 98 01/08/2021    CO2 27.3 01/08/2021    CALCIUM 9.0 01/08/2021    PROTEINTOT 6.9 01/08/2021    ALBUMIN 3.3 01/08/2021    ALBUMIN 4.10 01/08/2021    BILITOT 0.7 01/08/2021    ALKPHOS 100 01/08/2021    AST 15 01/08/2021    ALT 10 01/08/2021     Lab Results   Component Value Date    MSPIKE 0.9 01/08/2021    MSPIKE NOT QUANTIFIABLE 10/30/2020    MSPIKE 0.5 10/09/2020    MSPIKE 2.0 09/18/2020          MSPIKE         6.0              08/21/2020    Lab Results   Component Value Date    FREEKAPPAL 6.8 01/08/2021    FREEKAPPAL 12.6 10/30/2020    FREEKAPPAL 14.7 10/09/2020     Lab Results   Component Value Date    IGLFLC 241.4 (H) 01/08/2021    IGLFLC 13.1 10/30/2020    IGLFLC 13.2 10/09/2020         IGLFLC                                                         1173                                 08/21/2020        Assessment/Plan    1.  Multiple myeloma with renal dysfunction and pancytopenia.  The holiday that he took from chemotherapy showed that the disease had returned.  He definitely has significant high risk disease.  At this point I am going to continue  continue with Darzalek Revlimid and Dex.  I will hold the Velcade for now.  He will continue with this regimen as maintenance.  I am going to also place him on Xgeva monthly.  I will likely extended to every 3 months after completing 6 treatments.  My concern is obviously control of his disease at this point.  We will see how he does going forward.    3.  Renal dysfunction secondary to myeloma.  Stable.    4.  History of multiple DVTs.  Patient on Coumadin    5.  CAD with depressed EF of 40%    Highly complex patient with discussion regarding findings of his last testing and treatment options going forward.This visit addresses a chronic illness that poses a threat to life on drug therapy requiring intensive monitoring for toxicity and warrants a level 5 MDM.      Merry Pelletier MD  [facility] Hematology and Oncology    Return on: 03/05/21  Return in (Approximately): 1 month, Schedule with next infusion    Orders Placed This Encounter   Procedures   • DORA, PE & Free LT Chains, Ser   • Comprehensive metabolic panel   • CBC and Differential   • CBC and Differential       2/5/2021

## 2021-02-24 NOTE — PROGRESS NOTES
PROBLEM LIST:  Oncology/Hematology History   Multiple myeloma not having achieved remission (CMS/HCC)   8/18/2020 Initial Diagnosis    Multiple myeloma not having achieved remission (CMS/HCC)     8/20/2020 Cancer Staged    Staging form: Plasma Cell Myeloma And Plasma Cell Disorders, AJCC 8th Edition  - Clinical stage from 8/20/2020: RISS Stage III (Beta-2-microglobulin (mg/L): 16, Albumin (g/dL): 2.7, ISS: Stage III, High-risk cytogenetics: Present, LDH: Normal) - Signed by Merry Pelletier MD on 8/24/2020 8/27/2020 - 1/7/2021 Chemotherapy    OP MULTIPLE MYELOMA Daratumumab / Bortezomib / Revlimid/Dexamethasone     1/8/2021 -  Chemotherapy    OP MULTIPLE MYELOMA Daratumumab / Lenalidomide / Dexamethasone     1/8/2021 -  Chemotherapy    OP SUPPORTIVE Denosumab (Xgeva) Q28D         REASON FOR VISIT: Newly diagnosed Myeloma    HISTORY OF PRESENT ILLNESS:   65 y.o.  male presents today for acute visit.  He showed up in clinic today complaining of bruising on his upper arms as well as on his leg.  No recent labs.  He is currently on treatment with daratumumab, lenalidomide and dexamethasone.   Denies any recent infections.  Awaiting the Covid vaccine.  No significant neuropathy.    Past medical history, social history and family history was reviewed and unchanged from prior visit.    Review of Systems:    Review of Systems   Constitutional: Positive for fatigue.   HENT:  Negative.    Eyes: Negative.    Respiratory: Negative.    Cardiovascular: Negative.    Gastrointestinal: Negative.    Endocrine: Negative.    Genitourinary: Negative.     Musculoskeletal: Negative.    Skin: Negative.    Neurological: Negative.    Hematological: Bruises/bleeds easily.   Psychiatric/Behavioral: Negative.             Medications:        Current Outpatient Medications:   •  acyclovir (ZOVIRAX) 400 MG tablet, Take 1 tablet by mouth 2 (Two) Times a Day. Take no more than 5 doses a day., Disp: 60 tablet, Rfl: 11  •  aspirin 81 MG EC  tablet, Take 81 mg by mouth Daily., Disp: , Rfl:   •  atorvastatin (LIPITOR) 80 MG tablet, TAKE 1 TABLET BY MOUTH  EVERY NIGHT, Disp: 90 tablet, Rfl: 3  •  dexamethasone (DECADRON) 4 MG tablet, Take 10 tabs on days 1,8,15,22. Take 1 tab on day 2,3,4,5,6,7,9,10,11,12,13,14,16,18,19,20,21,23,24,, Disp: 48 tablet, Rfl: 11  •  fluocinolone (SYNALAR) 0.01 % external solution, Apply  topically to the appropriate area as directed 2 (Two) Times a Day., Disp: 60 mL, Rfl: 11  •  gabapentin (NEURONTIN) 800 MG tablet, Take 800 mg by mouth 3 (Three) Times a Day., Disp: , Rfl:   •  glimepiride (AMARYL) 2 MG tablet, Take 1 tablet by mouth Daily., Disp: 90 tablet, Rfl: 1  •  glucose blood (OneTouch Ultra) test strip, 1 each by Other route 2 (Two) Times a Day. to check glucose     DX  E11.9, Disp: 100 each, Rfl: 11  •  HumuLIN R 100 UNIT/ML injection, USE ACCORDING TO SLIDING SCALE AT DISCHARGE, Disp: , Rfl:   •  Insulin Glargine (BASAGLAR KWIKPEN) 100 UNIT/ML injection pen, Inject 20 Units under the skin into the appropriate area as directed Daily., Disp: 3 pen, Rfl: 5  •  Insulin Pen Needle 31G X 6 MM misc, 1 stick Daily., Disp: 90 each, Rfl: 0  •  lenalidomide (REVLIMID) 25 MG capsule, Take 1 capsule by mouth Daily. Daily Days 1-21 then 7 days off. Adult male REMS [id number], Disp: 21 capsule, Rfl: 0  •  lisinopril (PRINIVIL,ZESTRIL) 10 MG tablet, Take 1 tablet by mouth Daily., Disp: 90 tablet, Rfl: 3  •  metFORMIN (GLUCOPHAGE) 1000 MG tablet, TAKE 1 TABLET BY MOUTH TWO  TIMES DAILY WITH MEALS, Disp: 180 tablet, Rfl: 3  •  methadone (DOLOPHINE) 10 MG tablet, Take 30 mg by mouth Every 6 (Six) Hours As Needed for Moderate Pain ,, Disp: , Rfl:   •  metoprolol tartrate (LOPRESSOR) 25 MG tablet, TAKE 1 TABLET BY MOUTH TWO  TIMES DAILY, Disp: 180 tablet, Rfl: 3  •  ondansetron (Zofran) 8 MG tablet, Take 1 tablet by mouth Every 8 (Eight) Hours As Needed for Nausea or Vomiting., Disp: 30 tablet, Rfl: 11  •  OneTouch Delica Lancets 33G misc,  "1 application 3 (Three) Times a Day. Check blood sugar three times daily, E11.9, Disp: 100 each, Rfl: 11  •  pantoprazole (PROTONIX) 40 MG EC tablet, TAKE 1 TABLET BY MOUTH  DAILY, Disp: 90 tablet, Rfl: 3  •  sulfamethoxazole-trimethoprim (BACTRIM DS,SEPTRA DS) 800-160 MG per tablet, Take 1 tab 3 times a day on Monday Wednesday and Friday., Disp: 60 tablet, Rfl: 11  •  tiZANidine (ZANAFLEX) 4 MG tablet, Take 4 mg by mouth 3 (Three) Times a Day As Needed for Muscle Spasms., Disp: , Rfl:   •  warfarin (COUMADIN) 4 MG tablet, Take 1 tablet by mouth Daily. Resume 10/1/18. 4mg daily but Take 7.5 tablet usually on Friday one time dose (Patient taking differently: Take 4 mg by mouth Daily. Resume 10/1/18. 4mg daily), Disp: 90 tablet, Rfl: 1    Pain Medications             aspirin 81 MG EC tablet Take 81 mg by mouth Daily.    dexamethasone (DECADRON) 4 MG tablet Take 10 tabs on days 1,8,15,22. Take 1 tab on day 2,3,4,5,6,7,9,10,11,12,13,14,16,18,19,20,21,23,24,    gabapentin (NEURONTIN) 800 MG tablet Take 800 mg by mouth 3 (Three) Times a Day.    methadone (DOLOPHINE) 10 MG tablet Take 30 mg by mouth Every 6 (Six) Hours As Needed for Moderate Pain ,    tiZANidine (ZANAFLEX) 4 MG tablet Take 4 mg by mouth 3 (Three) Times a Day As Needed for Muscle Spasms.             ALLERGIES:  No Known Allergies      Physical Exam    VITAL SIGNS:  /65   Pulse 65   Temp 97.3 °F (36.3 °C) (Temporal)   Resp 20   Ht 175.3 cm (69\")   Wt 88.5 kg (195 lb)   SpO2 98%   BMI 28.80 kg/m²     Wt Readings from Last 3 Encounters:   02/24/21 88.5 kg (195 lb)   02/19/21 86.6 kg (191 lb)   02/05/21 89.4 kg (197 lb)       Body mass index is 28.8 kg/m². Body surface area is 2.04 meters squared.    Pain Score    02/24/21 1015   PainSc: 0-No pain         Performance Status: 0    General: well appearing, in no acute distress  HEENT: sclera anicteric, neck is supple  Lymphatics: no cervical, supraclavicular, or axillary adenopathy  Cardiovascular: " regular rate and rhythm, no murmurs, rubs or gallops  Lungs: clear to auscultation bilaterally  Abdomen: soft, nontender, nondistended.  No palpable organomegaly  Extremities: no lower extremity edema  Skin: no rashes, lesions, bruising, or petechiae  Msk:  Shows no weakness of the large muscle groups  Psych: Mood is stable    Lab Results   Component Value Date    FINALDX  08/13/2020      PERIPHERAL BLOOD SMEAR, BONE MARROW ASPIRATE, CLOT SECTION AND BIOPSY WITH FLOW CYTOMETRY, IMMUNOHISTOCHEMISTRY AND IN-SITU HYBRIDIZATION STUDIES:  Pancytopenia.  Involved by CD56 positive lambda clonal plasma cell neoplasm accounting for approximately 50% of marrow cellularity.  Mild reticulin fibrosis identified (MF-1).  No amyloid deposition identified on congo red stain with appropriate control.      PCC/dlb             RECENT LABS:    Lab Results   Component Value Date    HGB 8.2 (L) 02/24/2021    HCT 25.7 (L) 02/24/2021    MCV 99.6 (H) 02/24/2021    PLT 22 (C) 02/24/2021    WBC 4.79 02/24/2021    NEUTROABS 1.76 02/24/2021    LYMPHSABS 2.33 02/24/2021    MONOSABS 0.41 02/24/2021    EOSABS 0.23 02/24/2021    BASOSABS 0.04 02/24/2021       Lab Results   Component Value Date    GLUCOSE 110 (H) 02/24/2021    BUN 18 02/24/2021    CREATININE 1.34 (H) 02/24/2021     02/24/2021    K 4.7 02/24/2021     02/24/2021    CO2 27.1 02/24/2021    CALCIUM 7.5 (L) 02/24/2021    PROTEINTOT 6.0 02/24/2021    ALBUMIN 3.70 02/24/2021    BILITOT 0.3 02/24/2021    ALKPHOS 62 02/24/2021    AST 11 02/24/2021    ALT 13 02/24/2021     Lab Results   Component Value Date    MSPIKE 0.9 01/08/2021    MSPIKE NOT QUANTIFIABLE 10/30/2020    MSPIKE 0.5 10/09/2020    MSPIKE 2.0 09/18/2020         MSPIKE         6.0              08/21/2020    Lab Results   Component Value Date    FREEKAPPAL 10.8 02/05/2021    FREEKAPPAL 6.8 01/08/2021    FREEKAPPAL 12.6 10/30/2020     Lab Results   Component Value Date    [facility]) 02/05/20[facility]    [facility] 241.4 (H)  01/08/2021    [facility] 13.1 10/30/2020         [facility]                                                         1173                                 08/21/2020        Assessment/Plan    1.  Multiple myeloma with renal dysfunction and pancytopenia.  He is currently on Darzalex Revlimid and Dex.  Velcade is on hold for now.      2.  Bruising.  I had the patient have labs prior to her appointment.  We discussed CMP showed a slightly low calcium level.  I did advise patient to start on calcium 1200 mg daily.  We will increase his calcium to 1800 mg daily and plan a recheck on Friday.      We discussed CBC showed platelet count was slightly lower at 22,000.  Hemoglobin is 8.2 with a hematocrit of 25.7.  We will plan for him to keep his scheduled appointment with infusion on Friday for recheck of platelets and hemoglobin.  We will plan to transfuse with 2 units of packed red blood cells if hemoglobin is less than 8.  We will also tentatively plan if platelets are less than 10 to transfuse a pack of platelet pheresis.    3.  Renal dysfunction secondary to myeloma.  Stable.    4.  History of multiple DVTs.  Patient on Coumadin    5.  CAD with depressed EF of 40%    Highly complex patient with discussion regarding findings of his last testing and treatment options going forward.This visit addresses a chronic illness that poses a threat to life on drug therapy requiring intensive monitoring for toxicity and warrants a level 5 MDM.      EDNA Lambert  [facility] Hematology and Oncology    Return in (Approximately): 1 week    Orders Placed This Encounter   Procedures   • Comprehensive Metabolic Panel   • CBC & Differential       2/24/2021

## 2021-03-05 NOTE — PROGRESS NOTES
PROBLEM LIST:  Oncology/Hematology History   Multiple myeloma not having achieved remission (CMS/HCC)   8/18/2020 Initial Diagnosis    Multiple myeloma not having achieved remission (CMS/HCC)     8/20/2020 Cancer Staged    Staging form: Plasma Cell Myeloma And Plasma Cell Disorders, AJCC 8th Edition  - Clinical stage from 8/20/2020: RISS Stage III (Beta-2-microglobulin (mg/L): 16, Albumin (g/dL): 2.7, ISS: Stage III, High-risk cytogenetics: Present, LDH: Normal) - Signed by Merry Pelletier MD on 8/24/2020 8/27/2020 - 1/7/2021 Chemotherapy    OP MULTIPLE MYELOMA Daratumumab / Bortezomib / Revlimid/Dexamethasone     1/8/2021 -  Chemotherapy    OP MULTIPLE MYELOMA Daratumumab / Lenalidomide / Dexamethasone     1/8/2021 -  Chemotherapy    OP SUPPORTIVE Denosumab (Xgeva) Q28D         REASON FOR VISIT: Newly diagnosed Myeloma    HISTORY OF PRESENT ILLNESS:   65 y.o.  male presents today for follow-up.  He underwent induction chemotherapy with Darzalek Velcade Revlimid and dexamethasone.  He completed 4 cycles with a very nice response.  He underwent a transplant evaluation.  Unfortunately due to suppressed ejection fraction of 40% he was deemed not a candidate for transplant.  Subsequently in December I started him back on treatment with daratumumab, lenalidomide and dexamethasone.  I have had to hold his lenalidomide due to his significant issues with thrombocytopenia and anemia.  He denies any infections.  Otherwise fatigue was a major issue.  That seems to have improved with his transfusion.        Past medical history, social history and family history was reviewed and unchanged from prior visit.    Review of Systems:    Review of Systems   Constitutional: Positive for fatigue.   HENT:  Negative.    Eyes: Negative.    Respiratory: Negative.    Cardiovascular: Negative.    Gastrointestinal: Negative.    Endocrine: Negative.    Genitourinary: Negative.     Musculoskeletal: Negative.    Skin: Negative.     Neurological: Negative.    Hematological: Negative.    Psychiatric/Behavioral: Negative.             Medications:        Current Outpatient Medications:   •  acyclovir (ZOVIRAX) 400 MG tablet, Take 1 tablet by mouth 2 (Two) Times a Day. Take no more than 5 doses a day., Disp: 60 tablet, Rfl: 11  •  aspirin 81 MG EC tablet, Take 81 mg by mouth Daily., Disp: , Rfl:   •  atorvastatin (LIPITOR) 80 MG tablet, TAKE 1 TABLET BY MOUTH  EVERY NIGHT, Disp: 90 tablet, Rfl: 3  •  dexamethasone (DECADRON) 4 MG tablet, Take 10 tabs on days 1,8,15,22. Take 1 tab on day 2,3,4,5,6,7,9,10,11,12,13,14,16,18,19,20,21,23,24,, Disp: 48 tablet, Rfl: 11  •  fluocinolone (SYNALAR) 0.01 % external solution, Apply  topically to the appropriate area as directed 2 (Two) Times a Day., Disp: 60 mL, Rfl: 11  •  gabapentin (NEURONTIN) 800 MG tablet, Take 800 mg by mouth 3 (Three) Times a Day., Disp: , Rfl:   •  glimepiride (AMARYL) 2 MG tablet, Take 1 tablet by mouth Daily., Disp: 90 tablet, Rfl: 1  •  glucose blood (OneTouch Ultra) test strip, 1 each by Other route 2 (Two) Times a Day. to check glucose     DX  E11.9, Disp: 100 each, Rfl: 11  •  HumuLIN R 100 UNIT/ML injection, USE ACCORDING TO SLIDING SCALE AT DISCHARGE, Disp: , Rfl:   •  Insulin Glargine (BASAGLAR KWIKPEN) 100 UNIT/ML injection pen, Inject 20 Units under the skin into the appropriate area as directed Daily., Disp: 3 pen, Rfl: 5  •  Insulin Pen Needle 31G X 6 MM misc, 1 stick Daily., Disp: 90 each, Rfl: 0  •  lenalidomide (REVLIMID) 25 MG capsule, Take 1 capsule by mouth Daily. Daily Days 1-21 then 7 days off. Adult male REMS [id number], Disp: 21 capsule, Rfl: 0  •  lisinopril (PRINIVIL,ZESTRIL) 10 MG tablet, Take 1 tablet by mouth Daily., Disp: 90 tablet, Rfl: 3  •  metFORMIN (GLUCOPHAGE) 1000 MG tablet, TAKE 1 TABLET BY MOUTH TWO  TIMES DAILY WITH MEALS, Disp: 180 tablet, Rfl: 3  •  methadone (DOLOPHINE) 10 MG tablet, Take 30 mg by mouth Every 6 (Six) Hours As Needed for Moderate  "Pain ,, Disp: , Rfl:   •  metoprolol tartrate (LOPRESSOR) 25 MG tablet, TAKE 1 TABLET BY MOUTH TWO  TIMES DAILY, Disp: 180 tablet, Rfl: 3  •  ondansetron (Zofran) 8 MG tablet, Take 1 tablet by mouth Every 8 (Eight) Hours As Needed for Nausea or Vomiting., Disp: 30 tablet, Rfl: 11  •  OneTouch Delica Lancets 33G misc, 1 application 3 (Three) Times a Day. Check blood sugar three times daily, E11.9, Disp: 100 each, Rfl: 11  •  pantoprazole (PROTONIX) 40 MG EC tablet, TAKE 1 TABLET BY MOUTH  DAILY, Disp: 90 tablet, Rfl: 3  •  sulfamethoxazole-trimethoprim (BACTRIM DS,SEPTRA DS) 800-160 MG per tablet, Take 1 tab 3 times a day on Monday Wednesday and Friday., Disp: 60 tablet, Rfl: 11  •  tiZANidine (ZANAFLEX) 4 MG tablet, Take 4 mg by mouth 3 (Three) Times a Day As Needed for Muscle Spasms., Disp: , Rfl:   •  warfarin (COUMADIN) 4 MG tablet, Take 1 tablet by mouth Daily. Resume 10/1/18. 4mg daily but Take 7.5 tablet usually on Friday one time dose (Patient taking differently: Take 4 mg by mouth Daily. Resume 10/1/18. 4mg daily), Disp: 90 tablet, Rfl: 1    Pain Medications             aspirin 81 MG EC tablet Take 81 mg by mouth Daily.    dexamethasone (DECADRON) 4 MG tablet Take 10 tabs on days 1,8,15,22. Take 1 tab on day 2,3,4,5,6,7,9,10,11,12,13,14,16,18,19,20,21,23,24,    gabapentin (NEURONTIN) 800 MG tablet Take 800 mg by mouth 3 (Three) Times a Day.    methadone (DOLOPHINE) 10 MG tablet Take 30 mg by mouth Every 6 (Six) Hours As Needed for Moderate Pain ,    tiZANidine (ZANAFLEX) 4 MG tablet Take 4 mg by mouth 3 (Three) Times a Day As Needed for Muscle Spasms.             ALLERGIES:  No Known Allergies      Physical Exam    VITAL SIGNS:  /83   Pulse 63   Temp 97.3 °F (36.3 °C) (Temporal)   Resp 16   Ht 175.3 cm (69\")   Wt 88 kg (194 lb)   SpO2 98%   BMI 28.65 kg/m²     Wt Readings from Last 3 Encounters:   03/05/21 88 kg (194 lb)   02/24/21 88.5 kg (195 lb)   02/19/21 86.6 kg (191 lb)       Body mass " index is 28.65 kg/m². Body surface area is 2.04 meters squared.    Pain Score    03/05/21 1009   PainSc: 0-No pain         Performance Status: 0    General: well appearing, in no acute distress  HEENT: sclera anicteric, neck is supple  Lymphatics: no cervical, supraclavicular, or axillary adenopathy  Cardiovascular: regular rate and rhythm, no murmurs, rubs or gallops  Lungs: clear to auscultation bilaterally  Abdomen: soft, nontender, nondistended.  No palpable organomegaly  Extremities: no lower extremity edema  Skin: no rashes, lesions, bruising, or petechiae  Msk:  Shows no weakness of the large muscle groups  Psych: Mood is stable    Lab Results   Component Value Date    FINALDX  08/13/2020      PERIPHERAL BLOOD SMEAR, BONE MARROW ASPIRATE, CLOT SECTION AND BIOPSY WITH FLOW CYTOMETRY, IMMUNOHISTOCHEMISTRY AND IN-SITU HYBRIDIZATION STUDIES:  Pancytopenia.  Involved by CD56 positive lambda clonal plasma cell neoplasm accounting for approximately 50% of marrow cellularity.  Mild reticulin fibrosis identified (MF-1).  No amyloid deposition identified on congo red stain with appropriate control.      PCC/dlb             RECENT LABS:    Lab Results   Component Value Date    HGB 7.7 (L) 02/26/2021    HCT 24.1 (L) 02/26/2021    MCV 98.4 (H) 02/26/2021    PLT 26 (C) 02/26/2021    WBC 3.11 (L) 02/26/2021    NEUTROABS 1.95 02/26/2021    LYMPHSABS 0.98 02/26/2021    MONOSABS 0.15 02/26/2021    EOSABS 0.01 02/26/2021    BASOSABS 0.01 02/26/2021       Lab Results   Component Value Date    GLUCOSE 110 (H) 02/24/2021    BUN 18 02/24/2021    CREATININE 1.34 (H) 02/24/2021     02/24/2021    K 4.7 02/24/2021     02/24/2021    CO2 27.1 02/24/2021    CALCIUM 7.5 (L) 02/24/2021    PROTEINTOT 6.0 02/24/2021    ALBUMIN 3.5 03/01/2021    BILITOT 0.3 02/24/2021    ALKPHOS 62 02/24/2021    AST 11 02/24/2021    ALT 13 02/24/2021     Lab Results   Component Value Date    MSPIKE 1.0 2/5/2021    MSPIKE 0.9 01/08/2021    MSPIKE  NOT QUANTIFIABLE 10/30/2020    MSPIKE 0.5 10/09/2020    MSPIKE 2.0 09/18/2020         MSPIKE         6.0              08/21/2020    Lab Results   Component Value Date    FREEKAPPAL 10.8 02/05/2021    FREEKAPPAL 6.8 01/08/2021    FREEKAPPAL 12.6 10/30/2020     Lab Results   Component Value Date    IGLFLC 267.3 (H) 02/05/2021    IGLFLC 241.4 (H) 01/08/2021    IGLFLC 13.1 10/30/2020         IGLFLC                                                         1173                                 08/21/2020        Assessment/Plan    1.  Multiple myeloma with renal dysfunction and pancytopenia.  I am going to continue Darzalek and dexamethasone today.  Having to hold his lenalidomide due to severe thrombocytopenia.  If his platelets have recovered today I am going to reduce his dose to 15 mg/day 2 weeks on and 1 week off.  I am concerned that he is not having as good a response to his treatment as he has in the past.  May have to switch his Revlimid to Velcade if he continues to do this.  May be is more susceptible to the Velcade than the imid.    3.  Renal dysfunction secondary to myeloma.  Stable.    4.  History of multiple DVTs.  Patient on Coumadin    5.  CAD with depressed EF of 40%    6.  Severe thrombocytopenia related to his chemotherapy.  This is preventing us from being very aggressive with this treatment.  I am having to hold Revlimid for now.    Highly complex patient with discussion regarding findings of his last testing and treatment options going forward.This visit addresses a chronic illness that poses a threat to life on drug therapy requiring intensive monitoring for toxicity and warrants a level 5 MDM.      Merry Pelletier MD  [facility]    Return on: 03/19/21  Return in (Approximately): 2 weeks    Orders Placed This Encounter   Procedures   • DORA, PE & Free LT Chains, Ser   • Comprehensive metabolic panel   • Comprehensive metabolic panel   • Magnesium   • Phosphorus   • CBC  and Differential   • CBC and Differential   • CBC and Differential       3/5/2021

## 2021-03-08 NOTE — PROGRESS NOTES
Subjective   Gene Bond is a 65 y.o. male    Chief Complaint     Diabetes mellitus.   Coronary artery disease.  Hypertension.  Multiple myeloma.     History of Present Illness  Gene Bond presents today for a follow-up evaluation of diabetes mellitus, coronary artery disease, hypertension, and multiple myeloma. The patient continues chemotherapy for his multiple myeloma. He is on chronic anticoagulant therapy. His hemoglobin A1C was 6.1% today. The patient reports blood problems in the last 2-3 weeks.  He continues on blood thinners and notes his platelets were low at 22 recently on lab work.     The following portions of the patient's history were reviewed and updated as appropriate: allergies, current medications, past social history and problem list    Review of Systems   Constitutional: Negative for appetite change, diaphoresis, fatigue and unexpected weight change.   Eyes: Negative for visual disturbance.   Respiratory: Negative for cough, chest tightness and shortness of breath.    Cardiovascular: Negative for chest pain, palpitations and leg swelling.   Gastrointestinal: Negative for abdominal pain, diarrhea, nausea and vomiting.   Endocrine: Negative for polydipsia, polyphagia and polyuria.   Genitourinary: Negative.    Musculoskeletal: Positive for arthralgias, gait problem and myalgias.   Skin: Negative for color change and rash.   Allergic/Immunologic: Positive for immunocompromised state.   Neurological: Negative for dizziness, syncope, weakness, light-headedness, numbness and headaches.   Psychiatric/Behavioral: Negative for dysphoric mood and sleep disturbance. The patient is not nervous/anxious.        Objective     Vitals:    03/08/21 1419   BP: 146/88   Pulse: 76   Resp: 16   SpO2: 100%       Physical Exam  Vitals and nursing note reviewed.   Constitutional:       Appearance: He is well-developed and normal weight. He is not diaphoretic.   HENT:      Head: Normocephalic and atraumatic.    Eyes:      General: No scleral icterus.     Conjunctiva/sclera: Conjunctivae normal.      Pupils: Pupils are equal, round, and reactive to light.   Neck:      Thyroid: No thyromegaly.      Vascular: No JVD.   Cardiovascular:      Rate and Rhythm: Normal rate and regular rhythm.      Pulses: Normal pulses.      Heart sounds: Normal heart sounds. No murmur.   Pulmonary:      Effort: Pulmonary effort is normal. No respiratory distress.      Breath sounds: Normal breath sounds.   Abdominal:      General: Bowel sounds are normal.      Palpations: Abdomen is soft.      Tenderness: There is no abdominal tenderness.   Musculoskeletal:      Cervical back: Neck supple.      Right lower leg: No edema.      Left lower leg: No edema.   Lymphadenopathy:      Cervical: No cervical adenopathy.   Skin:     General: Skin is warm and dry.   Neurological:      General: No focal deficit present.      Mental Status: He is alert and oriented to person, place, and time.      Sensory: No sensory deficit.   Psychiatric:         Mood and Affect: Mood normal.         Behavior: Behavior normal.         Assessment/Plan   Problems Addressed this Visit        Cardiac and Vasculature    HTN (hypertension)    Coronary artery disease involving native heart without angina pectoris       Coag and Thromboembolic    History of DVT (deep vein thrombosis)       Endocrine and Metabolic    DM (diabetes mellitus) (CMS/[facility]) - Primary    Relevant Orders    POC Glycosylated Hemoglobin (Hb A1C) (Completed)       Hematology and Neoplasia    Multiple myeloma not having achieved remission (CMS/[facility])       Pulmonary and Pneumonias    COPD (chronic obstructive pulmonary disease) (CMS/[facility])      Diagnoses       Codes Comments    Type 2 diabetes mellitus without complication, without long-term current use of insulin (CMS/[facility])    -  Primary ICD-10-CM: E11.9  ICD-9-CM: 250.00     Essential hypertension     ICD-10-CM: I10  ICD-9-CM: 401.9     History of DVT (deep vein  thrombosis)     ICD-10-CM: Z86.718  ICD-9-CM: V12.51     Multiple myeloma not having achieved remission (CMS/HCC)     ICD-10-CM: C90.00  ICD-9-CM: 203.00     Chronic obstructive pulmonary disease, unspecified COPD type (CMS/HCC)     ICD-10-CM: J44.9  ICD-9-CM: 496     Coronary artery disease involving native heart without angina pectoris, unspecified vessel or lesion type     ICD-10-CM: I25.10  ICD-9-CM: 414.01         Scribed for LINDA Bolden MD by MEAGHAN RODRÍGUEZ.  03/08/21   17:54 EST    I have personally performed the services described in this document as scribed by the above individual, and it is both accurate and complete.  LINDA Bloden MD  3/12/2021  12:57 EST

## 2021-03-10 NOTE — TELEPHONE ENCOUNTER
Returned call to patient's wife. At this patient's wife stating that the chemo that we are wanting to change patient to is toxic to patient's hard and stating why would we want to do this when it's so toxic Stating she had also called to speak to manager. Questioning behind the changes and affects it will have on the heart.  At this time Dr. Caballero decided to talk with wife. Going into detail of the effects his chemo's will have on counts and watching patient's heart issues.

## 2021-03-10 NOTE — PROGRESS NOTES
I reviewed his labs which were done at our visit couple of days ago.  He has clearly increasing M spike that went from 1-1.4.  He is currently on Darzalek Revlimid and dexamethasone.  Unfortunately the Revlimid is causing fair bit of thrombocytopenia which is causing dose reduction.  I am not sure placing him on Revlimid is going to make big difference since he is already starting to fail treatment.  Because of his underlying cardiac issues it is going to be a little difficult with some of the regimens involving anthracyclines.  I think a reasonable approach is to try Darzalex with carfilzomib since this is produce him inhibitor and may have some response.  May consider addition of low-dose Pomalyst to the regimen if he tolerates it.  He does have a fairly high risk cytogenetics which makes controlling his disease fairly difficult.  His EF when checked at  was around 40%.  I may have to recheck it to see where we are with it.  I am going to send him for second opinion at  to see if they would consider some other regimen in this setting.  There are multiple regimens and I would like to get him through some of them.  Obviously disease control is going to be paramount.    Lab Results   Component Value Date    MSPIKE 1.4 03/05/2021    MSPIKE 0.8 02/05/2021    MSPIKE 0.9 01/08/2021    MSPIKE NOT OBSERVED 10/30/2020    MSPIKE 2.0 09/18/2020    MSPIKE 6.0 8/21/2020     Lab Results   Component Value Date    FREEKAPPAL 5.9 03/05/2021    FREEKAPPAL 10.8 02/05/2021    FREEKAPPAL 6.8 01/08/2021     Lab Results   Component Value Date    IGLFLC 297.3 (H) 03/05/2021    IGLFLC 267.3 (H) 02/05/2021    IGLFLC 241.4 (H) 01/08/2021     Oncology/Hematology History   Multiple myeloma not having achieved remission (CMS/HCC)   8/18/2020 Initial Diagnosis    Multiple myeloma not having achieved remission (CMS/HCC)     8/20/2020 Cancer Staged    Staging form: Plasma Cell Myeloma And Plasma Cell Disorders, AJCC 8th Edition  - Clinical  stage from 8/20/2020: RISS Stage III (Beta-2-microglobulin (mg/L): 16, Albumin (g/dL): 2.7, ISS: Stage III, High-risk cytogenetics: Present, LDH: Normal) - Signed by Merry Pelletier MD on 8/24/2020 8/24/2020 Molecular Testing    Molecular testing   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 1q21 gain     8/27/2020 - 1/7/2021 Chemotherapy    OP MULTIPLE MYELOMA Daratumumab / Bortezomib / Revlimid/Dexamethasone     12/7/2020 Transplant    Evaluation performed at the [facility] after induction chemotherapy with DRVD.  Patient was deemed not a candidate for transplant due to compromised ejection fraction - 40%     1/8/2021 - 4/1/2021 Chemotherapy    OP MULTIPLE MYELOMA Daratumumab / Lenalidomide / Dexamethasone     1/8/2021 -  Chemotherapy    OP SUPPORTIVE Denosumab (Xgeva) Q28D     4/26/2021 -  Chemotherapy    OP MULTIPLE MYELOMA Daratumumab / Carfilzomib / Dexamethasone

## 2021-03-10 NOTE — TELEPHONE ENCOUNTER
Caller: LUIS DOSS    Relationship to patient: WIFE    Best call back number: [phone]    PT'S WIFE SPOKE WITH JERZY EARLIER IN REGARD TO DR ADLER CHANGING PT'S MEDICATION. SHE HAS MORE QUESTIONS AND WOULD LIKE DR ADLER OR JERZY TO CALL HER BACK AS SOON AS POSSIBLE. SHE STATES SHE NEEDS TO SPEAK TO ONE OF THEM TODAY.

## 2021-03-10 NOTE — TELEPHONE ENCOUNTER
Called patient and spoke to patient's wife. Informing her that Dr. Caballero wants to change patient's plan and that he is to take no more oral chemo. Went over names of new chemo and that he will start next month. Patient's wife stating she understood plan.

## 2021-03-19 NOTE — PROGRESS NOTES
PROBLEM LIST:  Oncology/Hematology History   Multiple myeloma not having achieved remission (CMS/HCC)   8/18/2020 Initial Diagnosis    Multiple myeloma not having achieved remission (CMS/HCC)     8/20/2020 Cancer Staged    Staging form: Plasma Cell Myeloma And Plasma Cell Disorders, AJCC 8th Edition  - Clinical stage from 8/20/2020: RISS Stage III (Beta-2-microglobulin (mg/L): 16, Albumin (g/dL): 2.7, ISS: Stage III, High-risk cytogenetics: Present, LDH: Normal) - Signed by Merry Pelletier MD on 8/24/2020 8/24/2020 Molecular Testing    Molecular testing   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 1q21 gain     8/27/2020 - 1/7/2021 Chemotherapy    OP MULTIPLE MYELOMA Daratumumab / Bortezomib / Revlimid/Dexamethasone     12/7/2020 Transplant    Evaluation performed at the [facility] after induction chemotherapy with DRVD.  Patient was deemed not a candidate for transplant due to compromised ejection fraction - 40%     1/8/2021 - 4/1/2021 Chemotherapy    OP MULTIPLE MYELOMA Daratumumab / Lenalidomide / Dexamethasone     1/8/2021 -  Chemotherapy    OP SUPPORTIVE Denosumab (Xgeva) Q28D     4/26/2021 -  Chemotherapy    OP MULTIPLE MYELOMA Daratumumab / Carfilzomib / Dexamethasone         REASON FOR VISIT: Newly diagnosed Myeloma    HISTORY OF PRESENT ILLNESS:   65 y.o.  male presents today for follow-up.  He underwent induction chemotherapy with Darzalek Velcade Revlimid and dexamethasone.  He completed 4 cycles with a very nice response.  He underwent a transplant evaluation.  Unfortunately due to suppressed ejection fraction of 40% he was deemed not a candidate for transplant.  Subsequently in December I started him back on treatment with daratumumab, lenalidomide and dexamethasone.  I have had to hold his lenalidomide due to his significant issues with thrombocytopenia and anemia.  He denies any infections.  Otherwise fatigue was a major issue.  That seems to have improved with his  transfusion.  He is having issues with thrombocytopenia related to his Revlimid.  Otherwise symptomatically doing well with no issues with infections.  He does have some bruising at times.  He has fatigue.        Past medical history, social history and family history was reviewed and unchanged from prior visit.    Review of Systems:    Review of Systems   Constitutional: Positive for fatigue.   HENT:  Negative.    Eyes: Negative.    Respiratory: Negative.    Cardiovascular: Negative.    Gastrointestinal: Negative.    Endocrine: Negative.    Genitourinary: Negative.     Musculoskeletal: Negative.    Skin: Negative.    Neurological: Negative.    Hematological: Negative.    Psychiatric/Behavioral: Negative.             Medications:        Current Outpatient Medications:   •  acyclovir (ZOVIRAX) 400 MG tablet, Take 1 tablet by mouth 2 (Two) Times a Day. Take no more than 5 doses a day., Disp: 60 tablet, Rfl: 11  •  aspirin 81 MG EC tablet, Take 81 mg by mouth Daily., Disp: , Rfl:   •  atorvastatin (LIPITOR) 80 MG tablet, TAKE 1 TABLET BY MOUTH  EVERY NIGHT, Disp: 90 tablet, Rfl: 3  •  dexamethasone (DECADRON) 4 MG tablet, Take 10 tabs on days 1,8,15,22. Take 1 tab on day 2,3,4,5,6,7,9,10,11,12,13,14,16,18,19,20,21,23,24,, Disp: 48 tablet, Rfl: 11  •  fluocinolone (SYNALAR) 0.01 % external solution, Apply  topically to the appropriate area as directed 2 (Two) Times a Day., Disp: 60 mL, Rfl: 11  •  gabapentin (NEURONTIN) 800 MG tablet, Take 800 mg by mouth 3 (Three) Times a Day., Disp: , Rfl:   •  glimepiride (AMARYL) 2 MG tablet, Take 1 tablet by mouth Daily., Disp: 90 tablet, Rfl: 1  •  glucose blood (OneTouch Ultra) test strip, 1 each by Other route 2 (Two) Times a Day. to check glucose     DX  E11.9, Disp: 100 each, Rfl: 11  •  HumuLIN R 100 UNIT/ML injection, USE ACCORDING TO SLIDING SCALE AT DISCHARGE, Disp: , Rfl:   •  Insulin Glargine (BASAGLAR KWIKPEN) 100 UNIT/ML injection pen, Inject 20 Units under the skin  into the appropriate area as directed Daily., Disp: 3 pen, Rfl: 5  •  Insulin Pen Needle 31G X 6 MM misc, 1 stick Daily., Disp: 90 each, Rfl: 0  •  lisinopril (PRINIVIL,ZESTRIL) 10 MG tablet, Take 1 tablet by mouth Daily., Disp: 90 tablet, Rfl: 3  •  metFORMIN (GLUCOPHAGE) 1000 MG tablet, TAKE 1 TABLET BY MOUTH TWO  TIMES DAILY WITH MEALS, Disp: 180 tablet, Rfl: 3  •  methadone (DOLOPHINE) 10 MG tablet, Take 30 mg by mouth Every 6 (Six) Hours As Needed for Moderate Pain ,, Disp: , Rfl:   •  metoprolol tartrate (LOPRESSOR) 25 MG tablet, TAKE 1 TABLET BY MOUTH TWO  TIMES DAILY, Disp: 180 tablet, Rfl: 3  •  ondansetron (Zofran) 8 MG tablet, Take 1 tablet by mouth Every 8 (Eight) Hours As Needed for Nausea or Vomiting., Disp: 30 tablet, Rfl: 11  •  OneTouch Delica Lancets 33G misc, 1 application 3 (Three) Times a Day. Check blood sugar three times daily, E11.9, Disp: 100 each, Rfl: 11  •  pantoprazole (PROTONIX) 40 MG EC tablet, TAKE 1 TABLET BY MOUTH  DAILY, Disp: 90 tablet, Rfl: 3  •  sulfamethoxazole-trimethoprim (BACTRIM DS,SEPTRA DS) 800-160 MG per tablet, Take 1 tab 3 times a day on Monday Wednesday and Friday., Disp: 60 tablet, Rfl: 11  •  tiZANidine (ZANAFLEX) 4 MG tablet, Take 4 mg by mouth 3 (Three) Times a Day As Needed for Muscle Spasms., Disp: , Rfl:   •  warfarin (COUMADIN) 4 MG tablet, Take 1 tablet by mouth Daily. Resume 10/1/18. 4mg daily but Take 7.5 tablet usually on Friday one time dose (Patient taking differently: Take 4 mg by mouth Daily. 4mg and 5mg alternating), Disp: 90 tablet, Rfl: 1    Pain Medications             aspirin 81 MG EC tablet Take 81 mg by mouth Daily.    dexamethasone (DECADRON) 4 MG tablet Take 10 tabs on days 1,8,15,22. Take 1 tab on day 2,3,4,5,6,7,9,10,11,12,13,14,16,18,19,20,21,23,24,    gabapentin (NEURONTIN) 800 MG tablet Take 800 mg by mouth 3 (Three) Times a Day.    methadone (DOLOPHINE) 10 MG tablet Take 30 mg by mouth Every 6 (Six) Hours As Needed for Moderate Pain ,     "tiZANidine (ZANAFLEX) 4 MG tablet Take 4 mg by mouth 3 (Three) Times a Day As Needed for Muscle Spasms.             ALLERGIES:  No Known Allergies      Physical Exam    VITAL SIGNS:  /64   Pulse 68   Temp 97.1 °F (36.2 °C) (Temporal)   Resp 16   Ht 175.3 cm (69\")   Wt 86.6 kg (191 lb)   SpO2 98%   BMI 28.21 kg/m²     Wt Readings from Last 3 Encounters:   03/19/21 86.6 kg (191 lb)   03/08/21 89.4 kg (197 lb)   03/05/21 88 kg (194 lb)       Body mass index is 28.21 kg/m². Body surface area is 2.03 meters squared.    Pain Score    03/19/21 0851   PainSc: 0-No pain         Performance Status: 0    General: well appearing, in no acute distress  HEENT: sclera anicteric, neck is supple  Lymphatics: no cervical, supraclavicular, or axillary adenopathy  Cardiovascular: regular rate and rhythm, no murmurs, rubs or gallops  Lungs: clear to auscultation bilaterally  Abdomen: soft, nontender, nondistended.  No palpable organomegaly  Extremities: no lower extremity edema  Skin: no rashes, lesions, bruising, or petechiae  Msk:  Shows no weakness of the large muscle groups  Psych: Mood is stable    Lab Results   Component Value Date    FINALDX  08/13/2020      PERIPHERAL BLOOD SMEAR, BONE MARROW ASPIRATE, CLOT SECTION AND BIOPSY WITH FLOW CYTOMETRY, IMMUNOHISTOCHEMISTRY AND IN-SITU HYBRIDIZATION STUDIES:  Pancytopenia.  Involved by CD56 positive lambda clonal plasma cell neoplasm accounting for approximately 50% of marrow cellularity.  Mild reticulin fibrosis identified (MF-1).  No amyloid deposition identified on congo red stain with appropriate control.      PCC/dlb             RECENT LABS:    Lab Results   Component Value Date    HGB 10.4 (L) 03/05/2021    HCT 32.1 (L) 03/05/2021    MCV 96.1 03/05/2021    PLT 50 (L) 03/05/2021    WBC 2.50 (L) 03/05/2021    NEUTROABS 1.00 (L) 03/05/2021    LYMPHSABS 1.32 03/05/2021    MONOSABS 0.13 03/05/2021    EOSABS 0.04 03/05/2021    BASOSABS 0.01 03/05/2021       Lab Results "   Component Value Date    GLUCOSE 198 (H) 03/05/2021    BUN 22 03/05/2021    CREATININE 1.27 03/05/2021     (L) 03/05/2021    K 5.0 03/05/2021     03/05/2021    CO2 25.0 03/05/2021    CALCIUM 8.9 03/05/2021    PROTEINTOT 7.3 03/05/2021    ALBUMIN 4.20 03/05/2021    ALBUMIN 3.6 03/05/2021    BILITOT 0.4 03/05/2021    ALKPHOS 62 03/05/2021    AST 14 03/05/2021    ALT 15 03/05/2021     Lab Results   Component Value Date    MSPIKE 1.4 3/05/2021    MSPIKE 1.0 2/5/2021    MSPIKE 0.9 01/08/2021    MSPIKE NOT QUANTIFIABLE 10/30/2020    MSPIKE 0.5 10/09/2020    MSPIKE 2.0 09/18/2020         MSPIKE         6.0              08/21/2020    Lab Results   Component Value Date    FREEKAPPAL 5.9 03/05/2021    FREEKAPPAL 10.8 02/05/2021    FREEKAPPAL 6.8 01/08/2021     Lab Results   Component Value Date    IGLFLC 297.3 (H) 03/05/2021    IGLFLC 267.3 (H) 02/05/2021    IGLFLC 241.4 (H) 01/08/2021         IGLFLC                                                         1173                                 08/21/2020        Assessment/Plan    1.  Multiple myeloma with renal dysfunction and pancytopenia.  He has clear progression of disease on his numbers.  And he is not tolerating Revlimid.  I am going to switch him to a Darzalek with carfilzomib and dexamethasone.  Had a long discussion with him and his wife regarding the rationale behind his treatment.  He does have some CAD with low EF.  However it appears that his EF might have improved recently.  He is right at the cutoff for being able to use carfilzomib.  We will see how he does going forward.    3.  Renal dysfunction secondary to myeloma.  Stable.    4.  History of multiple DVTs.  Patient on Coumadin    5.  CAD with depressed EF of 40%    6.  Severe thrombocytopenia related to his chemotherapy.  This was starting to improve since stopping Revlimid.      I spent 40 minutes on the overall plan and care of the patient including reviewing his labs and considering treatment  options and face-to-face discussions.  Highly complex patient with discussion regarding findings of his last testing and treatment options going forward.This visit addresses a chronic illness that poses a threat to life on drug therapy requiring intensive monitoring for toxicity and warrants a level 5 MDM.      Merry Pelletier MD  [facility]         No orders of the defined types were placed in this encounter.      3/19/2021

## 2021-03-30 NOTE — PROGRESS NOTES
Oncology Nutrition Screening    Patient Name:  Gene Bond  YOB: 1955  MRN: [id number]  Date:  03/30/21  Physician:  Prabhu    Type of Cancer Treatment:   Chemotherapy:  Type: Kyprolis/Darzalex Faspro  Treatment Schedule: weekly    Patient Active Problem List   Diagnosis   • Disorder of intervertebral disc of high cervical region with radiculopathy   • Neck pain   • S/P cervical spinal fusion   • Mixed hyperlipidemia   • HTN (hypertension)   • DM (diabetes mellitus) (CMS/[facility])   • COPD (chronic obstructive pulmonary disease) (CMS/HCC)   • History of DVT (deep vein thrombosis)   • CAMERON treated with BiPAP   • Anemia   • Acute postoperative pain   • Arthritis of ankle   • Crushing injury of right foot, ankle, and toe   • Venous stasis   • Coronary artery disease involving native heart without angina pectoris   • Post laminectomy syndrome   • Multiple myeloma not having achieved remission (CMS/[facility])   • Uncontrolled type 2 diabetes mellitus with hyperglycemia (CMS/[facility])       Current Outpatient Medications   Medication Sig Dispense Refill   • acyclovir (ZOVIRAX) 400 MG tablet Take 1 tablet by mouth 2 (Two) Times a Day. Take no more than 5 doses a day. 60 tablet 11   • aspirin 81 MG EC tablet Take 81 mg by mouth Daily.     • atorvastatin (LIPITOR) 80 MG tablet TAKE 1 TABLET BY MOUTH  EVERY NIGHT 90 tablet 3   • dexamethasone (DECADRON) 4 MG tablet Take 10 tabs on days 1,8,15,22. Take 1 tab on day 2,3,4,5,6,7,9,10,11,12,13,14,16,18,19,20,21,23,24, 48 tablet 11   • fluocinolone (SYNALAR) 0.01 % external solution Apply  topically to the appropriate area as directed 2 (Two) Times a Day. 60 mL 11   • furosemide (LASIX) 20 MG tablet Take day of chemo  and the day after your infusion 30 tablet 2   • gabapentin (NEURONTIN) 800 MG tablet Take 800 mg by mouth 3 (Three) Times a Day.     • glimepiride (AMARYL) 2 MG tablet Take 1 tablet by mouth Daily. 90 tablet 1   • glucose blood (OneTouch Ultra) test strip 1  "each by Other route 2 (Two) Times a Day. to check glucose     DX  E11.9 100 each 11   • HumuLIN R 100 UNIT/ML injection USE ACCORDING TO SLIDING SCALE AT DISCHARGE     • Insulin Glargine (BASAGLAR KWIKPEN) 100 UNIT/ML injection pen Inject 20 Units under the skin into the appropriate area as directed Daily. 3 pen 5   • Insulin Pen Needle 31G X 6 MM misc 1 stick Daily. 90 each 0   • lisinopril (PRINIVIL,ZESTRIL) 10 MG tablet Take 1 tablet by mouth Daily. 90 tablet 3   • metFORMIN (GLUCOPHAGE) 1000 MG tablet TAKE 1 TABLET BY MOUTH TWO  TIMES DAILY WITH MEALS 180 tablet 3   • methadone (DOLOPHINE) 10 MG tablet Take 30 mg by mouth Every 6 (Six) Hours As Needed for Moderate Pain ,     • metoprolol tartrate (LOPRESSOR) 25 MG tablet TAKE 1 TABLET BY MOUTH TWO  TIMES DAILY 180 tablet 3   • ondansetron (Zofran) 8 MG tablet Take 1 tablet by mouth Every 8 (Eight) Hours As Needed for Nausea or Vomiting. 30 tablet 11   • OneTouch Delica Lancets 33G misc 1 application 3 (Three) Times a Day. Check blood sugar three times daily, E11.9 100 each 11   • pantoprazole (PROTONIX) 40 MG EC tablet TAKE 1 TABLET BY MOUTH  DAILY 90 tablet 3   • sulfamethoxazole-trimethoprim (BACTRIM DS,SEPTRA DS) 800-160 MG per tablet Take 1 tab 3 times a day on Monday Wednesday and Friday. 60 tablet 11   • tiZANidine (ZANAFLEX) 4 MG tablet Take 4 mg by mouth 3 (Three) Times a Day As Needed for Muscle Spasms.     • warfarin (COUMADIN) 4 MG tablet Take 1 tablet by mouth Daily. Resume 10/1/18. 4mg daily but Take 7.5 tablet usually on Friday one time dose (Patient taking differently: Take 4 mg by mouth Daily. 4mg and 5mg alternating) 90 tablet 1     No current facility-administered medications for this visit.       Glycemic Risk:   IDDM    Weight:   Height: 69\"  Weight: 192 lbs.  Usual Body Weight: 230 lbs.   BMI: 28  Overweight  Weight has decreased 35 pounds over last 7 months    Oral Food Intake:  Regular Diet - No Restrictions  Compared to normal intake, " "current food intake is less than normal       Hydration Status:   How many 8 ounce glass of water of fluid do you drink per day?  unknown    Enteral Feeding:   N/A    Nutrition Symptoms:   Altered Apetite  Diarrhea    Activity:   Not my normal self, but able to be up and about with fairly normal activities     reports that he quit smoking about 16 years ago. His smoking use included cigarettes. He has a 30.00 pack-year smoking history. He has never used smokeless tobacco. He reports that he does not drink alcohol and does not use drugs.    Evaluation of Nutritional Risk:   Patient identified to be at nutritional risk and/or for nutritional consultation; will follow patient through course of treatment.   Weight Change  Nutrition Impact Symptoms    RD spoke with pt over the phone and reviewed Oncology Nutrition Screening, notes listed above. RD encouraged pt to request \"Healthy Eating during Chemotherapy\" recipe book by [facility] at next infusion. RD mailed handouts including \"Managing Your Cancer Treatment Side Effects\" from [facility], \"Nutrition During and After Cancer Treatment\" \"Oncology: Nutrition Tips for Well Being\", \"Oncology: Cooking Tips\", \"Nutrition Therapy for Nausea and Vomiting\" from [employer], and \"Taste or Smell Changes\" from [employer] [facility] [employer]. RD provided contact information and encouraged pt to reach out with any questions or concerns before a follow-up call in about three weeks. RD available PRN.    Electronically signed by:  Criselda Fong RD  11:38 EDT  "

## 2021-04-16 NOTE — PROGRESS NOTES
PROBLEM LIST:  Oncology/Hematology History   Multiple myeloma not having achieved remission (CMS/HCC)   8/18/2020 Initial Diagnosis    Multiple myeloma not having achieved remission (CMS/HCC)     8/20/2020 Cancer Staged    Staging form: Plasma Cell Myeloma And Plasma Cell Disorders, AJCC 8th Edition  - Clinical stage from 8/20/2020: RISS Stage III (Beta-2-microglobulin (mg/L): 16, Albumin (g/dL): 2.7, ISS: Stage III, High-risk cytogenetics: Present, LDH: Normal) - Signed by Merry Pelletier MD on 8/24/2020 8/24/2020 Molecular Testing    Molecular testing   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 1q21 gain     8/27/2020 - 1/7/2021 Chemotherapy    OP MULTIPLE MYELOMA Daratumumab / Bortezomib / Revlimid/Dexamethasone     12/7/2020 Transplant    Evaluation performed at the [facility] after induction chemotherapy with DRVD.  Patient was deemed not a candidate for transplant due to compromised ejection fraction - 40%     1/8/2021 - 4/1/2021 Chemotherapy    OP MULTIPLE MYELOMA Daratumumab / Lenalidomide / Dexamethasone     1/8/2021 -  Chemotherapy    OP SUPPORTIVE Denosumab (Xgeva) Q28D     3/19/2021 -  Chemotherapy    OP MULTIPLE MYELOMA Daratumumab / Carfilzomib / Dexamethasone         REASON FOR VISIT:  Myeloma    HISTORY OF PRESENT ILLNESS:   65 y.o.  male presents today for follow-up.  He was started on Darzalex and carfilzomib with dexamethasone.  He tolerated treatment well.  He feels like he is feeling much better.  His wife reports he has had more energy and able to do more things that he enjoys.  Denies fevers.  Denies headaches.  He does have some easy bruising.        Past medical history, social history and family history was reviewed and unchanged from prior visit.    Review of Systems:    Review of Systems   Constitutional: Positive for fatigue.   HENT:  Negative.    Eyes: Negative.    Respiratory: Negative.    Cardiovascular: Negative.    Gastrointestinal: Negative.     Endocrine: Negative.    Genitourinary: Negative.     Musculoskeletal: Negative.    Skin: Negative.    Neurological: Negative.    Hematological: Negative.    Psychiatric/Behavioral: Negative.             Medications:        Current Outpatient Medications:   •  acyclovir (ZOVIRAX) 400 MG tablet, Take 1 tablet by mouth 2 (Two) Times a Day. Take no more than 5 doses a day., Disp: 60 tablet, Rfl: 11  •  aspirin 81 MG EC tablet, Take 81 mg by mouth Daily., Disp: , Rfl:   •  atorvastatin (LIPITOR) 80 MG tablet, TAKE 1 TABLET BY MOUTH IN  THE EVENING, Disp: 90 tablet, Rfl: 3  •  dexamethasone (DECADRON) 4 MG tablet, Take 10 tabs on days 1,8,15,22. Take 1 tab on day 2,3,4,5,6,7,9,10,11,12,13,14,16,18,19,20,21,23,24,, Disp: 48 tablet, Rfl: 11  •  fluocinolone (SYNALAR) 0.01 % external solution, Apply  topically to the appropriate area as directed 2 (Two) Times a Day., Disp: 60 mL, Rfl: 11  •  furosemide (LASIX) 20 MG tablet, Take day of chemo  and the day after your infusion, Disp: 30 tablet, Rfl: 2  •  gabapentin (NEURONTIN) 800 MG tablet, Take 800 mg by mouth 3 (Three) Times a Day., Disp: , Rfl:   •  glimepiride (AMARYL) 2 MG tablet, TAKE 1 TABLET BY MOUTH  DAILY, Disp: 90 tablet, Rfl: 3  •  glucose blood (OneTouch Ultra) test strip, 1 each by Other route 2 (Two) Times a Day. to check glucose     DX  E11.9, Disp: 100 each, Rfl: 11  •  HumuLIN R 100 UNIT/ML injection, USE ACCORDING TO SLIDING SCALE AT DISCHARGE, Disp: , Rfl:   •  Insulin Glargine (BASAGLAR KWIKPEN) 100 UNIT/ML injection pen, Inject 20 Units under the skin into the appropriate area as directed Daily., Disp: 3 pen, Rfl: 5  •  Insulin Pen Needle 31G X 6 MM misc, 1 stick Daily., Disp: 90 each, Rfl: 0  •  lisinopril (PRINIVIL,ZESTRIL) 10 MG tablet, Take 1 tablet by mouth Daily., Disp: 90 tablet, Rfl: 3  •  metFORMIN (GLUCOPHAGE) 1000 MG tablet, TAKE 1 TABLET BY MOUTH  TWICE DAILY WITH MEALS, Disp: 180 tablet, Rfl: 3  •  methadone (DOLOPHINE) 10 MG tablet, Take  "30 mg by mouth Every 6 (Six) Hours As Needed for Moderate Pain ,, Disp: , Rfl:   •  metoprolol tartrate (LOPRESSOR) 25 MG tablet, TAKE 1 TABLET BY MOUTH  TWICE DAILY, Disp: 180 tablet, Rfl: 3  •  ondansetron (Zofran) 8 MG tablet, Take 1 tablet by mouth Every 8 (Eight) Hours As Needed for Nausea or Vomiting., Disp: 30 tablet, Rfl: 11  •  OneTouch Delica Lancets 33G misc, 1 application 3 (Three) Times a Day. Check blood sugar three times daily, E11.9, Disp: 100 each, Rfl: 11  •  pantoprazole (PROTONIX) 40 MG EC tablet, TAKE 1 TABLET BY MOUTH  DAILY, Disp: 90 tablet, Rfl: 3  •  sulfamethoxazole-trimethoprim (BACTRIM DS,SEPTRA DS) 800-160 MG per tablet, Take 1 tab 3 times a day on Monday Wednesday and Friday., Disp: 60 tablet, Rfl: 11  •  tiZANidine (ZANAFLEX) 4 MG tablet, Take 4 mg by mouth 3 (Three) Times a Day As Needed for Muscle Spasms., Disp: , Rfl:   •  warfarin (COUMADIN) 4 MG tablet, Take 1 tablet by mouth Daily. Resume 10/1/18. 4mg daily but Take 7.5 tablet usually on Friday one time dose (Patient taking differently: Take 4 mg by mouth Daily. 4mg and 5mg alternating), Disp: 90 tablet, Rfl: 1    Pain Medications             aspirin 81 MG EC tablet Take 81 mg by mouth Daily.    dexamethasone (DECADRON) 4 MG tablet Take 10 tabs on days 1,8,15,22. Take 1 tab on day 2,3,4,5,6,7,9,10,11,12,13,14,16,18,19,20,21,23,24,    gabapentin (NEURONTIN) 800 MG tablet Take 800 mg by mouth 3 (Three) Times a Day.    methadone (DOLOPHINE) 10 MG tablet Take 30 mg by mouth Every 6 (Six) Hours As Needed for Moderate Pain ,    tiZANidine (ZANAFLEX) 4 MG tablet Take 4 mg by mouth 3 (Three) Times a Day As Needed for Muscle Spasms.             ALLERGIES:  No Known Allergies      Physical Exam    VITAL SIGNS:  /60   Pulse 66   Temp 97.3 °F (36.3 °C) (Temporal)   Resp 16   Ht 175.3 cm (69\")   Wt 87.1 kg (192 lb)   SpO2 98%   BMI 28.35 kg/m²     Wt Readings from Last 3 Encounters:   04/16/21 87.1 kg (192 lb)   04/02/21 88.4 kg " (194 lb 14.4 oz)   03/26/21 87.5 kg (192 lb 12.8 oz)       Body mass index is 28.35 kg/m². Body surface area is 2.03 meters squared.    Pain Score    04/16/21 0844   PainSc: 0-No pain         Performance Status: 0    General: well appearing, in no acute distress  HEENT: sclera anicteric, neck is supple  Lymphatics: no cervical, supraclavicular, or axillary adenopathy  Cardiovascular: regular rate and rhythm, no murmurs, rubs or gallops  Lungs: clear to auscultation bilaterally  Abdomen: soft, nontender, nondistended.  No palpable organomegaly  Extremities: no lower extremity edema  Skin: no rashes, lesions, bruising, or petechiae  Msk:  Shows no weakness of the large muscle groups  Psych: Mood is stable    Lab Results   Component Value Date    FINALDX  08/13/2020      PERIPHERAL BLOOD SMEAR, BONE MARROW ASPIRATE, CLOT SECTION AND BIOPSY WITH FLOW CYTOMETRY, IMMUNOHISTOCHEMISTRY AND IN-SITU HYBRIDIZATION STUDIES:  Pancytopenia.  Involved by CD56 positive lambda clonal plasma cell neoplasm accounting for approximately 50% of marrow cellularity.  Mild reticulin fibrosis identified (MF-1).  No amyloid deposition identified on congo red stain with appropriate control.      PCC/dlb             RECENT LABS:    Lab Results   Component Value Date    HGB 9.1 (L) 04/02/2021    HCT 27.7 (L) 04/02/2021    MCV 95.5 04/02/2021    PLT 65 (L) 04/02/2021    WBC 2.92 (L) 04/02/2021    NEUTROABS 1.61 (L) 04/02/2021    LYMPHSABS 1.06 04/02/2021    MONOSABS 0.23 04/02/2021    EOSABS 0.00 04/02/2021    BASOSABS 0.01 04/02/2021       Lab Results   Component Value Date    GLUCOSE 186 (H) 04/02/2021    BUN 30 (H) 04/02/2021    CREATININE 1.50 (H) 04/02/2021     (L) 04/02/2021    K 5.0 04/02/2021    CL 95 (L) 04/02/2021    CO2 27.3 04/02/2021    CALCIUM 9.4 04/02/2021    PROTEINTOT 6.7 04/02/2021    ALBUMIN 4.10 04/02/2021    BILITOT 0.4 04/02/2021    ALKPHOS 56 04/02/2021    AST 14 04/02/2021    ALT 18 04/02/2021     Lab Results    Component Value Date    MSPIKE 1.4 3/05/2021    MSPIKE 1.0 2/5/2021    MSPIKE 0.9 01/08/2021    MSPIKE NOT QUANTIFIABLE 10/30/2020    MSPIKE 0.5 10/09/2020    MSPIKE 2.0 09/18/2020         MSPIKE         6.0              08/21/2020    Lab Results   Component Value Date    FREEKAPPAL 5.5 03/19/2021    FREEKAPPAL 5.9 03/05/2021    FREEKAPPAL 10.8 02/05/2021     Lab Results   Component Value Date    IGLFLC 380.2 (H) 03/19/2021    IGLFLC 297.3 (H) 03/05/2021    IGLFLC 267.3 (H) 02/05/2021         IGLFLC                                                         1173                                 08/21/2020      Assessment/Plan    1.  Multiple myeloma with renal dysfunction and pancytopenia.  We will continue with cycle #2 Darzalek with carfilzomib and dexamethasone.  I discussed with the patient and his wife that his labs were stable.  We discussed that this is a normal finding since he is only had 1 cycle of Darzalex and carfilzomib with dexamethasone.  We will continue to monitor his M spike with each cycle.  We will continue with Xgeva.  His next dose is due 4/30/2021.    2. CAD with low EF.  He saw Dr. Godinez last week.  He will continue to follow-up every 6 months.  EF might have improved recently.  He is right at the cutoff for being able to use carfilzomib.  We will see how he does going forward.    3.  Renal dysfunction secondary to myeloma.  Stable.    4.  History of multiple DVTs.  Patient on Coumadin    5.  Severe thrombocytopenia related to his chemotherapy.  This was starting to improve since stopping Revlimid.      I spent 40 minutes on the overall plan and care of the patient including reviewing his labs and considering treatment options and face-to-face discussions.  Highly complex patient with discussion regarding findings of his last testing and treatment options going forward.This visit addresses a chronic illness that poses a threat to life on drug therapy requiring intensive monitoring for  toxicity and warrants a level 5 MDM.      EDNA Lambert  [facility] Hematology and Oncology    Return in (Approximately): 1 month    Orders Placed This Encounter   Procedures   • B-type natriuretic peptide   • DORA, PE & Free LT Chains, Ser   • Comprehensive metabolic panel   • CBC and Differential   • CBC and Differential   • CBC and Differential       4/16/2021

## 2021-04-20 NOTE — PROGRESS NOTES
Nutrition Services    Patient Name:  Gene Bond  YOB: 1955  MRN: [id number]  Admit Date:  (Not on file)    RD spoke to pt over the phone regarding nutrition for oncology. Pt reports a poor appetite but his weight has been stable. He does not like nutritional supplements like Boost or Ensure. RD and pt discussed ways to use them in dessert recipes to increase calories and protein. He notes his diarrhea has resolved. He states his blood sugars have not been stable especially while he sleeps. RD educated pt on the importance of a HS snack. RD and pt went over examples of good snacks to include a carbohydrate choice and a protein choice. RD to mail list of HS snacks. Pt is agreeable. RD encouraged pt to follow a high-calorie, high-protein diet with plenty of fluids. Pt with RD contact information. RD encouraged pt to reach out with any questions or concerns. RD available PRN.     Electronically signed by:  Criselda Fong RD  04/20/21 09:21 EDT

## 2021-04-22 NOTE — TELEPHONE ENCOUNTER
Caller: Gene Bond    Relationship: Self    Best call back number: [phone]     Medication needed:   Requested Prescriptions     Pending Prescriptions Disp Refills   • warfarin (COUMADIN) 4 MG tablet 90 tablet 1     Sig: Take 1 tablet by mouth Daily. Resume 10/1/18. 4mg daily but Take 7.5 tablet usually on Friday one time dose   • glimepiride (AMARYL) 2 MG tablet 90 tablet 3     Sig: Take 1 tablet by mouth Daily.       When do you need the refill by: 30 DAYS    What additional details did the patient provide when requesting the medication:PATIENT STATED THAT THE WARFARIN IS 5 MG    Does the patient have less than a 3 day supply:  [] Yes  [x] No    What is the patient's preferred pharmacy: [facility] - [address] [phone] [facility] [phone]

## 2021-04-27 NOTE — TELEPHONE ENCOUNTER
PATIENT CALLED AND SAID HE TAKES WARFARIN 5 MG; HE HAS ENOUGH TO LAST FOR 2 WEEKS    [facility]    [place]: [phone]

## 2021-04-29 NOTE — TELEPHONE ENCOUNTER
Pharmacy Name: [facility] - [address] [phone] [facility] [phone]      Pharmacy representative name: YARY    Pharmacy representative phone number: [phone]  REFERENCE: [id number]  What medication are you calling in regards to:  warfarin (COUMADIN) 5 MG tablet       What question does the pharmacy have: PHARMACY NEEDS CLARIFICATION ON THE DIRECTIONS OF THE MEDICATION.    Who is the provider that prescribed the medication: DR GARCIA    Additional notes:

## 2021-05-21 NOTE — PROGRESS NOTES
PROBLEM LIST:  Oncology/Hematology History   Multiple myeloma not having achieved remission (CMS/HCC)   8/18/2020 Initial Diagnosis    Multiple myeloma not having achieved remission (CMS/HCC)     8/20/2020 Cancer Staged    Staging form: Plasma Cell Myeloma And Plasma Cell Disorders, AJCC 8th Edition  - Clinical stage from 8/20/2020: RISS Stage III (Beta-2-microglobulin (mg/L): 16, Albumin (g/dL): 2.7, ISS: Stage III, High-risk cytogenetics: Present, LDH: Normal) - Signed by Merry Pelletier MD on 8/24/2020 8/24/2020 Molecular Testing    Molecular testing   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 1q21 gain     8/27/2020 - 1/7/2021 Chemotherapy    OP MULTIPLE MYELOMA Daratumumab / Bortezomib / Revlimid/Dexamethasone     12/7/2020 Transplant    Evaluation performed at the [facility] after induction chemotherapy with DRVD.  Patient was deemed not a candidate for transplant due to compromised ejection fraction - 40%     1/8/2021 - 4/1/2021 Chemotherapy    OP MULTIPLE MYELOMA Daratumumab / Lenalidomide / Dexamethasone     1/8/2021 -  Chemotherapy    OP SUPPORTIVE Denosumab (Xgeva) Q28D     3/19/2021 -  Chemotherapy    OP MULTIPLE MYELOMA Daratumumab / Carfilzomib / Dexamethasone         REASON FOR VISIT: Management of my myeloma    HISTORY OF PRESENT ILLNESS:   65 y.o.  male presents today for follow-up.  He underwent induction chemotherapy with Darzalek Velcade Revlimid and dexamethasone.  He completed 4 cycles with a very nice response.  He underwent a transplant evaluation.  Unfortunately due to suppressed ejection fraction of 40% he was deemed not a candidate for transplant.  He now has progressive disease.  He is currently on daratumumab, carfilzomib and dexamethasone since March 2021.  He is tolerating this reasonably well.  He does have some fatigue issues that comes and goes.  Denies any recent infections.  No issues with nausea vomiting no diarrhea.  No neuropathy.    Past  medical history, social history and family history was reviewed and unchanged from prior visit.    Review of Systems:    Review of Systems   Constitutional: Positive for fatigue.   HENT:  Negative.    Eyes: Negative.    Respiratory: Negative.    Cardiovascular: Negative.    Gastrointestinal: Negative.    Endocrine: Negative.    Genitourinary: Negative.     Musculoskeletal: Negative.    Skin: Negative.    Neurological: Negative.    Hematological: Negative.    Psychiatric/Behavioral: Negative.             Medications:        Current Outpatient Medications:   •  acyclovir (ZOVIRAX) 400 MG tablet, Take 1 tablet by mouth 2 (Two) Times a Day. Take no more than 5 doses a day., Disp: 60 tablet, Rfl: 11  •  aspirin 81 MG EC tablet, Take 81 mg by mouth Daily., Disp: , Rfl:   •  atorvastatin (LIPITOR) 80 MG tablet, TAKE 1 TABLET BY MOUTH IN  THE EVENING, Disp: 90 tablet, Rfl: 3  •  dexamethasone (DECADRON) 4 MG tablet, Take 10 tabs on days 1,8,15,22. Take 1 tab on day 2,3,4,5,6,7,9,10,11,12,13,14,16,18,19,20,21,23,24,, Disp: 48 tablet, Rfl: 11  •  fluocinolone (SYNALAR) 0.01 % external solution, Apply  topically to the appropriate area as directed 2 (Two) Times a Day., Disp: 60 mL, Rfl: 11  •  furosemide (LASIX) 20 MG tablet, TAKE 1 TAB DAY OF CHEMO AND DAY AFTER INFUSION, Disp: 30 tablet, Rfl: 2  •  gabapentin (NEURONTIN) 800 MG tablet, Take 800 mg by mouth 3 (Three) Times a Day., Disp: , Rfl:   •  glimepiride (AMARYL) 2 MG tablet, Take 1 tablet by mouth Daily., Disp: 90 tablet, Rfl: 3  •  glucose blood (OneTouch Ultra) test strip, 1 each by Other route 2 (Two) Times a Day. to check glucose     DX  E11.9, Disp: 100 each, Rfl: 11  •  HumuLIN R 100 UNIT/ML injection, USE ACCORDING TO SLIDING SCALE AT DISCHARGE, Disp: , Rfl:   •  Insulin Glargine (BASAGLAR KWIKPEN) 100 UNIT/ML injection pen, Inject 20 Units under the skin into the appropriate area as directed Daily., Disp: 3 pen, Rfl: 5  •  Insulin Pen Needle 31G X 6 MM misc, 1  stick Daily., Disp: 90 each, Rfl: 0  •  lisinopril (PRINIVIL,ZESTRIL) 10 MG tablet, Take 1 tablet by mouth Daily., Disp: 90 tablet, Rfl: 3  •  metFORMIN (GLUCOPHAGE) 1000 MG tablet, TAKE 1 TABLET BY MOUTH  TWICE DAILY WITH MEALS, Disp: 180 tablet, Rfl: 3  •  methadone (DOLOPHINE) 10 MG tablet, Take 30 mg by mouth Every 6 (Six) Hours As Needed for Moderate Pain ,, Disp: , Rfl:   •  metoprolol tartrate (LOPRESSOR) 25 MG tablet, TAKE 1 TABLET BY MOUTH  TWICE DAILY, Disp: 180 tablet, Rfl: 3  •  ondansetron (Zofran) 8 MG tablet, Take 1 tablet by mouth Every 8 (Eight) Hours As Needed for Nausea or Vomiting., Disp: 30 tablet, Rfl: 11  •  OneTouch Delica Lancets 33G misc, 1 application 3 (Three) Times a Day. Check blood sugar three times daily, E11.9, Disp: 100 each, Rfl: 11  •  pantoprazole (PROTONIX) 40 MG EC tablet, TAKE 1 TABLET BY MOUTH  DAILY, Disp: 90 tablet, Rfl: 3  •  sulfamethoxazole-trimethoprim (BACTRIM DS,SEPTRA DS) 800-160 MG per tablet, Take 1 tab 3 times a day on Monday Wednesday and Friday., Disp: 60 tablet, Rfl: 11  •  tiZANidine (ZANAFLEX) 4 MG tablet, Take 4 mg by mouth 3 (Three) Times a Day As Needed for Muscle Spasms., Disp: , Rfl:   •  warfarin (COUMADIN) 5 MG tablet, Take 5 mg by mouth Daily. One pill daily, Disp: , Rfl:   No current facility-administered medications for this visit.    Facility-Administered Medications Ordered in Other Visits:   •  carfilzomib (KYPROLIS) 144 mg in dextrose (D5W) 5 % 50 mL chemo IVPB, 70 mg/m2 (Treatment Plan Recorded), Intravenous, Once, Marina Jimenez APRN  •  dextrose (D5W) 5 % infusion 250 mL, 250 mL, Intravenous, Once, Marina Jimenez APRUSHA    Pain Medications             aspirin 81 MG EC tablet Take 81 mg by mouth Daily.    dexamethasone (DECADRON) 4 MG tablet Take 10 tabs on days 1,8,15,22. Take 1 tab on day 2,3,4,5,6,7,9,10,11,12,13,14,16,18,19,20,21,23,24,    gabapentin (NEURONTIN) 800 MG tablet Take 800 mg by mouth 3 (Three) Times a Day.    methadone  "(DOLOPHINE) 10 MG tablet Take 30 mg by mouth Every 6 (Six) Hours As Needed for Moderate Pain ,    tiZANidine (ZANAFLEX) 4 MG tablet Take 4 mg by mouth 3 (Three) Times a Day As Needed for Muscle Spasms.             ALLERGIES:  No Known Allergies      Physical Exam    VITAL SIGNS:  /59   Pulse 77   Temp 97.5 °F (36.4 °C) (Temporal)   Resp 16   Ht 175.3 cm (69\")   Wt 89.4 kg (197 lb)   SpO2 98%   BMI 29.09 kg/m²     Wt Readings from Last 3 Encounters:   05/21/21 89.4 kg (197 lb)   05/14/21 89.8 kg (198 lb)   04/30/21 88.8 kg (195 lb 11.2 oz)       Body mass index is 29.09 kg/m². Body surface area is 2.05 meters squared.    Pain Score    05/21/21 0923   PainSc: 0-No pain         Performance Status: 0    General: well appearing, in no acute distress  HEENT: sclera anicteric, neck is supple  Lymphatics: no cervical, supraclavicular, or axillary adenopathy  Cardiovascular: regular rate and rhythm, no murmurs, rubs or gallops  Lungs: clear to auscultation bilaterally  Abdomen: soft, nontender, nondistended.  No palpable organomegaly  Extremities: no lower extremity edema  Skin: no rashes, lesions, bruising, or petechiae  Msk:  Shows no weakness of the large muscle groups  Psych: Mood is stable    Lab Results   Component Value Date    FINALDX  08/13/2020      PERIPHERAL BLOOD SMEAR, BONE MARROW ASPIRATE, CLOT SECTION AND BIOPSY WITH FLOW CYTOMETRY, IMMUNOHISTOCHEMISTRY AND IN-SITU HYBRIDIZATION STUDIES:  Pancytopenia.  Involved by CD56 positive lambda clonal plasma cell neoplasm accounting for approximately 50% of marrow cellularity.  Mild reticulin fibrosis identified (MF-1).  No amyloid deposition identified on congo red stain with appropriate control.      PCC/dlb             RECENT LABS:    Lab Results   Component Value Date    HGB 8.1 (L) 05/21/2021    HCT 24.9 (L) 05/21/2021    .1 (H) 05/21/2021    PLT 94 (L) 05/21/2021    WBC 5.57 05/21/2021    NEUTROABS 3.47 05/21/2021    LYMPHSABS 1.54 05/21/2021 "    MONOSABS 0.46 05/21/2021    EOSABS 0.07 05/21/2021    BASOSABS 0.01 05/21/2021       Lab Results   Component Value Date    GLUCOSE 178 (H) 05/21/2021    BUN 40 (H) 05/21/2021    CREATININE 1.81 (H) 05/21/2021     (L) 05/21/2021    K 5.4 (H) 05/21/2021    CL 98 05/21/2021    CO2 22.6 05/21/2021    CALCIUM 9.1 05/21/2021    PROTEINTOT 6.9 05/21/2021    ALBUMIN 4.20 05/21/2021    BILITOT 0.6 05/21/2021    ALKPHOS 66 05/21/2021    AST 15 05/21/2021    ALT 13 05/21/2021     Lab Results   Component Value Date    MSPIKE 1.4 3/05/2021    MSPIKE 1.0 2/5/2021    MSPIKE 0.9 01/08/2021    MSPIKE NOT QUANTIFIABLE 10/30/2020    MSPIKE 0.5 10/09/2020    MSPIKE 2.0 09/18/2020         MSPIKE         6.0              08/21/2020    Lab Results   Component Value Date    FREEKAPPAL 5.1 05/14/2021    FREEKAPPAL 3.9 04/16/2021    FREEKAPPAL 5.5 03/19/2021     Lab Results   Component Value Date    IGLFLC 212.5 (H) 05/14/2021    IGLFLC 142.0 (H) 04/16/2021    IGLFLC 380.2 (H) 03/19/2021         IGLFLC                                                         1173                                 08/21/2020        Assessment/Plan    1.  Multiple myeloma with renal dysfunction and pancytopenia.  Continue Darzalex, carfilzomib and dexamethasone with no dose changes.  I had a raffaele discussion with him and his wife regarding his diagnosis and prognosis going forward.  He has very high risk disease and appears to be fairly resistant.  His options are going to be fairly limited going forward.    3.  Renal dysfunction secondary to myeloma.  Stable.    4.  History of multiple DVTs.  Patient on Coumadin    5.  CAD with depressed EF of 40%    6.  Severe thrombocytopenia related to his chemotherapy.  This does not seem to be a major issue at this time.  Still he is fairly thrombocytopenic.      Total time of patient care on day of service including time prior to, face to face with patient, and following visit spent in reviewing records, lab  results, imaging studies, discussion with patient, and documentation/charting was > 40 minutes.        Merry Pelletier MD  [facility] Hematology and Oncology    Return on: 07/16/21  Return in (Approximately): 6 weeks, Schedule with next infusion    Orders Placed This Encounter   Procedures   • B-type natriuretic peptide   • DORA, PE & Free LT Chains, Ser   • Comprehensive metabolic panel   • CBC and Differential   • CBC and Differential   • CBC and Differential       5/21/2021

## 2021-06-15 NOTE — PROGRESS NOTES
Subjective   Gene Bond is a 65 y.o. male    Chief Complaint    Chief Complaint  Recurrent DVT.  Chronic anticoagulation therapy.  Hypertension.  Type 2 diabetes mellitus.  Multiple myeloma.      History of Present Illness  The patient presents today for a recheck related to the above-noted problems. He has been doing well. He is accompanied today by his wife and his dog. He will be needing a refill on his warfarin tablets. He does home monitoring as far as his PT/INR is concerned.       The following portions of the patient's history were reviewed and updated as appropriate: allergies, current medications, past social history and problem list    Review of Systems   Constitutional: Negative for appetite change, chills, diaphoresis, fatigue, fever and unexpected weight change.   Eyes: Negative for visual disturbance.   Respiratory: Negative for cough, chest tightness, shortness of breath and wheezing.    Cardiovascular: Negative for chest pain, palpitations and leg swelling.   Gastrointestinal: Negative for abdominal pain, diarrhea, nausea and vomiting.   Endocrine: Negative for polydipsia, polyphagia and polyuria.   Genitourinary: Negative for dysuria, frequency and urgency.   Musculoskeletal: Negative for arthralgias, back pain and myalgias.   Skin: Negative for color change and rash.   Neurological: Negative for dizziness, syncope, weakness, light-headedness, numbness and headaches.   Hematological: Negative for adenopathy. Does not bruise/bleed easily.       Objective     Vitals:    06/15/21 1426   BP: 132/88   Pulse: 84   Resp: 14   Temp: 96.8 °F (36 °C)   SpO2: 99%       Physical Exam  Vitals and nursing note reviewed.   Constitutional:       Appearance: He is well-developed. He is not diaphoretic.   HENT:      Head: Normocephalic.   Eyes:      Conjunctiva/sclera: Conjunctivae normal.      Pupils: Pupils are equal, round, and reactive to light.   Neck:      Thyroid: No thyromegaly.      Vascular: No JVD.    Cardiovascular:      Rate and Rhythm: Normal rate and regular rhythm.      Pulses: Normal pulses.      Heart sounds: Normal heart sounds. No murmur heard.     Pulmonary:      Effort: Pulmonary effort is normal. No respiratory distress.      Breath sounds: Normal breath sounds.   Abdominal:      General: Bowel sounds are normal.      Palpations: Abdomen is soft.      Tenderness: There is no abdominal tenderness.   Musculoskeletal:      Cervical back: Neck supple.   Lymphadenopathy:      Cervical: No cervical adenopathy.   Skin:     General: Skin is warm and dry.      Findings: No rash.   Neurological:      Mental Status: He is alert and oriented to person, place, and time.      Sensory: No sensory deficit.   Psychiatric:         Behavior: Behavior normal.         Assessment/Plan   Problems Addressed this Visit        Cardiac and Vasculature    Mixed hyperlipidemia    HTN (hypertension)    Coronary artery disease involving native heart without angina pectoris       Coag and Thromboembolic    History of DVT (deep vein thrombosis)       Endocrine and Metabolic    DM (diabetes mellitus) (CMS/[facility]) - Primary       Hematology and Neoplasia    Multiple myeloma not having achieved remission (CMS/[facility])      Diagnoses       Codes Comments    Type 2 diabetes mellitus without complication, without long-term current use of insulin (CMS/[facility])    -  Primary ICD-10-CM: E11.9  ICD-9-CM: 250.00     Essential hypertension     ICD-10-CM: I10  ICD-9-CM: 401.9     History of DVT (deep vein thrombosis)     ICD-10-CM: Z86.718  ICD-9-CM: V12.51     Multiple myeloma not having achieved remission (CMS/[facility])     ICD-10-CM: C90.00  ICD-9-CM: 203.00     Coronary artery disease involving native heart without angina pectoris, unspecified vessel or lesion type     ICD-10-CM: I25.10  ICD-9-CM: 414.01     Mixed hyperlipidemia     ICD-10-CM: E78.2  ICD-9-CM: 272.2         No medication changes  No Coumadin dose changes necessary  Continue current  activity levels.  Recheck in 3 months.       Scribed for LINDA Bolden MD by Elin Keenan.  06/15/21   16:09 EDT    I have personally performed the services described in this document as scribed by the above individual, and it is both accurate and complete.  LINDA Bolden MD  6/15/2021  20:56 EDT

## 2021-06-25 NOTE — PROGRESS NOTES
PROBLEM LIST:  Oncology/Hematology History   Multiple myeloma not having achieved remission (CMS/HCC)   8/18/2020 Initial Diagnosis    Multiple myeloma not having achieved remission (CMS/HCC)     8/20/2020 Cancer Staged    Staging form: Plasma Cell Myeloma And Plasma Cell Disorders, AJCC 8th Edition  - Clinical stage from 8/20/2020: RISS Stage III (Beta-2-microglobulin (mg/L): 16, Albumin (g/dL): 2.7, ISS: Stage III, High-risk cytogenetics: Present, LDH: Normal) - Signed by Merry Pelletier MD on 8/24/2020 8/24/2020 Molecular Testing    Molecular testing   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 1q21 gain     8/27/2020 - 1/7/2021 Chemotherapy    OP MULTIPLE MYELOMA Daratumumab / Bortezomib / Revlimid/Dexamethasone     12/7/2020 Transplant    Evaluation performed at the [facility] after induction chemotherapy with DRVD.  Patient was deemed not a candidate for transplant due to compromised ejection fraction - 40%     1/8/2021 - 4/1/2021 Chemotherapy    OP MULTIPLE MYELOMA Daratumumab / Lenalidomide / Dexamethasone     1/8/2021 -  Chemotherapy    OP SUPPORTIVE Denosumab (Xgeva) Q28D     3/19/2021 -  Chemotherapy    OP MULTIPLE MYELOMA Daratumumab / Carfilzomib / Dexamethasone         REASON FOR VISIT: Management of my myeloma    HISTORY OF PRESENT ILLNESS:   65 y.o.  male presents today for follow-up.  He underwent induction chemotherapy with Darzalek Velcade Revlimid and dexamethasone.  He completed 4 cycles with a very nice response.  He underwent a transplant evaluation.  Unfortunately due to suppressed ejection fraction of 40% he was deemed not a candidate for transplant.  He now has progressive disease.  He is currently on daratumumab, carfilzomib and dexamethasone since March 2021.  He continues to tolerate treatment reasonably well. Fatigue is stable.  Denies any recent infections.  Denies nausea vomiting no diarrhea.  No neuropathy.    Past medical history, social history  and family history was reviewed and unchanged from prior visit.    Review of Systems:    Review of Systems   Constitutional: Positive for fatigue.   HENT:  Negative.    Eyes: Negative.    Respiratory: Negative.    Cardiovascular: Negative.    Gastrointestinal: Negative.    Endocrine: Negative.    Genitourinary: Negative.     Musculoskeletal: Negative.    Skin: Negative.    Neurological: Negative.    Hematological: Negative.    Psychiatric/Behavioral: Negative.             Medications:        Current Outpatient Medications:   •  acyclovir (ZOVIRAX) 400 MG tablet, Take 1 tablet by mouth 2 (Two) Times a Day. Take no more than 5 doses a day., Disp: 60 tablet, Rfl: 11  •  aspirin 81 MG EC tablet, Take 81 mg by mouth Daily., Disp: , Rfl:   •  atorvastatin (LIPITOR) 80 MG tablet, TAKE 1 TABLET BY MOUTH IN  THE EVENING, Disp: 90 tablet, Rfl: 3  •  dexamethasone (DECADRON) 4 MG tablet, Take 10 tabs on days 1,8,15,22. Take 1 tab on day 2,3,4,5,6,7,9,10,11,12,13,14,16,18,19,20,21,23,24,, Disp: 48 tablet, Rfl: 11  •  fluocinolone (SYNALAR) 0.01 % external solution, Apply  topically to the appropriate area as directed 2 (Two) Times a Day., Disp: 60 mL, Rfl: 11  •  furosemide (LASIX) 20 MG tablet, TAKE 1 TAB DAY OF CHEMO AND DAY AFTER INFUSION, Disp: 30 tablet, Rfl: 2  •  gabapentin (NEURONTIN) 800 MG tablet, Take 800 mg by mouth 3 (Three) Times a Day., Disp: , Rfl:   •  glimepiride (AMARYL) 2 MG tablet, Take 1 tablet by mouth Daily., Disp: 90 tablet, Rfl: 3  •  glucose blood (OneTouch Ultra) test strip, 1 each by Other route 2 (Two) Times a Day. to check glucose     DX  E11.9, Disp: 100 each, Rfl: 11  •  HumuLIN R 100 UNIT/ML injection, USE ACCORDING TO SLIDING SCALE AT DISCHARGE, Disp: , Rfl:   •  Insulin Glargine (BASAGLAR KWIKPEN) 100 UNIT/ML injection pen, Inject 20 Units under the skin into the appropriate area as directed Daily., Disp: 3 pen, Rfl: 5  •  Insulin Pen Needle 31G X 6 MM misc, 1 stick Daily., Disp: 90 each,  "Rfl: 0  •  lisinopril (PRINIVIL,ZESTRIL) 10 MG tablet, Take 1 tablet by mouth Daily., Disp: 90 tablet, Rfl: 3  •  metFORMIN (GLUCOPHAGE) 1000 MG tablet, TAKE 1 TABLET BY MOUTH  TWICE DAILY WITH MEALS, Disp: 180 tablet, Rfl: 3  •  methadone (DOLOPHINE) 10 MG tablet, Take 30 mg by mouth Every 6 (Six) Hours As Needed for Moderate Pain ,, Disp: , Rfl:   •  metoprolol tartrate (LOPRESSOR) 25 MG tablet, TAKE 1 TABLET BY MOUTH  TWICE DAILY, Disp: 180 tablet, Rfl: 3  •  ondansetron (Zofran) 8 MG tablet, Take 1 tablet by mouth Every 8 (Eight) Hours As Needed for Nausea or Vomiting., Disp: 30 tablet, Rfl: 11  •  OneTouch Delica Lancets 33G misc, 1 application 3 (Three) Times a Day. Check blood sugar three times daily, E11.9, Disp: 100 each, Rfl: 11  •  pantoprazole (PROTONIX) 40 MG EC tablet, TAKE 1 TABLET BY MOUTH  DAILY, Disp: 90 tablet, Rfl: 3  •  sulfamethoxazole-trimethoprim (BACTRIM DS,SEPTRA DS) 800-160 MG per tablet, Take 1 tab 3 times a day on Monday Wednesday and Friday., Disp: 60 tablet, Rfl: 11  •  tiZANidine (ZANAFLEX) 4 MG tablet, Take 4 mg by mouth 3 (Three) Times a Day As Needed for Muscle Spasms., Disp: , Rfl:   •  warfarin (COUMADIN) 5 MG tablet, Take 5 mg by mouth Daily. One pill daily, Disp: , Rfl:     Pain Medications             aspirin 81 MG EC tablet Take 81 mg by mouth Daily.    dexamethasone (DECADRON) 4 MG tablet Take 10 tabs on days 1,8,15,22. Take 1 tab on day 2,3,4,5,6,7,9,10,11,12,13,14,16,18,19,20,21,23,24,    gabapentin (NEURONTIN) 800 MG tablet Take 800 mg by mouth 3 (Three) Times a Day.    methadone (DOLOPHINE) 10 MG tablet Take 30 mg by mouth Every 6 (Six) Hours As Needed for Moderate Pain ,    tiZANidine (ZANAFLEX) 4 MG tablet Take 4 mg by mouth 3 (Three) Times a Day As Needed for Muscle Spasms.             ALLERGIES:  No Known Allergies      Physical Exam    VITAL SIGNS:  /61 Comment: LUE  Pulse 62   Temp 97.4 °F (36.3 °C) (Temporal)   Resp 16   Ht 175.3 cm (69\")   Wt 89.8 kg " (198 lb)   SpO2 99% Comment: RA  BMI 29.24 kg/m²     Wt Readings from Last 3 Encounters:   06/25/21 89.8 kg (198 lb)   06/18/21 87.3 kg (192 lb 8 oz)   06/15/21 88 kg (194 lb)       Body mass index is 29.24 kg/m². Body surface area is 2.06 meters squared.    Pain Score    06/25/21 0813   PainSc: 0-No pain         Performance Status: 0    General: well appearing, in no acute distress  HEENT: sclera anicteric, neck is supple  Lymphatics: no cervical, supraclavicular, or axillary adenopathy  Cardiovascular: regular rate and rhythm, no murmurs, rubs or gallops  Lungs: clear to auscultation bilaterally  Abdomen: soft, nontender, nondistended.  No palpable organomegaly  Extremities: no lower extremity edema  Skin: no rashes, lesions, bruising, or petechiae  Msk:  Shows no weakness of the large muscle groups  Psych: Mood is stable    Lab Results   Component Value Date    FINALDX  08/13/2020      PERIPHERAL BLOOD SMEAR, BONE MARROW ASPIRATE, CLOT SECTION AND BIOPSY WITH FLOW CYTOMETRY, IMMUNOHISTOCHEMISTRY AND IN-SITU HYBRIDIZATION STUDIES:  Pancytopenia.  Involved by CD56 positive lambda clonal plasma cell neoplasm accounting for approximately 50% of marrow cellularity.  Mild reticulin fibrosis identified (MF-1).  No amyloid deposition identified on congo red stain with appropriate control.      PCC/dlb             RECENT LABS:    Lab Results   Component Value Date    HGB 7.9 (L) 06/18/2021    HCT 24.9 (L) 06/18/2021    .2 (H) 06/18/2021    PLT 82 (L) 06/18/2021    WBC 4.54 06/18/2021    NEUTROABS 2.77 06/18/2021    LYMPHSABS 1.45 06/18/2021    MONOSABS 0.27 06/18/2021    EOSABS 0.03 06/18/2021    BASOSABS 0.00 06/18/2021       Lab Results   Component Value Date    GLUCOSE 183 (H) 06/18/2021    BUN 29 (H) 06/18/2021    CREATININE 1.67 (H) 06/18/2021     (L) 06/18/2021    K 5.0 06/18/2021     06/18/2021    CO2 22.0 06/18/2021    CALCIUM 9.0 06/18/2021    PROTEINTOT 7.2 06/18/2021    ALBUMIN 4.10  06/18/2021    BILITOT 0.5 06/18/2021    ALKPHOS 49 06/18/2021    AST 13 06/18/2021    ALT 12 06/18/2021     Lab Results   Component Value Date    MSPIKE 1.4 3/05/2021    MSPIKE 1.0 2/5/2021    MSPIKE 0.9 01/08/2021    MSPIKE NOT QUANTIFIABLE 10/30/2020    MSPIKE 0.5 10/09/2020    MSPIKE 2.0 09/18/2020         MSPIKE         6.0              08/21/2020    Lab Results   Component Value Date    FREEKAPPAL 3.7 06/11/2021    FREEKAPPAL 5.1 05/14/2021    FREEKAPPAL 3.9 04/16/2021     Lab Results   Component Value Date    IGLFLC 379.5 (H) 06/11/2021    IGLFLC 212.5 (H) 05/14/2021    IGLFLC 142.0 (H) 04/16/2021         IGLFLC                                                         1173                                 08/21/2020        Assessment/Plan    1.  Multiple myeloma with renal dysfunction and pancytopenia.  We discussed Mspike had increased to 1.2.   We will continue Darzalex, carfilzomib and dexamethasone with no dose changes today. We will recheck SPEP on day 1 of each cycle.  He has very high risk disease and appears to be fairly resistant.   We will continue Xgeva. Next dose due today.     We will continue to monitor HGB and transfuse for 2 units of HGB less than 8.    2.  Renal dysfunction secondary to myeloma.  Stable.    3.  History of multiple DVTs.  Patient on Coumadin    4.  CAD with depressed EF of 40%    5.  Severe thrombocytopenia related to his chemotherapy.  This does not seem to be a major issue at this time.  Still he is fairly thrombocytopenic.                      EDNA Lambert  [facility]    Return in (Approximately): 1 month    Orders Placed This Encounter   Procedures   • Comprehensive metabolic panel   • B-type natriuretic peptide   • DORA, PE & Free LT Chains, Ser   • Comprehensive metabolic panel   • Comprehensive metabolic panel   • Comprehensive metabolic panel   • CBC and Differential   • CBC and Differential   • CBC and Differential       6/25/2021

## 2021-07-30 NOTE — PROGRESS NOTES
PROBLEM LIST:  Oncology/Hematology History   Multiple myeloma not having achieved remission (CMS/HCC)   8/18/2020 Initial Diagnosis    Multiple myeloma not having achieved remission (CMS/HCC)     8/20/2020 Cancer Staged    Staging form: Plasma Cell Myeloma And Plasma Cell Disorders, AJCC 8th Edition  - Clinical stage from 8/20/2020: RISS Stage III (Beta-2-microglobulin (mg/L): 16, Albumin (g/dL): 2.7, ISS: Stage III, High-risk cytogenetics: Present, LDH: Normal) - Signed by Merry Pelletier MD on 8/24/2020 8/24/2020 Molecular Testing    Molecular testing   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 1q21 gain     8/27/2020 - 1/7/2021 Chemotherapy    OP MULTIPLE MYELOMA Daratumumab / Bortezomib / Revlimid/Dexamethasone     12/7/2020 Transplant    Evaluation performed at the [facility] after induction chemotherapy with DRVD.  Patient was deemed not a candidate for transplant due to compromised ejection fraction - 40%     1/8/2021 - 4/1/2021 Chemotherapy    OP MULTIPLE MYELOMA Daratumumab / Lenalidomide / Dexamethasone     1/8/2021 -  Chemotherapy    OP SUPPORTIVE Denosumab (Xgeva) Q28D     3/19/2021 -  Chemotherapy    OP MULTIPLE MYELOMA Daratumumab / Carfilzomib / Dexamethasone         REASON FOR VISIT: Management of my myeloma    HISTORY OF PRESENT ILLNESS:   65 y.o.  male presents today for follow-up.  He underwent induction chemotherapy with Darzalek Velcade Revlimid and dexamethasone.  He completed 4 cycles with a very nice response.  He underwent a transplant evaluation.  Unfortunately due to suppressed ejection fraction of 40% he was deemed not a candidate for transplant.  He now has progressive disease.  He is currently on daratumumab, carfilzomib and dexamethasone since March 2021.  He is tolerating this reasonably well.  He does have some fatigue issues that comes and goes.  Denies any recent infections.  No issues with nausea vomiting no diarrhea.  No neuropathy. No issues  with heart failure    Past medical history, social history and family history was reviewed and unchanged from prior visit.    Review of Systems:    Review of Systems   Constitutional: Positive for fatigue.   HENT:  Negative.    Eyes: Negative.    Respiratory: Negative.    Cardiovascular: Negative.    Gastrointestinal: Negative.    Endocrine: Negative.    Genitourinary: Negative.     Musculoskeletal: Negative.    Skin: Negative.    Neurological: Negative.    Hematological: Negative.    Psychiatric/Behavioral: Negative.             Medications:        Current Outpatient Medications:   •  acyclovir (ZOVIRAX) 400 MG tablet, Take 1 tablet by mouth 2 (Two) Times a Day. Take no more than 5 doses a day., Disp: 60 tablet, Rfl: 11  •  aspirin 81 MG EC tablet, Take 81 mg by mouth Daily., Disp: , Rfl:   •  atorvastatin (LIPITOR) 80 MG tablet, TAKE 1 TABLET BY MOUTH IN  THE EVENING, Disp: 90 tablet, Rfl: 3  •  dexamethasone (DECADRON) 4 MG tablet, Take 10 tabs on days 1,8,15,22. Take 1 tab on day 2,3,4,5,6,7,9,10,11,12,13,14,16,18,19,20,21,23,24,, Disp: 48 tablet, Rfl: 11  •  fluocinolone (SYNALAR) 0.01 % external solution, Apply  topically to the appropriate area as directed 2 (Two) Times a Day., Disp: 60 mL, Rfl: 11  •  furosemide (LASIX) 20 MG tablet, TAKE 1 TAB DAY OF CHEMO AND DAY AFTER INFUSION, Disp: 30 tablet, Rfl: 2  •  gabapentin (NEURONTIN) 800 MG tablet, Take 800 mg by mouth 3 (Three) Times a Day., Disp: , Rfl:   •  glimepiride (AMARYL) 2 MG tablet, Take 1 tablet by mouth Daily., Disp: 90 tablet, Rfl: 3  •  glucose blood (OneTouch Ultra) test strip, 1 each by Other route 2 (Two) Times a Day. to check glucose     DX  E11.9, Disp: 100 each, Rfl: 11  •  HumuLIN R 100 UNIT/ML injection, USE ACCORDING TO SLIDING SCALE AT DISCHARGE, Disp: , Rfl:   •  Insulin Glargine (BASAGLAR KWIKPEN) 100 UNIT/ML injection pen, Inject 20 Units under the skin into the appropriate area as directed Daily., Disp: 3 pen, Rfl: 5  •  Insulin Pen  Needle 31G X 6 MM misc, 1 stick Daily., Disp: 90 each, Rfl: 0  •  lisinopril (PRINIVIL,ZESTRIL) 10 MG tablet, Take 1 tablet by mouth Daily., Disp: 90 tablet, Rfl: 3  •  metFORMIN (GLUCOPHAGE) 1000 MG tablet, TAKE 1 TABLET BY MOUTH  TWICE DAILY WITH MEALS, Disp: 180 tablet, Rfl: 3  •  methadone (DOLOPHINE) 10 MG tablet, Take 30 mg by mouth Every 6 (Six) Hours As Needed for Moderate Pain ,, Disp: , Rfl:   •  metoprolol tartrate (LOPRESSOR) 25 MG tablet, TAKE 1 TABLET BY MOUTH  TWICE DAILY, Disp: 180 tablet, Rfl: 3  •  ondansetron (ZOFRAN) 8 MG tablet, TAKE 1 TABLET BY MOUTH  EVERY 8 HOURS AS NEEDED FOR NAUSEA AND VOMITING, Disp: 90 tablet, Rfl: 1  •  OneTouch Delica Lancets 33G misc, 1 application 3 (Three) Times a Day. Check blood sugar three times daily, E11.9, Disp: 100 each, Rfl: 11  •  pantoprazole (PROTONIX) 40 MG EC tablet, TAKE 1 TABLET BY MOUTH  DAILY, Disp: 90 tablet, Rfl: 3  •  sulfamethoxazole-trimethoprim (BACTRIM DS,SEPTRA DS) 800-160 MG per tablet, Take 1 tab 3 times a day on Monday Wednesday and Friday., Disp: 60 tablet, Rfl: 11  •  tiZANidine (ZANAFLEX) 4 MG tablet, Take 4 mg by mouth 3 (Three) Times a Day As Needed for Muscle Spasms., Disp: , Rfl:   •  warfarin (COUMADIN) 5 MG tablet, Take 5 mg by mouth Daily. One pill daily, Disp: , Rfl:     Pain Medications             aspirin 81 MG EC tablet Take 81 mg by mouth Daily.    dexamethasone (DECADRON) 4 MG tablet Take 10 tabs on days 1,8,15,22. Take 1 tab on day 2,3,4,5,6,7,9,10,11,12,13,14,16,18,19,20,21,23,24,    gabapentin (NEURONTIN) 800 MG tablet Take 800 mg by mouth 3 (Three) Times a Day.    methadone (DOLOPHINE) 10 MG tablet Take 30 mg by mouth Every 6 (Six) Hours As Needed for Moderate Pain ,    tiZANidine (ZANAFLEX) 4 MG tablet Take 4 mg by mouth 3 (Three) Times a Day As Needed for Muscle Spasms.             ALLERGIES:  No Known Allergies      Physical Exam    VITAL SIGNS:  /61   Pulse 68   Temp 97.3 °F (36.3 °C) (Temporal)   Resp 16   " Ht 175.3 cm (69\")   Wt 89.8 kg (198 lb)   SpO2 98%   BMI 29.24 kg/m²     Wt Readings from Last 3 Encounters:   07/30/21 89.8 kg (198 lb)   07/23/21 91.6 kg (201 lb 14.4 oz)   07/16/21 88.8 kg (195 lb 12.8 oz)       Body mass index is 29.24 kg/m². Body surface area is 2.06 meters squared.    Pain Score    07/30/21 0919   PainSc: 0-No pain         Performance Status: 0    General: well appearing, in no acute distress  HEENT: sclera anicteric, neck is supple  Lymphatics: no cervical, supraclavicular, or axillary adenopathy  Cardiovascular: regular rate and rhythm, no murmurs, rubs or gallops  Lungs: clear to auscultation bilaterally  Abdomen: soft, nontender, nondistended.  No palpable organomegaly  Extremities: no lower extremity edema  Skin: no rashes, lesions, bruising, or petechiae  Msk:  Shows no weakness of the large muscle groups  Psych: Mood is stable    Lab Results   Component Value Date    FINALDX  08/13/2020      PERIPHERAL BLOOD SMEAR, BONE MARROW ASPIRATE, CLOT SECTION AND BIOPSY WITH FLOW CYTOMETRY, IMMUNOHISTOCHEMISTRY AND IN-SITU HYBRIDIZATION STUDIES:  Pancytopenia.  Involved by CD56 positive lambda clonal plasma cell neoplasm accounting for approximately 50% of marrow cellularity.  Mild reticulin fibrosis identified (MF-1).  No amyloid deposition identified on congo red stain with appropriate control.      PCC/dlb             RECENT LABS:    Lab Results   Component Value Date    HGB 8.3 (L) 07/23/2021    HCT 25.2 (L) 07/23/2021    .1 (H) 07/23/2021    PLT 66 (L) 07/23/2021    WBC 8.16 07/23/2021    NEUTROABS 5.13 07/23/2021    LYMPHSABS 1.96 07/23/2021    MONOSABS 0.82 07/23/2021    EOSABS 0.16 07/23/2021    BASOSABS 0.03 07/23/2021       Lab Results   Component Value Date    GLUCOSE 192 (H) 07/23/2021    BUN 20 07/23/2021    CREATININE 1.51 (H) 07/23/2021     (L) 07/23/2021    K 5.0 07/23/2021    CL 97 (L) 07/23/2021    CO2 19.7 (L) 07/23/2021    CALCIUM 9.0 07/23/2021    PROTEINTOT " 7.6 07/23/2021    ALBUMIN 3.90 07/23/2021    BILITOT 0.4 07/23/2021    ALKPHOS 51 07/23/2021    AST 13 07/23/2021    ALT 11 07/23/2021     Lab Results   Component Value Date    MSPIKE 1.5 07/09/2021    MSPIKE 1.2 06/11/2021    MSPIKE 1.4 3/05/2021    MSPIKE 1.0 2/5/2021    MSPIKE 0.9 01/08/2021    MSPIKE NOT QUANTIFIABLE 10/30/2020    MSPIKE 0.5 10/09/2020    MSPIKE 2.0 09/18/2020         MSPIKE         6.0              08/21/2020    Lab Results   Component Value Date    FREEKAPPAL 2.3 (L) 07/09/2021    FREEKAPPAL 3.7 06/11/2021    FREEKAPPAL 5.1 05/14/2021     Lab Results   Component Value Date    IGLFLC 454.0 (H) 07/09/2021    IGLFLC 379.5 (H) 06/11/2021    IGLFLC 212.5 (H) 05/14/2021         IGLFLC                                                         1173                                 08/21/2020        Assessment/Plan    1.  Multiple myeloma with renal dysfunction and pancytopenia.  Continue Darzalex, carfilzomib and dexamethasone with no dose changes.  He is slowly progressing on this therapy.  Even though he is doing well from a tolerance standpoint.  I would like him to speak with Dr. Mendoza regarding options going forward.  One of the drugs that he may be a candidate for is BlenRep.  He was not a candidate for his transplant due to decreased EF.  I will continue his therapy till he visits [facility] and then we can figure out what our next steps are going forward.    3.  Renal dysfunction secondary to myeloma.  Stable.    4.  History of multiple DVTs.  Patient on Coumadin    5.  CAD with depressed EF of 40%    6.  Severe thrombocytopenia related to his chemotherapy.  This does not seem to be a major issue at this time.  Still he is fairly thrombocytopenic.      Total time of patient care on day of service including time prior to, face to face with patient, and following visit spent in reviewing records, lab results, imaging studies, discussion with patient, and documentation/charting was > 40  minutes.        Merry Pelletier MD  [facility]         No orders of the defined types were placed in this encounter.      7/30/2021

## 2021-09-03 NOTE — PROGRESS NOTES
PROBLEM LIST:  Oncology/Hematology History   Multiple myeloma not having achieved remission (CMS/HCC)   8/18/2020 Initial Diagnosis    Multiple myeloma not having achieved remission (CMS/HCC)     8/20/2020 Cancer Staged    Staging form: Plasma Cell Myeloma And Plasma Cell Disorders, AJCC 8th Edition  - Clinical stage from 8/20/2020: RISS Stage III (Beta-2-microglobulin (mg/L): 16, Albumin (g/dL): 2.7, ISS: Stage III, High-risk cytogenetics: Present, LDH: Normal) - Signed by Merry Pelletier MD on 8/24/2020 8/24/2020 Molecular Testing    Molecular testing   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 1q21 gain     8/27/2020 - 1/7/2021 Chemotherapy    OP MULTIPLE MYELOMA Daratumumab / Bortezomib / Revlimid/Dexamethasone     12/7/2020 Transplant    Evaluation performed at the [facility] after induction chemotherapy with DRVD.  Patient was deemed not a candidate for transplant due to compromised ejection fraction - 40%     1/8/2021 - 4/1/2021 Chemotherapy    OP MULTIPLE MYELOMA Daratumumab / Lenalidomide / Dexamethasone     1/8/2021 -  Chemotherapy    OP SUPPORTIVE Denosumab (Xgeva) Q28D     3/19/2021 -  Chemotherapy    OP MULTIPLE MYELOMA Daratumumab / Carfilzomib / Dexamethasone     10/1/2021 -  Chemotherapy    OP MULTIPLE MYELOMA Daratumumab / Pomalidomide / Dexamethasone          REASON FOR VISIT: Management of my myeloma    HISTORY OF PRESENT ILLNESS:   66 y.o.  male presents today for follow-up.  He underwent induction chemotherapy with Darzalek Velcade Revlimid and dexamethasone.  He completed 4 cycles with a very nice response.  He underwent a transplant evaluation.  Unfortunately due to suppressed ejection fraction of 40% he was deemed not a candidate for transplant.  He now has progressive disease.  He is currently on daratumumab, carfilzomib and dexamethasone since March 2021.     However he has had progressive M spike since I started him on treatment.  He is not getting a  very good response to this regimen.  He is still undergoing evaluation for possible other alternatives at .  Some of it is delayed due to Covid and need for additional testing.  Clinically he is doing reasonably well.  Unfortunately some of his cytopenias is affected how we can treat him going forward.      Past medical history, social history and family history was reviewed and unchanged from prior visit.    Review of Systems:    Review of Systems   Constitutional: Positive for fatigue.   HENT:  Negative.    Eyes: Negative.    Respiratory: Negative.    Cardiovascular: Negative.    Gastrointestinal: Negative.    Endocrine: Negative.    Genitourinary: Negative.     Musculoskeletal: Negative.    Skin: Negative.    Neurological: Negative.    Hematological: Negative.    Psychiatric/Behavioral: Negative.             Medications:        Current Outpatient Medications:   •  acyclovir (ZOVIRAX) 400 MG tablet, Take 1 tablet by mouth 2 (Two) Times a Day. Take no more than 5 doses a day., Disp: 60 tablet, Rfl: 11  •  aspirin 81 MG EC tablet, Take 81 mg by mouth Daily., Disp: , Rfl:   •  atorvastatin (LIPITOR) 80 MG tablet, TAKE 1 TABLET BY MOUTH IN  THE EVENING, Disp: 90 tablet, Rfl: 3  •  dexamethasone (DECADRON) 4 MG tablet, Take 10 tabs on days 1,8,15,22. Take 1 tab on day 2,3,4,5,6,7,9,10,11,12,13,14,16,18,19,20,21,23,24,, Disp: 48 tablet, Rfl: 11  •  fluocinolone (SYNALAR) 0.01 % external solution, Apply  topically to the appropriate area as directed 2 (Two) Times a Day., Disp: 60 mL, Rfl: 11  •  furosemide (LASIX) 20 MG tablet, TAKE 1 TAB DAY OF CHEMO AND DAY AFTER INFUSION, Disp: 30 tablet, Rfl: 2  •  gabapentin (NEURONTIN) 800 MG tablet, Take 800 mg by mouth 3 (Three) Times a Day., Disp: , Rfl:   •  glimepiride (AMARYL) 2 MG tablet, Take 1 tablet by mouth Daily., Disp: 90 tablet, Rfl: 3  •  glucose blood (OneTouch Ultra) test strip, 1 each by Other route 2 (Two) Times a Day. to check glucose     DX  E11.9, Disp: 100  each, Rfl: 11  •  HumuLIN R 100 UNIT/ML injection, USE ACCORDING TO SLIDING SCALE AT DISCHARGE, Disp: , Rfl:   •  Insulin Glargine (BASAGLAR KWIKPEN) 100 UNIT/ML injection pen, Inject 20 Units under the skin into the appropriate area as directed Daily., Disp: 3 pen, Rfl: 5  •  Insulin Pen Needle 31G X 6 MM misc, 1 stick Daily., Disp: 90 each, Rfl: 0  •  lisinopril (PRINIVIL,ZESTRIL) 10 MG tablet, TAKE 1 TABLET BY MOUTH  DAILY, Disp: 90 tablet, Rfl: 3  •  metFORMIN (GLUCOPHAGE) 1000 MG tablet, TAKE 1 TABLET BY MOUTH  TWICE DAILY WITH MEALS, Disp: 180 tablet, Rfl: 3  •  methadone (DOLOPHINE) 10 MG tablet, Take 30 mg by mouth Every 6 (Six) Hours As Needed for Moderate Pain ,, Disp: , Rfl:   •  metoprolol tartrate (LOPRESSOR) 25 MG tablet, TAKE 1 TABLET BY MOUTH  TWICE DAILY, Disp: 180 tablet, Rfl: 3  •  ondansetron (ZOFRAN) 8 MG tablet, TAKE 1 TABLET BY MOUTH  EVERY 8 HOURS AS NEEDED FOR NAUSEA AND VOMITING, Disp: 90 tablet, Rfl: 1  •  OneTouch Delica Lancets 33G misc, 1 application 3 (Three) Times a Day. Check blood sugar three times daily, E11.9, Disp: 100 each, Rfl: 11  •  pantoprazole (PROTONIX) 40 MG EC tablet, TAKE 1 TABLET BY MOUTH  DAILY, Disp: 90 tablet, Rfl: 3  •  sulfamethoxazole-trimethoprim (BACTRIM DS,SEPTRA DS) 800-160 MG per tablet, Take 1 tab 3 times a day on Monday Wednesday and Friday., Disp: 60 tablet, Rfl: 11  •  tiZANidine (ZANAFLEX) 4 MG tablet, Take 4 mg by mouth 3 (Three) Times a Day As Needed for Muscle Spasms., Disp: , Rfl:   •  warfarin (COUMADIN) 5 MG tablet, Take 5 mg by mouth Daily. One pill daily, Disp: , Rfl:     Pain Medications             aspirin 81 MG EC tablet Take 81 mg by mouth Daily.    dexamethasone (DECADRON) 4 MG tablet Take 10 tabs on days 1,8,15,22. Take 1 tab on day 2,3,4,5,6,7,9,10,11,12,13,14,16,18,19,20,21,23,24,    gabapentin (NEURONTIN) 800 MG tablet Take 800 mg by mouth 3 (Three) Times a Day.    methadone (DOLOPHINE) 10 MG tablet Take 30 mg by mouth Every 6 (Six)  Hours As Needed for Moderate Pain ,    tiZANidine (ZANAFLEX) 4 MG tablet Take 4 mg by mouth 3 (Three) Times a Day As Needed for Muscle Spasms.             ALLERGIES:  No Known Allergies      Physical Exam    VITAL SIGNS:  /60   Pulse 75   Temp 97.3 °F (36.3 °C)   Resp 20   Wt 90.7 kg (200 lb)   SpO2 99%   BMI 29.53 kg/m²     Wt Readings from Last 3 Encounters:   09/03/21 90.7 kg (200 lb)   08/27/21 92.5 kg (204 lb)   08/20/21 88.5 kg (195 lb)       Body mass index is 29.53 kg/m². Body surface area is 2.07 meters squared.    Pain Score    09/03/21 0918   PainSc: 0-No pain         Performance Status: 0    General: well appearing, in no acute distress  HEENT: sclera anicteric, neck is supple  Lymphatics: no cervical, supraclavicular, or axillary adenopathy  Cardiovascular: regular rate and rhythm, no murmurs, rubs or gallops  Lungs: clear to auscultation bilaterally  Abdomen: soft, nontender, nondistended.  No palpable organomegaly  Extremities: no lower extremity edema  Skin: no rashes, lesions, bruising, or petechiae  Msk:  Shows no weakness of the large muscle groups  Psych: Mood is stable    Lab Results   Component Value Date    FINALDX  08/13/2020      PERIPHERAL BLOOD SMEAR, BONE MARROW ASPIRATE, CLOT SECTION AND BIOPSY WITH FLOW CYTOMETRY, IMMUNOHISTOCHEMISTRY AND IN-SITU HYBRIDIZATION STUDIES:  Pancytopenia.  Involved by CD56 positive lambda clonal plasma cell neoplasm accounting for approximately 50% of marrow cellularity.  Mild reticulin fibrosis identified (MF-1).  No amyloid deposition identified on congo red stain with appropriate control.      PCC/dlb             RECENT LABS:    Lab Results   Component Value Date    HGB 7.3 (L) 08/27/2021    HCT 23.2 (L) 08/27/2021    .9 (H) 08/27/2021    PLT 57 (L) 08/27/2021    WBC 3.56 08/27/2021    NEUTROABS 1.35 (L) 08/27/2021    LYMPHSABS 1.72 08/27/2021    MONOSABS 0.36 08/27/2021    EOSABS 0.03 08/27/2021    BASOSABS 0.01 08/27/2021       Lab  Results   Component Value Date    GLUCOSE 230 (H) 08/27/2021    BUN 25 (H) 08/27/2021    CREATININE 1.75 (H) 08/27/2021     08/27/2021    K 5.3 (H) 08/27/2021     08/27/2021    CO2 22.1 08/27/2021    CALCIUM 8.6 08/27/2021    PROTEINTOT 7.9 08/27/2021    ALBUMIN 3.50 08/27/2021    BILITOT 0.6 08/27/2021    ALKPHOS 44 08/27/2021    AST 21 08/27/2021    ALT 10 08/27/2021     Lab Results   Component Value Date    MSPIKE 2.0 08/12/2021    MSPIKE 1.5 07/09/2021    MSPIKE 1.2 06/11/2021    MSPIKE 1.4 3/05/2021    MSPIKE 1.0 2/5/2021    MSPIKE 0.9 01/08/2021    MSPIKE NOT QUANTIFIABLE 10/30/2020    MSPIKE 0.5 10/09/2020    MSPIKE 2.0 09/18/2020         MSPIKE         6.0              08/21/2020    Lab Results   Component Value Date    FREEKAPPAL 2.2 (L) 08/06/2021    FREEKAPPAL 2.3 (L) 07/09/2021    FREEKAPPAL 3.7 06/11/2021     Lab Results   Component Value Date    IGLFLC 570.4 (H) 08/06/2021    IGLFLC 454.0 (H) 07/09/2021    IGLFLC 379.5 (H) 06/11/2021         IGLFLC                                                         1173                                 08/21/2020        Assessment/Plan    1.  Multiple myeloma with renal dysfunction and pancytopenia.  I think we really need to change his treatment plan going forward.  I am going to discontinue carfilzomib for now.  I am going to continue him on daratumumab and dexamethasone.  I am good to add Pomalyst at 4 mg days 1 -21 on a 28-day cycle.  We will have to see if he responds to this regimen.  Hopefully this can delay his treatment.  I am going to maintain his daratumumab at this point at once a month since he is already got 6 months of loading.    3.  Renal dysfunction secondary to myeloma.  Stable.    4.  History of multiple DVTs.  Patient on Coumadin    5.  CAD with depressed EF of 40%.  I am going to recheck his echo since we are having a hard time getting that done at .    6.  Severe thrombocytopenia related to his chemotherapy.  This has caused us  to  have treatment delays.      Total time of patient care on day of service including time prior to, face to face with patient, and following visit spent in reviewing records, lab results, imaging studies, discussion with patient, and documentation/charting was > 42 minutes.        Merry Pelletier MD  [facility]    Return on: 10/01/21  Return in (Approximately): 1 month, Schedule with next infusion    Orders Placed This Encounter   Procedures   • Adult Transthoracic Echo Complete W/ Cont if Necessary Per Protocol       9/3/2021

## 2021-09-07 NOTE — PLAN OF CARE
Specialty Pharmacy Assessment    Pomalidomide (Pomalyst)  Dose: 4 mg  Frequency: Once a day for 21 days, followed by 7 days off  Indication: Multiple myeloma  Goals of therapy: Palliative  Anticipated start date: 10/1/21  New Start: Yes  Prescription submitted to: Other  Other pharmacy: [facility]  Supportive care medications: Concomitant anticoagulant or antithrombotic prophylactic treatment prescribed (eg. aspirin, low molecular weight heparin), Other  Other supportive care medications: Denosumab SQ q28 days + VitaminD daily + Calcium daily + daratumumab SQ q28 days + dexamethasone 40mg po once weekly + acyclovir 400mg po BID + Bactrim DS po M/W/F + Warfarin 5mg po daily + aspirin 81mg po daily (Provider who manages warfarin wants patient on both warfarin + aspirin 81mg) + zofran 8mg po tid prn n/v  Counseling: Take capsules at the same time each day, Take capsules with or without food, Swallow capsules whole. Do not crush, cut, open, or dissolve capsules, It should not be handled by people other than patient or caregivers, and especially not by females who are or may become pregnant or are breastfeeding, Store medication at room temperature in a tightly closed container out of reach of children, If a dose is missed, only take your missed dose if it has been less than 12 hours since you were supposed to take it. Then, return to your regularly scheduled time the next day, Do not take two doses at one time, Use multiple forms of contraception for at least four weeks after stopping the medication, Do NOT donate blood while on medication and for four weeks after stopping  Most common side effects: Diarrhea, Fatigue, Constipation, Back pain, Shortness of breath, Respiratory tract infection  Serious side effects: Decreased white blood cells and increased risk for infection, Decreased red blood cells or hemoglobin, Blood clots, Birth defects  Additional notes: Discussed aforementioned material over the telephone. Reminded  patient of the pharmacist's contact information and instructions to call should additional questions arise. All questions and concerns addressed. Completed a medication reconciliation. Provided appropriate educational materials and a personalized calendar of tentative treatment cycles. Patient is planning to continue on treatment until disease progression or unacceptable toxicity occurs.

## 2021-09-07 NOTE — PROGRESS NOTES
Specialty Pharmacy Assessment    Pomalidomide (Pomalyst)  Dose: 4 mg  Frequency: Once a day for 21 days, followed by 7 days off  Indication: Multiple myeloma  Goals of therapy: Palliative  Anticipated start date: 10/1/21  New Start: Yes  Prescription submitted to: Other  Other pharmacy: [facility]  REMS program: Yes  Supportive care medications: Concomitant anticoagulant or antithrombotic prophylactic treatment prescribed (eg. aspirin, low molecular weight heparin)  Additional notes: Oral chemotherapy clinic received referral from Dr. Caballero regarding the initiation of pomalidomide + daratumumab + dexamethasone. Reviewed patient chart.  Patient is s/p multiple lines of treatment: marilou/bortezmib/lenalidomide/dex, marilou/lenalidomide/dex, and marilou/carfilozimib/dex. Patient is also receiving Xgeva. Confirm patient is taking calcium + vitD.  Due to progression of M spike, patient will continue daratumumab and dexamethasone and start pomalidomide.   Released script for pomalidomide 4mg PO daily Days 1-21 then 7 days off, #21 with 0 RF to [facility] to begin processing. Patient is currently on coumadin, Bactrim and acyclovir.  Confirm patient has stopped taking aspirin. No known drug allergies. No DDI to report related to pomalidomide. Reviewed labs from 9/3/21. Pt to be scheduled for oral chemotherapy education and financial navigation appt.  Will obtain consent and CCA at that time.

## 2021-09-08 NOTE — TELEPHONE ENCOUNTER
Critical Test Results      MD: Fedeirco     Date: 9/8     Critical test result: Platelets 38, FYI  (Hgb 8, Hct 24.3)    Time results received:8:59

## 2021-09-15 NOTE — TELEPHONE ENCOUNTER
JESSICA WIFE  CALLED AND STATED THAT SHE WAS EMOTIONAL AND IN A PANIC. SHE DOESN'T NEED A CALL BACK. SHE REMEMBERS DR. ADLER TELLING HER THAT IT WAS IN REGARDS TO HIS ANTI BODIES.   THANK YOU

## 2021-09-15 NOTE — TELEPHONE ENCOUNTER
Nunu patient's wife left a voicemail she is concerned that he is having to get blood once a week, and says the last one was not given. She needs a call to discuss this.

## 2021-09-15 NOTE — TELEPHONE ENCOUNTER
Critical Test Results      MD: Federico     Date: 9/15     Critical test result:Platelets 20, Hgb 8.1    Time results received:8:44    Does he start Pomalyst now?

## 2021-09-16 NOTE — TELEPHONE ENCOUNTER
Called patient's wife leaving  that nurse has added referral to cardiologist per Dr. Caballero and that it's sent on to scheduling to get patient seen by cardiologist.

## 2021-09-19 NOTE — ADDENDUM NOTE
Encounter addended by: Dora Collazo on: 9/19/2021 8:18 AM   Actions taken: Result unverified, Diagnosis association updated, Test resulted, Media attached, Final result verified

## 2021-09-27 NOTE — PROGRESS NOTES
============    BONE MARROW PROCEDURE      =========    Indication:  Myeloma      Procedure: Bone marrow biopsy and aspirate      Patient was consented.  Consent was signed and placed on the chart.  Timeout was performed prior to the procedure.  Patient was premedicated with 0 25 mg of Demerol IV and 25 mg of Benadryl IV.    Patient was laid on the left lateral decubitus position.  The right iliac crest was identified and prepped with iodine.  1% lidocaine was used to anesthetize the skin surface and the iliac crest.  A knife stick was performed, and the Jamshidi needle was inserted and anchored in the iliac crest.  A bone marrow aspirate was performed without difficulty applying suction with a 20 cc syringe.  The needle was repositioned and advanced into the iliac crest to obtain a bone marrow biopsy.    Patient tolerated the procedure well with minimal discomfort and will follow up with me in 1 week for the results.  He is discharged home with his caregiver.

## 2021-09-27 NOTE — TELEPHONE ENCOUNTER
----- Message from Keely Enciso RN sent at 9/27/2021 11:23 AM EDT -----      Critical Test Results      MD: Federico    Date: 9/27/2021     Critical test result:  Platelet 25K    Time results received:  11:22

## 2021-09-27 NOTE — TELEPHONE ENCOUNTER
6/22/2021        RE: Kenyatta Donald  [address]  [facility] [address]        [facility]  [facility] Record Number:  [id number]  Place of Service where encounter took place:  [facility] ([facility]) [25]  Chief Complaint   Patient presents with     Nursing Home Acute       HPI:    Kenyatta Donald  is a 82 year old (1938), who is being seen today for an episodic care visit.  HPI information obtained from: facility chart records, facility staff, patient report and [facility] chart review. Today's concern is:  Pneumonia/hypoxia: patient is off O2, SaO2 94% on room air, no c/o SOB, cough or congestion, working with therapy needing CGA for bed mobility, ADL's, standing at bedside, not walking, looking at moving to LTC  Cognitive impairement: BIMS 10/15, SBT 10/28, will be transitioning to LTC  Urinary Retention/UTI:  patient voiding incontinent brief. Started on bactrim for UTI > 100,000 gram negative bacilli  HTN/atrial fib/CHF: BP as follows: 102/67, 112/45, 108/51 Weight stable, admit weight 177.7lbs  current weight 174.lbs  Spinal muscular atrophy: in therapy patient is doing some static standing with therapy, stand pivot transfers. using EZ lift with nursing    Past Medical and Surgical History reviewed in Epic today.    MEDICATIONS:    Current Outpatient Medications   Medication Sig Dispense Refill     acetaminophen (TYLENOL) 325 MG tablet Take 2 tablets (650 mg) by mouth every 4 hours as needed for mild pain 100 tablet      allopurinol (ZYLOPRIM) 100 MG tablet Take 1 tablet (100 mg) by mouth daily (Patient taking differently: Take 100 mg by mouth every morning ) 90 tablet 3     amLODIPine (NORVASC) 5 MG tablet Take 1 tablet (5 mg) by mouth 2 times daily LAST REFILL WITHOUT AN APPOINTMENT 180 tablet 0     ASPIRIN NOT PRESCRIBED (INTENTIONAL) Please choose reason not prescribed, below 1 each 0     cholecalciferol (VITAMIN D) 1000 UNIT tablet Take 2,000  Dr. Cablalero aware orders for PRBC are in.    "Units by mouth every morning        fluticasone (FLONASE) 50 MCG/ACT nasal spray Spray 1-2 sprays into both nostrils At Bedtime        furosemide (LASIX) 20 MG tablet TAKE 1 TABLET(20 MG) BY MOUTH EVERY MORNING (Patient taking differently: Take 20 mg by mouth every morning ) 270 tablet 0     guaiFENesin (ROBITUSSIN) 20 mg/mL SOLN solution Take 5 mLs by mouth every 6 hours as needed for cough       ipratropium-albuterol (COMBIVENT RESPIMAT)  MCG/ACT inhaler Inhale 1 puff into the lungs 4 times daily       levothyroxine (SYNTHROID/LEVOTHROID) 100 MCG tablet Take 1 tablet (100 mcg) by mouth daily 90 tablet 3     lisinopril (ZESTRIL) 20 MG tablet Take 1 tablet (20 mg) by mouth 2 times daily Needs an appointment for refills. 180 tablet 0     multivitamin w/minerals (THERA-VIT-M) tablet Take 1 tablet by mouth daily       nystatin (MYCOSTATIN) 719874 UNIT/GM external powder Apply topically 2 times daily (Patient taking differently: Apply topically 2 times daily as needed (groin rash) ) 60 Bottle 1     pantoprazole (PROTONIX) 40 MG EC tablet Take 1 tablet (40 mg) by mouth every morning (before breakfast)       tamsulosin (FLOMAX) 0.4 MG capsule Take 1 capsule (0.4 mg) by mouth daily       warfarin ANTICOAGULANT (COUMADIN) 1 MG tablet TAKE 2 TABLETS BY MOUTH EVERY MONDAY, WEDNESDAY, FRIDAY, SATURDAY. DOSE MAY CHANGE PER INR RESULT AS DIRECTED 102 tablet 1         REVIEW OF SYSTEMS:  10 point ROS of systems including Constitutional, Eyes, Respiratory, Cardiovascular, Gastroenterology, Genitourinary, Integumentary, Musculoskeletal, Psychiatric were all negative except for pertinent positives noted in my HPI.    Objective:  /67   Pulse 83   Temp 97.1  F (36.2  C)   Resp 16   Ht 1.651 m (5' 5\")   Wt 78.9 kg (174 lb)   LMP  (LMP Unknown)   SpO2 91%   BMI 28.96 kg/m    Exam:  GENERAL APPEARANCE:  Alert, in no distress  ENT:  Mouth and posterior oropharynx normal, moist mucous membranes, [place]  EYES:  EOM, " conjunctivae, lids, pupils and irises normal, PERRL  RESP:  respiratory effort and palpation of chest normal, lungs clear to auscultation , no respiratory distress, diminished breath sounds bases bilaterally  CV:  Palpation and auscultation of heart done , regular rate and rhythm, no murmur, rub, or gallop, peripheral edema trace+ in LE bilaterally  ABDOMEN:  normal bowel sounds, soft, nontender, no hepatosplenomegaly or other masses  M/S:   patient resting in bed, able to move all 4 extremities  SKIN:  Inspection of skin and subcutaneous tissue baseline  NEURO:   speech wnl    Labs:     Most Recent 3 CBC's:  Recent Labs   Lab Test 06/01/21 05/20/21  0914 05/19/21  1152 05/18/21  1532   WBC  --  10.0 7.2 11.3*   HGB 11.0* 10.8* 10.2* 11.0*   MCV  --  93 93 91   PLT  --  283 244 274     Most Recent 3 BMP's:  Recent Labs   Lab Test 06/21/21 06/01/21 05/24/21  0830    140 144   POTASSIUM 5.2* 4.7 3.5   CHLORIDE 103 97* 108   CO2 23 35* 33*   BUN 62* 37* 23   CR 1.57* 0.66 0.83   ANIONGAP 10 8 3   VICKIE 10.9* 10.8* 9.9   GLC 64* 75 87       ASSESSMENT/PLAN:  Pneumonia of both lungs due to infectious organism, unspecified part of lung  Physical deconditioning  Acute/ongoing: PT and OT, augmentin finished, prednisone taper is complete, IS QID and prn, monitor SaO2 at rest and with activity, keep SaO2 > 90%  Weaned off O2     Dysphagia:   Acute/ongoing:  continue soft and bite sized diet with thin liquids     Cognitive impairement  Acute/ongoing: cognitive impairement, OT ongoing     Urinary retention  UTI  Acute/ongoing: continue flomax 0.4mg, DC mac on Monday, PVR q shift, straight cath for PVR > 350cc  Started on bactrim ds on 6/18/21 will continue for 7 days     Chronic diastolic heart failure (H)  Chronic atrial fibrillation (H)  Benign essential hypertension  Long term current use of anticoagulant therapy  Acute/ongoing: daily weights, vitals daily and prn, BMP follow, continue lisinopril 20mg BID, Lasix 20mg  QD, norvasc 5mg BID, coumadin as directed INR goal of 2-3     Spinal muscular atrophy type III causing decreased ability of respiratory mm-onset 42y/o   Ongoing: functional decline at home, PT ongoing, patient will be transitioning to LTC     Orders  No new orders today      Electronically signed by:  Tonya Lynn Haase, APRN CNP               Sincerely,        Tonya Lynn Haase, APRN CNP

## 2021-09-28 NOTE — PROGRESS NOTES
Lab specimen hemolized.  Patient had already left when notified.  Msg sent to have [facility] OP Onc draw ant 10/1 visit.

## 2021-09-28 NOTE — TELEPHONE ENCOUNTER
Caller: [facility]    Relationship: Provider    Best call back number:[phone]    What orders are you requesting (i.e. lab or imaging): PRE-AUTH. FOR TRANSFUSION    In what timeframe would the patient need to come in:  ASAP AS PATIENT IS WAITING    Where will you receive your lab/imaging services: [facility]

## 2021-09-29 PROBLEM — I50.22 CHRONIC SYSTOLIC CONGESTIVE HEART FAILURE (HCC): Status: ACTIVE | Noted: 2021-01-01

## 2021-09-29 NOTE — PROGRESS NOTES
"    Subjective:     Encounter Date:09/29/2021      Patient ID: Gene Bond is a 66 y.o. male.    Chief Complaint: Congestive heart failure  HPI  This is a 66-year-old male patient with a longstanding history of systolic congestive heart failure dating back over 10 years.  The patient has been diagnosed with multiple myeloma.  The patient's wife indicates that he has been given a \"terminal\" prognosis.  The patient is currently planning to undergo a new form of chemotherapy.  There is concerned that this may have cardiac toxicities.  The patient is currently confined to a wheelchair.  He has had no change in his baseline dyspnea.  He has had no orthopnea PND or lower extremity edema.  The following portions of the patient's history were reviewed and updated as appropriate: allergies, current medications, past family history, past medical history, past social history, past surgical history and problem  Review of Systems   Constitutional: Negative for chills, diaphoresis, fever, malaise/fatigue, weight gain and weight loss.   HENT: Negative for ear discharge, hearing loss, hoarse voice and nosebleeds.    Eyes: Negative for discharge, double vision, pain and photophobia.   Cardiovascular: Negative for chest pain, claudication, cyanosis, irregular heartbeat, leg swelling, near-syncope, orthopnea, palpitations, paroxysmal nocturnal dyspnea and syncope.   Respiratory: Positive for shortness of breath. Negative for cough, hemoptysis, sputum production and wheezing.    Endocrine: Negative for cold intolerance, heat intolerance, polydipsia, polyphagia and polyuria.   Hematologic/Lymphatic: Negative for adenopathy and bleeding problem. Does not bruise/bleed easily.   Skin: Negative for color change, flushing, itching and rash.   Musculoskeletal: Negative for muscle cramps, muscle weakness, myalgias and stiffness.   Gastrointestinal: Negative for abdominal pain, diarrhea, hematemesis, hematochezia, nausea and vomiting. "   Genitourinary: Negative for dysuria, frequency and nocturia.   Neurological: Negative for focal weakness, loss of balance, numbness, paresthesias and seizures.   Psychiatric/Behavioral: Negative for altered mental status, hallucinations and suicidal ideas.   Allergic/Immunologic: Negative for HIV exposure, hives and persistent infections.           Current Outpatient Medications:   •  acyclovir (ZOVIRAX) 400 MG tablet, Take 1 tablet by mouth 2 (Two) Times a Day. Take no more than 5 doses a day., Disp: 60 tablet, Rfl: 11  •  aspirin 81 MG EC tablet, Take 81 mg by mouth Daily., Disp: , Rfl:   •  atorvastatin (LIPITOR) 80 MG tablet, TAKE 1 TABLET BY MOUTH IN  THE EVENING, Disp: 90 tablet, Rfl: 3  •  dexamethasone (DECADRON) 4 MG tablet, Take 10 tabs on days 1,8,15,22. Take 1 tab on day 2,3,4,5,6,7,9,10,11,12,13,14,16,18,19,20,21,23,24,, Disp: 48 tablet, Rfl: 11  •  furosemide (LASIX) 20 MG tablet, TAKE 1 TAB DAY OF CHEMO AND DAY AFTER INFUSION, Disp: 30 tablet, Rfl: 2  •  gabapentin (NEURONTIN) 800 MG tablet, Take 800 mg by mouth 3 (Three) Times a Day., Disp: , Rfl:   •  glimepiride (AMARYL) 2 MG tablet, Take 1 tablet by mouth Daily., Disp: 90 tablet, Rfl: 3  •  glucose blood (OneTouch Ultra) test strip, 1 each by Other route 2 (Two) Times a Day. to check glucose     DX  E11.9, Disp: 100 each, Rfl: 11  •  Insulin Pen Needle 31G X 6 MM misc, 1 stick Daily., Disp: 90 each, Rfl: 0  •  lisinopril (PRINIVIL,ZESTRIL) 10 MG tablet, TAKE 1 TABLET BY MOUTH  DAILY, Disp: 90 tablet, Rfl: 3  •  metFORMIN (GLUCOPHAGE) 1000 MG tablet, TAKE 1 TABLET BY MOUTH  TWICE DAILY WITH MEALS, Disp: 180 tablet, Rfl: 3  •  methadone (DOLOPHINE) 10 MG tablet, Take 30 mg by mouth Every 6 (Six) Hours As Needed for Moderate Pain ,, Disp: , Rfl:   •  metoprolol tartrate (LOPRESSOR) 25 MG tablet, TAKE 1 TABLET BY MOUTH  TWICE DAILY, Disp: 180 tablet, Rfl: 3  •  ondansetron (ZOFRAN) 8 MG tablet, TAKE 1 TABLET BY MOUTH  EVERY 8 HOURS AS NEEDED FOR  NAUSEA AND VOMITING, Disp: 90 tablet, Rfl: 1  •  OneTouch Delica Lancets 33G misc, 1 application 3 (Three) Times a Day. Check blood sugar three times daily, E11.9, Disp: 100 each, Rfl: 11  •  pantoprazole (PROTONIX) 40 MG EC tablet, TAKE 1 TABLET BY MOUTH  DAILY, Disp: 90 tablet, Rfl: 3  •  [START ON 9/30/2021] pomalidomide (POMALYST) 4 MG chemo capsule, Take 1 capsule by mouth Daily. Take 4mg PO daily on D1-21, then off 7 days. Adult male REMS [id number], Disp: 21 capsule, Rfl: 0  •  sulfamethoxazole-trimethoprim (BACTRIM DS,SEPTRA DS) 800-160 MG per tablet, Take 1 tab 3 times a day on Monday Wednesday and Friday., Disp: 60 tablet, Rfl: 11  •  tiZANidine (ZANAFLEX) 4 MG tablet, Take 4 mg by mouth 3 (Three) Times a Day As Needed for Muscle Spasms., Disp: , Rfl:   •  warfarin (COUMADIN) 5 MG tablet, Take 5 mg by mouth Daily. One pill daily, Disp: , Rfl:   No current facility-administered medications for this visit.    Objective:   Vitals and nursing note reviewed.   Constitutional:       Appearance: Healthy appearance. Not in distress.   Neck:      Vascular: No JVR. JVD normal.   Pulmonary:      Effort: Pulmonary effort is normal.      Breath sounds: Normal breath sounds. No wheezing. No rhonchi. No rales.   Chest:      Chest wall: Not tender to palpatation.   Cardiovascular:      PMI at left midclavicular line. Normal rate. Regular rhythm. Normal S1. Normal S2.      Murmurs: There is no murmur.      No gallop. No click. No rub.   Pulses:     Intact distal pulses.   Edema:     Peripheral edema absent.   Abdominal:      General: Bowel sounds are normal.      Palpations: Abdomen is soft.      Tenderness: There is no abdominal tenderness.   Musculoskeletal: Normal range of motion.         General: No tenderness. Skin:     General: Skin is warm and dry.   Neurological:      General: No focal deficit present.      Mental Status: Alert and oriented to person, place and time.       Blood pressure 104/64, pulse 79, height 175.3  "cm (69\"), weight 87.5 kg (193 lb), SpO2 94 %.   Lab Review:     Assessment:       1. Multiple myeloma not having achieved remission (CMS/HCC)  Advanced stage disease.    2. Chronic systolic congestive heart failure (HCC)  Heart failure with reduced ejection fraction.  Stage C.  New York Heart Association functional class I symptoms.  Euvolemic.      ECG 12 Lead    Date/Time: 9/29/2021 2:52 PM  Performed by: Amadeo Pang MD  Authorized by: Amadeo Pang MD   Previous ECG: no previous ECG available  Rhythm: sinus rhythm  Rate: normal  Conduction: right bundle branch block  Q waves: V1, V2, V3, V4 and aVL    QRS axis: right  Other findings: non-specific ST-T wave changes    Clinical impression: abnormal EKG            Plan:     I have recommended the patient seek an opinion at the [facility] cardio-oncology clinic.  I have informed the patient and his wife that I am not well versed in the topic of cardio-oncology.  I am not familiar with the chemotherapy medication which he is planned to receive.  I have advised the patient that he needs to have a raffaele discussion with his oncologist regarding the potential for cardiac toxicity weighed against the potential that chemotherapy will offer significant survival.  No changes in his medications have been made at today's visit.  No additional cardiovascular testing is warranted.      "

## 2021-10-01 ENCOUNTER — HOSPITAL ENCOUNTER (OUTPATIENT)
Dept: NURSING | Facility: HOSPITAL | Age: 66
Setting detail: INFUSION SERIES
Discharge: HOME | End: 2021-10-03
Payer: MEDICARE

## 2021-10-01 DIAGNOSIS — C90.00 MULTIPLE MYELOMA NOT HAVING ACHIEVED REMISSION (HCC): ICD-10-CM

## 2021-10-01 LAB
ABO/RH: NORMAL
ANTIBODY SCREEN: NORMAL
HCT VFR BLD CALC: 21.3 % (ref 40–54)
HEMOGLOBIN: 6.7 G/DL (ref 13–18)

## 2021-10-01 PROCEDURE — 86900 BLOOD TYPING SEROLOGIC ABO: CPT

## 2021-10-01 PROCEDURE — 86920 COMPATIBILITY TEST SPIN: CPT

## 2021-10-01 PROCEDURE — 85014 HEMATOCRIT: CPT

## 2021-10-01 PROCEDURE — 86901 BLOOD TYPING SEROLOGIC RH(D): CPT

## 2021-10-01 PROCEDURE — P9016 RBC LEUKOCYTES REDUCED: HCPCS

## 2021-10-01 PROCEDURE — 36415 COLL VENOUS BLD VENIPUNCTURE: CPT

## 2021-10-01 PROCEDURE — 86850 RBC ANTIBODY SCREEN: CPT

## 2021-10-01 PROCEDURE — 85018 HEMOGLOBIN: CPT

## 2021-10-01 RX ORDER — SODIUM CHLORIDE 0.9 % (FLUSH) 0.9 %
5-40 SYRINGE (ML) INJECTION PRN
Status: CANCELLED | OUTPATIENT
Start: 2021-10-01

## 2021-10-01 RX ORDER — METHYLPREDNISOLONE SODIUM SUCCINATE 125 MG/2ML
125 INJECTION, POWDER, LYOPHILIZED, FOR SOLUTION INTRAMUSCULAR; INTRAVENOUS ONCE
Status: CANCELLED | OUTPATIENT
Start: 2021-10-01 | End: 2021-10-01

## 2021-10-01 RX ORDER — SODIUM CHLORIDE 9 MG/ML
INJECTION, SOLUTION INTRAVENOUS CONTINUOUS
Status: CANCELLED | OUTPATIENT
Start: 2021-10-01

## 2021-10-01 RX ORDER — SODIUM CHLORIDE 9 MG/ML
20 INJECTION, SOLUTION INTRAVENOUS CONTINUOUS
Status: CANCELLED | OUTPATIENT
Start: 2021-10-01

## 2021-10-01 RX ORDER — DIPHENHYDRAMINE HCL 25 MG
25 TABLET ORAL ONCE
Status: CANCELLED | OUTPATIENT
Start: 2021-10-01 | End: 2021-10-01

## 2021-10-01 RX ORDER — EPINEPHRINE 1 MG/ML
0.3 INJECTION, SOLUTION, CONCENTRATE INTRAVENOUS PRN
Status: CANCELLED | OUTPATIENT
Start: 2021-10-01

## 2021-10-01 RX ORDER — SODIUM CHLORIDE 9 MG/ML
25 INJECTION, SOLUTION INTRAVENOUS PRN
Status: CANCELLED | OUTPATIENT
Start: 2021-10-01

## 2021-10-01 RX ORDER — DIPHENHYDRAMINE HYDROCHLORIDE 50 MG/ML
50 INJECTION INTRAMUSCULAR; INTRAVENOUS ONCE
Status: CANCELLED | OUTPATIENT
Start: 2021-10-01 | End: 2021-10-01

## 2021-10-01 RX ORDER — ACETAMINOPHEN 325 MG/1
650 TABLET ORAL ONCE
Status: CANCELLED | OUTPATIENT
Start: 2021-10-01 | End: 2021-10-01

## 2021-10-01 RX ORDER — FUROSEMIDE 10 MG/ML
20 INJECTION INTRAMUSCULAR; INTRAVENOUS ONCE
Status: CANCELLED | OUTPATIENT
Start: 2021-10-01 | End: 2021-10-01

## 2021-10-01 NOTE — PROGRESS NOTES
Specialty Pharmacy Assessment    Thalidomide (Thalomid)  Dose: 100 mg  Frequency: Once a day  Indication: Multiple myeloma  Goals of therapy: Palliative  New Start: Yes  Prescription submitted to: Other  Other pharmacy: [facility]  REMS program: Yes  REMS authorization number: [id number]  Supportive care medications: Concomitant anticoagulant or antithrombotic prophylactic treatment prescribed (eg. aspirin, low molecular weight heparin)  Additional notes: Dr. Caballero put in an order to switch the patient from pomalidomide to thalidomide as part of his treatment plan for R/R multiple myeloma.  I reviewed the notes, labs, concomitant medications, and treatment guidelines. He has NKDA.  I sent a prescription to [facility] pharmacy for thalidomide 100 mg PO daily, #14, 0 refills.  He will return for labs on day 15 at which point a new Rx can be sent.    The patient is also starting oral melphalan as part of his plan, so I sent a prescription to [facility] for melphalan 10 mg (5 x 2 mg tablets) PO daily on days 1-4, #20, 3 refills.    The patient need an education appointment scheduled and at that time consent and CCA will be obtained.

## 2021-10-01 NOTE — PROGRESS NOTES
PROBLEM LIST:  Oncology/Hematology History   Multiple myeloma not having achieved remission (HCC)   8/18/2020 Initial Diagnosis    Multiple myeloma not having achieved remission (CMS/HCC)     8/20/2020 Cancer Staged    Staging form: Plasma Cell Myeloma And Plasma Cell Disorders, AJCC 8th Edition  - Clinical stage from 8/20/2020: RISS Stage III (Beta-2-microglobulin (mg/L): 16, Albumin (g/dL): 2.7, ISS: Stage III, High-risk cytogenetics: Present, LDH: Normal) - Signed by Merry Pelletier MD on 8/24/2020 8/24/2020 Molecular Testing    Molecular testing   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 1q21 gain     8/27/2020 - 1/7/2021 Chemotherapy    OP MULTIPLE MYELOMA Daratumumab / Bortezomib / Revlimid/Dexamethasone     12/7/2020 Transplant    Evaluation performed at the [facility] after induction chemotherapy with DRVD.  Patient was deemed not a candidate for transplant due to compromised ejection fraction - 40%     1/8/2021 - 4/1/2021 Chemotherapy    OP MULTIPLE MYELOMA Daratumumab / Lenalidomide / Dexamethasone     1/8/2021 -  Chemotherapy    OP SUPPORTIVE Denosumab (Xgeva) Q28D     3/19/2021 - 9/16/2021 Chemotherapy    OP MULTIPLE MYELOMA Daratumumab / Carfilzomib / Dexamethasone     9/15/2021 -  Chemotherapy    OP SUPPORTIVE HYDRATION + ANTIEMETICS     10/1/2021 - 10/1/2021 Chemotherapy    OP MULTIPLE MYELOMA Daratumumab / Pomalidomide / Dexamethasone      10/6/2021 -  Chemotherapy    OP MULTIPLE MYELOMA Melphalan / PredniSONE / Thalidomide         REASON FOR VISIT: Management of my myeloma    HISTORY OF PRESENT ILLNESS:   66 y.o.  male presents today for follow-up.  He underwent induction chemotherapy with Darzalek Velcade Revlimid and dexamethasone.  He completed 4 cycles with a very nice response.  He underwent a transplant evaluation.  Unfortunately due to suppressed ejection fraction of 40% he was deemed not a candidate for transplant.  He now has progressive disease.  We  tried to start him on daratumumab pomalidomide and dexamethasone.  However he was not able to get Pomalyst due to significant thrombocytopenia.  And now he has become increasingly weak in his performance status is declined.  He is profoundly anemic due to his bone marrow involvement.    Past medical history, social history and family history was reviewed and unchanged from prior visit.    Review of Systems:    Review of Systems   Constitutional: Positive for fatigue.   HENT:  Negative.    Eyes: Negative.    Respiratory: Negative.    Cardiovascular: Negative.    Gastrointestinal: Negative.    Endocrine: Negative.    Genitourinary: Negative.     Musculoskeletal: Negative.    Skin: Negative.    Neurological: Negative.    Hematological: Negative.    Psychiatric/Behavioral: Negative.             Medications:        Current Outpatient Medications:   •  acyclovir (ZOVIRAX) 400 MG tablet, Take 1 tablet by mouth 2 (Two) Times a Day. Take no more than 5 doses a day., Disp: 60 tablet, Rfl: 11  •  aspirin 81 MG EC tablet, Take 81 mg by mouth Daily., Disp: , Rfl:   •  atorvastatin (LIPITOR) 80 MG tablet, TAKE 1 TABLET BY MOUTH IN  THE EVENING, Disp: 90 tablet, Rfl: 3  •  dexamethasone (DECADRON) 4 MG tablet, Take 10 tabs on days 1,8,15,22. Take 1 tab on day 2,3,4,5,6,7,9,10,11,12,13,14,16,18,19,20,21,23,24,, Disp: 48 tablet, Rfl: 11  •  furosemide (LASIX) 20 MG tablet, TAKE 1 TAB DAY OF CHEMO AND DAY AFTER INFUSION, Disp: 30 tablet, Rfl: 2  •  gabapentin (NEURONTIN) 800 MG tablet, Take 800 mg by mouth 3 (Three) Times a Day., Disp: , Rfl:   •  glimepiride (AMARYL) 2 MG tablet, Take 1 tablet by mouth Daily., Disp: 90 tablet, Rfl: 3  •  glucose blood (OneTouch Ultra) test strip, 1 each by Other route 2 (Two) Times a Day. to check glucose     DX  E11.9, Disp: 100 each, Rfl: 11  •  Insulin Pen Needle 31G X 6 MM misc, 1 stick Daily., Disp: 90 each, Rfl: 0  •  lisinopril (PRINIVIL,ZESTRIL) 10 MG tablet, TAKE 1 TABLET BY MOUTH  DAILY,  Disp: 90 tablet, Rfl: 3  •  metFORMIN (GLUCOPHAGE) 1000 MG tablet, TAKE 1 TABLET BY MOUTH  TWICE DAILY WITH MEALS, Disp: 180 tablet, Rfl: 3  •  methadone (DOLOPHINE) 10 MG tablet, Take 30 mg by mouth Every 6 (Six) Hours As Needed for Moderate Pain ,, Disp: , Rfl:   •  metoprolol tartrate (LOPRESSOR) 25 MG tablet, TAKE 1 TABLET BY MOUTH  TWICE DAILY, Disp: 180 tablet, Rfl: 3  •  ondansetron (ZOFRAN) 8 MG tablet, TAKE 1 TABLET BY MOUTH  EVERY 8 HOURS AS NEEDED FOR NAUSEA AND VOMITING, Disp: 90 tablet, Rfl: 1  •  OneTouch Delica Lancets 33G misc, 1 application 3 (Three) Times a Day. Check blood sugar three times daily, E11.9, Disp: 100 each, Rfl: 11  •  pantoprazole (PROTONIX) 40 MG EC tablet, TAKE 1 TABLET BY MOUTH  DAILY, Disp: 90 tablet, Rfl: 3  •  sulfamethoxazole-trimethoprim (BACTRIM DS,SEPTRA DS) 800-160 MG per tablet, Take 1 tab 3 times a day on Monday Wednesday and Friday., Disp: 60 tablet, Rfl: 11  •  tiZANidine (ZANAFLEX) 4 MG tablet, Take 4 mg by mouth 3 (Three) Times a Day As Needed for Muscle Spasms., Disp: , Rfl:   •  warfarin (COUMADIN) 5 MG tablet, Take 5 mg by mouth Daily. One pill daily, Disp: , Rfl:   No current facility-administered medications for this visit.    Facility-Administered Medications Ordered in Other Visits:   •  furosemide (LASIX) injection 40 mg, 40 mg, Intravenous, Once, Merry Pelletier MD  •  sodium chloride 0.9 % infusion 500 mL, 500 mL, Intravenous, Once, Merry Pelletier MD    Pain Medications             aspirin 81 MG EC tablet Take 81 mg by mouth Daily.    dexamethasone (DECADRON) 4 MG tablet Take 10 tabs on days 1,8,15,22. Take 1 tab on day 2,3,4,5,6,7,9,10,11,12,13,14,16,18,19,20,21,23,24,    gabapentin (NEURONTIN) 800 MG tablet Take 800 mg by mouth 3 (Three) Times a Day.    methadone (DOLOPHINE) 10 MG tablet Take 30 mg by mouth Every 6 (Six) Hours As Needed for Moderate Pain ,    tiZANidine (ZANAFLEX) 4 MG tablet Take 4 mg by mouth 3 (Three) Times a Day As Needed  "for Muscle Spasms.             ALLERGIES:  No Known Allergies      Physical Exam    VITAL SIGNS:  BP 96/53   Pulse 74   Temp 97.5 °F (36.4 °C) (Temporal)   Resp 24   Ht 175.3 cm (69\")   Wt 93 kg (205 lb)   SpO2 97%   BMI 30.27 kg/m²     Wt Readings from Last 3 Encounters:   10/01/21 93 kg (205 lb)   09/29/21 87.5 kg (193 lb)   09/28/21 87.5 kg (193 lb)       Body mass index is 30.27 kg/m². Body surface area is 2.09 meters squared.    Pain Score    10/01/21 0958   PainSc: 0-No pain         Performance Status: 2    General: weak appearing, dyspenic  HEENT: sclera anicteric, neck is supple  Lymphatics: no cervical, supraclavicular, or axillary adenopathy  Cardiovascular: Regular  Lungs: clear to auscultation bilaterally  Abdomen: soft, nontender, nondistended.  No palpable organomegaly  Extremities: no lower extremity edema  Skin: no rashes, lesions, bruising, or petechiae  Msk:  Shows no weakness of the large muscle groups  Psych: Mood is stable    Lab Results   Component Value Date    FINALDX  09/27/2021     PERIPHERAL BLOOD SMEAR, BONE MARROW ASPIRATE, CLOT SECTION AND BIOPSY WITH FLOW CYTOMETRY, IMMUNOHISTOCHEMISTRY AND IN SITU HYBRIDIZATION STUDIES:  Markedly hypercellular marrow (90% cellularity) with CD56 positive lambda clonal plasma cell population showing significant cytologic atypia accounting for approximately 80-90% of total cellularity.  Mild reticulin fibrosis present (WHO score MF-1).  Limited residual trilineage hematopoiesis.  See comment.           RECENT LABS:    Lab Results   Component Value Date    HGB 6.0 (C) 10/01/2021    HCT 18.4 (C) 10/01/2021    MCV 99.5 (H) 10/01/2021    PLT 12 (C) 10/01/2021    WBC 4.52 10/01/2021    NEUTROABS 1.67 (L) 10/01/2021    LYMPHSABS 2.17 09/27/2021    MONOSABS 0.44 09/27/2021    EOSABS 0.03 09/27/2021    BASOSABS 0.00 09/27/2021       Lab Results   Component Value Date    GLUCOSE 176 (H) 10/01/2021    BUN 81 (H) 10/01/2021    CREATININE 6.20 (H) 10/01/2021 "     (L) 10/01/2021    K 5.4 (H) 10/01/2021    CL 95 (L) 10/01/2021    CO2 15.5 (L) 10/01/2021    CALCIUM 8.3 (L) 10/01/2021    PROTEINTOT 8.1 10/01/2021    ALBUMIN 3.40 (L) 10/01/2021    BILITOT 0.6 10/01/2021    ALKPHOS 50 10/01/2021    AST 30 10/01/2021    ALT 22 10/01/2021     Lab Results   Component Value Date    MSPIKE 2.0 08/12/2021    MSPIKE 1.5 07/09/2021    MSPIKE 1.2 06/11/2021    MSPIKE 1.4 3/05/2021    MSPIKE 1.0 2/5/2021    MSPIKE 0.9 01/08/2021    MSPIKE NOT QUANTIFIABLE 10/30/2020    MSPIKE 0.5 10/09/2020    MSPIKE 2.0 09/18/2020         MSPIKE         6.0              08/21/2020    Lab Results   Component Value Date    FREEKAPPAL 2.2 (L) 08/06/2021    FREEKAPPAL 2.3 (L) 07/09/2021    FREEKAPPAL 3.7 06/11/2021     Lab Results   Component Value Date    IGLFLC 570.4 (H) 08/06/2021    IGLFLC 454.0 (H) 07/09/2021    IGLFLC 379.5 (H) 06/11/2021         IGLFLC                                                         1173                                 08/21/2020        Assessment/Plan    1.  Multiple myeloma with renal dysfunction and pancytopenia.  His kidney function has significantly worsened over the last week.  I had a very raffaele discussion with him and his wife today regarding options going forward.  After discussion regarding ways to manage his symptoms and benefit of treatment.  I feel that palliative care and hospice would be a reasonable approach.  We will consider transfusion tomorrow and consider admission to hospice after that.  We will manage him palliatively.      3.  Renal dysfunction secondary to myeloma.  Much worse.  No dialysis.  Had a long discussion with him and his wife regarding no heroic measures to reverse his disease.    4.  History of multiple DVTs.  Patient on Coumadin    5.  CAD with depressed EF of 40%.  I am going to recheck his echo since we are having a hard time getting that done at .    6.  Severe thrombocytopenia related to his chemotherapy.      7.  Discussion  of end-of-life.  Had a long discussion with him and his wife regarding this.  He is currently DNR with no heroic measures to be performed in event of cardiac failure.      Total time of patient care on day of service including time prior to, face to face with patient, and following visit spent in reviewing records, lab results, imaging studies, discussion with patient, and documentation/charting was > 42 minutes.        Merry Pelletier MD  [facility]         Orders Placed This Encounter   Procedures   • DORA, PE & Free LT Chains, Ser   • CBC & Differential       10/1/2021

## 2021-10-01 NOTE — TELEPHONE ENCOUNTER
I called [facility] to do a referral to hospice.  I had to leave a voicemail for the hospice .  I left the patient information.

## 2021-10-02 ENCOUNTER — HOSPITAL ENCOUNTER (OUTPATIENT)
Facility: HOSPITAL | Age: 66
Setting detail: INFUSION SERIES
Discharge: HOME | End: 2021-10-04
Payer: MEDICARE

## 2021-10-02 VITALS
TEMPERATURE: 97.5 F | OXYGEN SATURATION: 99 % | HEART RATE: 63 BPM | DIASTOLIC BLOOD PRESSURE: 63 MMHG | BODY MASS INDEX: 31.02 KG/M2 | SYSTOLIC BLOOD PRESSURE: 107 MMHG | WEIGHT: 204 LBS

## 2021-10-02 VITALS — OXYGEN SATURATION: 98 % | SYSTOLIC BLOOD PRESSURE: 94 MMHG | HEART RATE: 61 BPM | DIASTOLIC BLOOD PRESSURE: 55 MMHG

## 2021-10-02 DIAGNOSIS — C90.00 MULTIPLE MYELOMA NOT HAVING ACHIEVED REMISSION (HCC): Primary | ICD-10-CM

## 2021-10-02 PROCEDURE — 96374 THER/PROPH/DIAG INJ IV PUSH: CPT

## 2021-10-02 PROCEDURE — P9021 RED BLOOD CELLS UNIT: HCPCS

## 2021-10-02 PROCEDURE — 6370000000 HC RX 637 (ALT 250 FOR IP)

## 2021-10-02 PROCEDURE — 36430 TRANSFUSION BLD/BLD COMPNT: CPT

## 2021-10-02 PROCEDURE — 6360000002 HC RX W HCPCS: Performed by: INTERNAL MEDICINE

## 2021-10-02 RX ORDER — EPINEPHRINE 1 MG/ML
0.3 INJECTION, SOLUTION, CONCENTRATE INTRAVENOUS PRN
OUTPATIENT
Start: 2021-10-02

## 2021-10-02 RX ORDER — DIPHENHYDRAMINE HYDROCHLORIDE 50 MG/ML
50 INJECTION INTRAMUSCULAR; INTRAVENOUS ONCE
OUTPATIENT
Start: 2021-10-02 | End: 2021-10-02

## 2021-10-02 RX ORDER — FUROSEMIDE 10 MG/ML
20 INJECTION INTRAMUSCULAR; INTRAVENOUS ONCE
Status: CANCELLED | OUTPATIENT
Start: 2021-10-02 | End: 2021-10-02

## 2021-10-02 RX ORDER — SODIUM CHLORIDE 9 MG/ML
INJECTION, SOLUTION INTRAVENOUS CONTINUOUS
OUTPATIENT
Start: 2021-10-02

## 2021-10-02 RX ORDER — DIPHENHYDRAMINE HCL 25 MG
TABLET ORAL
Status: COMPLETED
Start: 2021-10-02 | End: 2021-10-02

## 2021-10-02 RX ORDER — METHYLPREDNISOLONE SODIUM SUCCINATE 125 MG/2ML
125 INJECTION, POWDER, LYOPHILIZED, FOR SOLUTION INTRAMUSCULAR; INTRAVENOUS ONCE
OUTPATIENT
Start: 2021-10-02 | End: 2021-10-02

## 2021-10-02 RX ORDER — ACETAMINOPHEN 325 MG/1
650 TABLET ORAL ONCE
Status: CANCELLED | OUTPATIENT
Start: 2021-10-02 | End: 2021-10-02

## 2021-10-02 RX ORDER — SODIUM CHLORIDE 9 MG/ML
20 INJECTION, SOLUTION INTRAVENOUS CONTINUOUS
Status: DISCONTINUED | OUTPATIENT
Start: 2021-10-02 | End: 2021-10-05 | Stop reason: HOSPADM

## 2021-10-02 RX ORDER — DIPHENHYDRAMINE HCL 25 MG
25 TABLET ORAL ONCE
Status: COMPLETED | OUTPATIENT
Start: 2021-10-02 | End: 2021-10-02

## 2021-10-02 RX ORDER — ACETAMINOPHEN 325 MG/1
TABLET ORAL
Status: COMPLETED
Start: 2021-10-02 | End: 2021-10-02

## 2021-10-02 RX ORDER — ACETAMINOPHEN 325 MG/1
650 TABLET ORAL ONCE
Status: COMPLETED | OUTPATIENT
Start: 2021-10-02 | End: 2021-10-02

## 2021-10-02 RX ORDER — SODIUM CHLORIDE 9 MG/ML
20 INJECTION, SOLUTION INTRAVENOUS CONTINUOUS
Status: CANCELLED | OUTPATIENT
Start: 2021-10-02

## 2021-10-02 RX ORDER — SODIUM CHLORIDE 9 MG/ML
INJECTION, SOLUTION INTRAVENOUS CONTINUOUS
Status: DISCONTINUED | OUTPATIENT
Start: 2021-10-02 | End: 2021-10-05 | Stop reason: HOSPADM

## 2021-10-02 RX ORDER — METHYLPREDNISOLONE SODIUM SUCCINATE 125 MG/2ML
125 INJECTION, POWDER, LYOPHILIZED, FOR SOLUTION INTRAMUSCULAR; INTRAVENOUS ONCE
Status: DISCONTINUED | OUTPATIENT
Start: 2021-10-02 | End: 2021-10-04

## 2021-10-02 RX ORDER — SODIUM CHLORIDE 9 MG/ML
25 INJECTION, SOLUTION INTRAVENOUS PRN
Status: CANCELLED | OUTPATIENT
Start: 2021-10-02

## 2021-10-02 RX ORDER — DIPHENHYDRAMINE HYDROCHLORIDE 50 MG/ML
50 INJECTION INTRAMUSCULAR; INTRAVENOUS ONCE
Status: DISCONTINUED | OUTPATIENT
Start: 2021-10-02 | End: 2021-10-04

## 2021-10-02 RX ORDER — EPINEPHRINE 1 MG/ML
0.3 INJECTION, SOLUTION, CONCENTRATE INTRAVENOUS PRN
Status: DISCONTINUED | OUTPATIENT
Start: 2021-10-02 | End: 2021-10-04

## 2021-10-02 RX ORDER — SODIUM CHLORIDE 0.9 % (FLUSH) 0.9 %
5-40 SYRINGE (ML) INJECTION PRN
Status: CANCELLED | OUTPATIENT
Start: 2021-10-02

## 2021-10-02 RX ORDER — DIPHENHYDRAMINE HCL 25 MG
25 TABLET ORAL ONCE
Status: CANCELLED | OUTPATIENT
Start: 2021-10-02 | End: 2021-10-02

## 2021-10-02 RX ORDER — FUROSEMIDE 10 MG/ML
20 INJECTION INTRAMUSCULAR; INTRAVENOUS ONCE
Status: COMPLETED | OUTPATIENT
Start: 2021-10-02 | End: 2021-10-02

## 2021-10-02 RX ORDER — SODIUM CHLORIDE 9 MG/ML
25 INJECTION, SOLUTION INTRAVENOUS PRN
Status: ACTIVE | OUTPATIENT
Start: 2021-10-02 | End: 2021-10-03

## 2021-10-02 RX ORDER — SODIUM CHLORIDE 0.9 % (FLUSH) 0.9 %
5-40 SYRINGE (ML) INJECTION PRN
Status: ACTIVE | OUTPATIENT
Start: 2021-10-02 | End: 2021-10-03

## 2021-10-02 RX ADMIN — ACETAMINOPHEN 650 MG: 325 TABLET, FILM COATED ORAL at 10:33

## 2021-10-02 RX ADMIN — Medication 25 MG: at 10:33

## 2021-10-02 RX ADMIN — ACETAMINOPHEN 650 MG: 325 TABLET ORAL at 10:33

## 2021-10-02 RX ADMIN — DIPHENHYDRAMINE HYDROCHLORIDE 25 MG: 25 TABLET ORAL at 10:33

## 2021-10-02 RX ADMIN — FUROSEMIDE 20 MG: 10 INJECTION, SOLUTION INTRAMUSCULAR; INTRAVENOUS at 15:07

## 2021-10-02 ASSESSMENT — PAIN SCALES - GENERAL: PAINLEVEL_OUTOF10: 0

## 2021-10-02 NOTE — PROGRESS NOTES
#20 placed in RAC. Platelet administration began at this time. Pt is asleep in bed. BP is now 77/52. O2 is 97% on room air. HR is 57. Platelets signed off with Danika Kang RN.

## 2021-10-02 NOTE — PROGRESS NOTES
Blood administration initiated at this time. Verbal and written consent form signed prior to receiving blood product. Pt verbalizes understanding of side effects. Pt is able to communicate any discomfort and verbalizes understanding to do so. Pt was previously premedicated with tylenol and benadryl. Will remain at bedside with pt for 15 to monitor for reaction. Pt in no acute distress prior to initiation of blood product.

## 2021-10-02 NOTE — PROGRESS NOTES
Second blood transfusion ended at this time. Pt is asleep. Tolerated infusions well. Pt instructed to wait for 1 hour post transfusion prior to discharge to be monitored. RN will call EMS for transportation home.

## 2021-10-04 NOTE — TELEPHONE ENCOUNTER
Melissa with hospice called wants to know if Dr. Caballero will follow this patient and to pain and symptom management? Please call.

## 2021-10-04 NOTE — TELEPHONE ENCOUNTER
Patient wife called and said that they were not going to go with hospice bigg they wanted to mess with all his meds.  I talked with Dr. Caballero and he said to encourage them to accept hospice as they were able to get equipment and provide pain meds.  I talked with MsAlbert Varun for a long time and she finally agreed to call hospice back.  I told her that she would gain a lot of support with this.  She verbalized understanding.

## 2021-10-04 NOTE — TELEPHONE ENCOUNTER
Patient's wife called they need an order for a hospital bed, bedside commode, shower chair, electric wheelchair. Please call to discuss.

## 2021-10-04 NOTE — PROGRESS NOTES
Called [facility] to cancel the thalidomide and called [facility] to cancel the oral melphalan.

## 2021-10-06 ENCOUNTER — TELEPHONE (OUTPATIENT)
Dept: ONCOLOGY | Facility: OTHER | Age: 66
End: 2021-10-06

## 2021-10-06 ENCOUNTER — TELEPHONE (OUTPATIENT)
Dept: INFUSION THERAPY | Facility: HOSPITAL | Age: 66
End: 2021-10-06

## 2021-10-06 ENCOUNTER — TELEPHONE (OUTPATIENT)
Dept: ONCOLOGY | Facility: CLINIC | Age: 66
End: 2021-10-06

## 2021-10-06 NOTE — TELEPHONE ENCOUNTER
VALENTE FROM [employer] CALLED TO INQUIRE ABOUT MEDICATION REGIMENT. INFORMED THAT PATIENT IS  AS OF 10/5/21.

## 2021-10-06 NOTE — TELEPHONE ENCOUNTER
Upon further review, it appears there has been discussion of patient being admitted to hospice services in last few days.  Through secure chat, contacted Maria L Haynes at Dr. Caballero's office to confirm that the biopsy should be canceled for tomorrow.

## 2021-10-07 ENCOUNTER — HOSPITAL ENCOUNTER (OUTPATIENT)
Dept: CT IMAGING | Facility: HOSPITAL | Age: 66
End: 2021-10-07

## 2021-10-07 ENCOUNTER — APPOINTMENT (OUTPATIENT)
Dept: CARDIOLOGY | Facility: HOSPITAL | Age: 66
End: 2021-10-07

## 2021-10-07 LAB
CYTO UR: NORMAL
LAB AP ASPIRATE SMEAR: NORMAL
LAB AP CASE REPORT: NORMAL
LAB AP CBC AND DIFFERENTIAL: NORMAL
LAB AP CLINICAL INFORMATION: NORMAL
LAB AP CLOT SECTION: NORMAL
LAB AP CORE BIOPSY: NORMAL
LAB AP CYTOGENETICS REPORT,ADDENDUM: NORMAL
LAB AP DIAGNOSIS COMMENT: NORMAL
LAB AP FLOW CYTOMETRY SUMMARY: NORMAL
LAB AP INTEGRATED ONCOLOGY, ADDENDUM: NORMAL
LAB AP SPECIAL STAINS: NORMAL
PATH REPORT.FINAL DX SPEC: NORMAL
PATH REPORT.GROSS SPEC: NORMAL

## 2021-11-12 ENCOUNTER — TELEPHONE (OUTPATIENT)
Dept: ONCOLOGY | Facility: CLINIC | Age: 66
End: 2021-11-12

## 2021-11-12 NOTE — TELEPHONE ENCOUNTER
Returned call to patient's wife. Discussing with her that the  Home had called and that they stated that the death certificate had been sent to [facility] and for Dr. Caballero to sign. Informing patient's wife that the Death Certificate still hadn't came to us so the  Home will resend. Informing her that this will make thier 3rd time. Discussing with patient's wife that if we don't receive it by Wednesday. Nurse will request a paper copy of death certificate. Nurse discussing with patient's wife why patient had progressed so fast. Discussing with her that patient Myeloma had attacked his kidney and then also heart. Informing her that he just kept progressing. Patient's wife stating she understood the process.

## 2021-11-12 NOTE — TELEPHONE ENCOUNTER
Called the  home back. Informed by  Yrn that we don't have anything pending for Dr. Caballero. Albert Yrn stating he has sent it twice. Informing  home that nurse will ask manager if she knows what else can be done.

## 2021-11-12 NOTE — TELEPHONE ENCOUNTER
----- Message from LEV Cuevas sent at 11/12/2021  9:09 AM EST -----  Contact: [phone]  PATIENTS WIFE - LUIS DOSS CALLED THIS AM AND WAS WANTING A COPY OF MR. ESPINOZA DEATH CERTIFICATE.  COULD YOU PLEASE SEND A COPY TO HER.    THANKS    CONTACT NUMBER: [phone]

## 2021-11-12 NOTE — TELEPHONE ENCOUNTER
----- Message from LEV Cuevas sent at 11/12/2021  9:09 AM EST -----  Contact: [phone]  PATIENTS WIFE - LUIS ODSS CALLED THIS AM AND WAS WANTING A COPY OF MR. ESPINOZA DEATH CERTIFICATE.  COULD YOU PLEASE SEND A COPY TO HER.    THANKS    CONTACT NUMBER: [phone]

## 2021-11-12 NOTE — TELEPHONE ENCOUNTER
Caller: CHAKA FERNANDA    Relationship: [facility]  HOME IN [place]    Best call back number: [phone]    What is the best time to reach you:     ANYTIME     Who are you requesting to speak with (clinical staff, provider,  specific staff member): DR ADLER OR NURSE    Do you know the name of the person who called: CHAKA    What was the call regarding:   HAD SENT DEATH CERTIFICATE TO BE SIGNED AND HAS NEVER BEEN SIGNED OR SENT BACK FOLLOWING UP ON THIS     Do you require a callback: YES

## 2021-11-12 NOTE — TELEPHONE ENCOUNTER
Called patient's wife informing her that  didn't sign the treatment plan. Informing her of [employer] phone number [phone].

## 2021-11-17 ENCOUNTER — TELEPHONE (OUTPATIENT)
Dept: ONCOLOGY | Facility: CLINIC | Age: 66
End: 2021-11-17

## 2021-11-17 NOTE — TELEPHONE ENCOUNTER
Returned call to patient's wife, after  went on line looking for patient's death certificate on the vital statistics site. Informing patient's wife that certificate is still not in Dr. Caballeor que to sign. Nurse asking patient's wife if she could could call and follow up with Dashawn to see if she can find out anything. Patient's wife stating she will call and follow up.

## 2021-11-17 NOTE — TELEPHONE ENCOUNTER
Caller: Nunu Bond    Relationship: Emergency Contact    Best call back number: [phone] MAY CALL ANYTIME AND LEAVE VM.     Who are you requesting to speak with (clinical staff, provider,  specific staff member): JERZY NEAL CLINICAL STAFF      What was the call regarding: PATIENT'S SPOUSE IS REPORTING THAT SHE STILL HAS NOT RECEIVED THE DEATH CERT.    Do you require a callback: YES ASAP.

## 2022-05-09 NOTE — PROGRESS NOTES
Subjective   Gene Bond is a 63 y.o. male    Chief Complaint    High blood pressure  Elevated cholesterol  Diabetes mellitus  Heart disease  COPD    History of Present Illness  Patient presents today for recheck related to hypertension, hyperlipidemia, diabetes mellitus, coronary artery disease and COPD.  Medication refills are up-to-date at this point.  Patient continues to see pain management on [employer] on a regular basis every 2 months.  He reports that he has not had any chest pain, exertional dyspnea, orthopnea or edema since his last visit here.  Claims to be compliant with his medications and diet.  He gets very limited exercise due to his chronic pain issues.  He is due to be scheduled for Medicare wellness which we will try to get in a morning session so he can be fasting and get his blood work done at the same time.  He has no acute complaints today.  He recently had back surgery and a bone spur removed from his foot at [facility].  His surgery was done by Dr. Canchola.(Data deficit)    The following portions of the patient's history were reviewed and updated as appropriate: allergies, current medications, past social history and problem list    Review of Systems   Constitutional: Negative.  Negative for appetite change, chills, diaphoresis, fatigue, fever and unexpected weight change.   HENT: Negative.    Eyes: Negative for visual disturbance.   Respiratory: Negative.  Negative for cough, chest tightness, shortness of breath and wheezing.    Cardiovascular: Negative for chest pain, palpitations and leg swelling.   Gastrointestinal: Negative.  Negative for abdominal pain, diarrhea, nausea and vomiting.   Endocrine: Negative for polydipsia, polyphagia and polyuria.   Genitourinary: Negative.  Negative for dysuria, frequency and urgency.   Musculoskeletal: Positive for back pain. Negative for arthralgias, gait problem and myalgias.   Skin: Negative for color change and rash.   Neurological:  Negative for dizziness, tremors, syncope, weakness, light-headedness, numbness and headaches.   Hematological: Negative for adenopathy. Does not bruise/bleed easily.   Psychiatric/Behavioral: Negative for behavioral problems and dysphoric mood. The patient is not nervous/anxious.        Objective     Vitals:    07/17/19 1343   BP: 136/82   Pulse: 58   Resp: 16   SpO2: 96%       Physical Exam   Constitutional: He is oriented to person, place, and time. He appears well-developed and well-nourished.   HENT:   Head: Normocephalic.   Mouth/Throat: Oropharynx is clear and moist.   Eyes: Conjunctivae are normal. Pupils are equal, round, and reactive to light.   Neck: Neck supple. No JVD present. No thyromegaly present.   Cardiovascular: Normal rate, regular rhythm, normal heart sounds, intact distal pulses and normal pulses.   No murmur heard.  Pulmonary/Chest: Effort normal. No respiratory distress. He has decreased breath sounds.   Abdominal: Soft. Bowel sounds are normal. There is no hepatosplenomegaly. There is no tenderness.   Musculoskeletal: He exhibits no edema.        Lumbar back: He exhibits decreased range of motion, tenderness, bony tenderness and pain. He exhibits no swelling, no deformity and no spasm.   Lymphadenopathy:     He has no cervical adenopathy.   Neurological: He is alert and oriented to person, place, and time. He has normal reflexes. No sensory deficit.   Skin: Skin is warm and dry. No rash noted. He is not diaphoretic.   Psychiatric: He has a normal mood and affect. His behavior is normal.   Nursing note and vitals reviewed.      Assessment/Plan   Problem List Items Addressed This Visit        Cardiovascular and Mediastinum    Mixed hyperlipidemia    HTN (hypertension) - Primary    Coronary artery disease involving native heart without angina pectoris       Respiratory    COPD (chronic obstructive pulmonary disease) (CMS/[facility])       Endocrine    DM (diabetes mellitus) (CMS/[facility])      Other Visit  Diagnoses     Chronic pain syndrome            Continue current medications without changes.  Refill when indicated.  Schedule Medicare wellness visit.  Laboratory follow-up at wellness visit.  He is reminded to come fasting to his office appointment.        walk with cane, came in wheelchair

## 2022-05-19 NOTE — PROGRESS NOTES
Reviewed oncology office note, dated 2/5/2021, plan for continuation of lenalidomide/marilou/dex given high risk disease. Released new script for lenalidomide 25mg PO daily Days 1-21 then 7 days off, #21 with 0 RF to [facility].   Tried to call patient but line was busy.

## 2022-08-07 NOTE — PROGRESS NOTES
Goal Outcome Evaluation:  Plan of Care Reviewed With: patient           Outcome Evaluation: PT CONTINUES TO HAVE LIQUID STOOLS. HAVE ATTEMPTED TO COLLECT URINE BUT PT IS UNABLE TO URINATE WITHOUT BOWELS MOVING. WILL CONTINUE TO ATTEMPT TO COLLECT. PD FLUID SENT TO LAB.   Subjective   Gene Bond is a 64 y.o. male    Chief Complaint    Frequent bruising  Fatigue  Edema  Warfarin therapy    History of Present Illness  Patient presents today concerned about increased frequency of bruising and generalized fatigue.  He is also had some moderate bilateral lower extremity edema.  He denies any chest pain or shortness of breath.  He is on chronic warfarin therapy for recurrent deep vein thromboses.  He has a history of coronary artery disease, chronic obstructive pulmonary disease, hypertension, hyperlipidemia, diabetes mellitus, and degenerative disc disease.  Review of patient's medical records show that his most recent CBC was done in June of this year at [facility] where his white blood count was 3400 hemoglobin 9.1 and platelets 65,000.  Prior to that in October his white blood count was 6.34 hemoglobin 10.9 and platelets 129,000.  He reports that he has had 2 normal colonoscopies in the last 10 years.  He denies any melena or hematochezia.  He denies any hemoptysis or hematemesis.  He does home PT/INR testing weekly.  His last INR was July 30 and was 2.7.    The following portions of the patient's history were reviewed and updated as appropriate: allergies, current medications, past social history and problem list    Review of Systems   Constitutional: Negative for chills, fatigue and fever.   Respiratory: Positive for shortness of breath. Negative for cough, chest tightness and wheezing.    Cardiovascular: Positive for leg swelling. Negative for chest pain and palpitations.   Gastrointestinal: Negative for abdominal pain, blood in stool, constipation, diarrhea, nausea and vomiting.   Endocrine: Negative for polydipsia, polyphagia and polyuria.   Genitourinary: Negative for dysuria, frequency and urgency.   Musculoskeletal: Negative for arthralgias, back pain and myalgias.   Skin: Negative for color change and rash.   Neurological: Negative for weakness  and headaches.   Hematological: Negative for adenopathy. Bruises/bleeds easily.       Objective     Vitals:    08/05/20 0923   BP: 158/82   Pulse: 69   Temp: 97.3 °F (36.3 °C)   SpO2: 97%       Physical Exam   Constitutional: He is oriented to person, place, and time. He appears well-developed and well-nourished.   HENT:   Head: Normocephalic.   Mouth/Throat: Oropharynx is clear and moist.   Eyes: Pupils are equal, round, and reactive to light. Conjunctivae are normal.   Neck: Neck supple. No thyromegaly present.   Cardiovascular: Normal rate and regular rhythm.   Pulmonary/Chest: Effort normal.   Abdominal: Soft. Bowel sounds are normal. There is no tenderness.   Musculoskeletal: He exhibits edema.   Trace to 1+ pretibial edema bilaterally.   Lymphadenopathy:     He has no cervical adenopathy.   Neurological: He is alert and oriented to person, place, and time.   Skin: Skin is warm and dry. No rash noted.   Multiple ecchymoses on bilateral upper extremities.   Psychiatric: He has a normal mood and affect. His behavior is normal.   Nursing note and vitals reviewed.      Assessment/Plan   Problem List Items Addressed This Visit        Cardiovascular and Mediastinum    Mixed hyperlipidemia    HTN (hypertension)    Coronary artery disease involving native heart without angina pectoris       Respiratory    COPD (chronic obstructive pulmonary disease) (CMS/HCC)       Endocrine    DM (diabetes mellitus) (CMS/HCC)      Other Visit Diagnoses     Pancytopenia (CMS/HCC)    -  Primary    Relevant Orders    CBC & Differential    Ambulatory Referral to Hematology / Oncology    Chronic pain syndrome

## 2024-03-18 NOTE — TELEPHONE ENCOUNTER
Caller: torres    Relationship to patient: wife    Best call back number: [phone]    Wife states she the pt had a lab done and saw no cancer. Wife would like to make a sooner appt than 1/15/20 with dr. Caballero. Wife is concerned about if the cancer will come back or not and would like a sooner appt.   Patient .1 aPTT 126.8 Lactate 4.2 and then Lactate 3.6 ( run 2 times)

## 2024-07-23 ENCOUNTER — TELEPHONE (OUTPATIENT)
Dept: ONCOLOGY | Facility: CLINIC | Age: 69
End: 2024-07-23

## 2024-07-23 NOTE — TELEPHONE ENCOUNTER
Caller:  FLORES CHANDLER     Relationship:  WITH [facility]     Best call back number: [phone]    What is the best time to reach you: 9AM-4PM    Who are you requesting to speak with (clinical staff, provider,  specific staff member): DR GRANT        What was the call regarding: DEATH OF TESSIE DOSS WHO PASSED IN OCTOBER 6TH 2021      THE DAUGHTER BELIEVES THE  DEATH WAS SUSPICIOUS AND HAVE SOME QUESTIONS ABOUT PATIENTS END OF LIFE CARE

## (undated) DEVICE — ANTIBACTERIAL UNDYED BRAIDED (POLYGLACTIN 910), SYNTHETIC ABSORBABLE SUTURE: Brand: COATED VICRYL

## (undated) DEVICE — 3M™ STERI-DRAPE™ INSTRUMENT POUCH 1018: Brand: STERI-DRAPE™

## (undated) DEVICE — UNDYED BRAIDED (POLYGLACTIN 910), SYNTHETIC ABSORBABLE SUTURE: Brand: COATED VICRYL

## (undated) DEVICE — SUCTION CANISTER, 2500CC, RIGID: Brand: DEROYAL

## (undated) DEVICE — DRN PENRS 1/4X18IN LTX

## (undated) DEVICE — INTENDED FOR TISSUE SEPARATION, AND OTHER PROCEDURES THAT REQUIRE A SHARP SURGICAL BLADE TO PUNCTURE OR CUT.: Brand: BARD-PARKER ® STAINLESS STEEL BLADES

## (undated) DEVICE — KITTNER SPONGE: Brand: DEROYAL

## (undated) DEVICE — EXTENSION TAB: Brand: DEROYAL

## (undated) DEVICE — 3M™ STERI-STRIP™ REINFORCED ADHESIVE SKIN CLOSURES, R1547, 1/2 IN X 4 IN (12 MM X 100 MM), 6 STRIPS/ENVELOPE: Brand: 3M™ STERI-STRIP™

## (undated) DEVICE — 2963 MEDIPORE SOFT CLOTH TAPE 3 IN X 10 YD 12 RLS/CS: Brand: 3M™ MEDIPORE™

## (undated) DEVICE — PIN SFTY LG LF

## (undated) DEVICE — GLV SURG TRIUMPH ORTHO W/ALOE PF LTX 8 STRL

## (undated) DEVICE — GOWN,PREVENTION PLUS,XXLARGE,STERILE: Brand: MEDLINE

## (undated) DEVICE — DIFFUSER: Brand: CORE, MAESTRO

## (undated) DEVICE — GLV SURG SENSICARE MICRO PF LF 8 STRL

## (undated) DEVICE — SPNG GZ STRL 2S 4X4 12PLY

## (undated) DEVICE — PAD ARMBRD SURG CONVOL 7.5X20X2IN

## (undated) DEVICE — ADHS LIQ MASTISOL 2/3ML

## (undated) DEVICE — LO CONTOUR COLLAR: Brand: DEROYAL

## (undated) DEVICE — OIL CARTRIDGE: Brand: CORE, MAESTRO

## (undated) DEVICE — MEDI-VAC YANKAUER SUCTION HANDLE W/BULBOUS TIP: Brand: CARDINAL HEALTH

## (undated) DEVICE — APPL CHLORAPREP W/TINT 26ML BLU

## (undated) DEVICE — MEDI-VAC NON-CONDUCTIVE SUCTION TUBING: Brand: CARDINAL HEALTH

## (undated) DEVICE — GAUZE,SPONGE,4"X4",16PLY,XRAY,STRL,LF: Brand: MEDLINE

## (undated) DEVICE — PK SPINE ORTHO 10

## (undated) DEVICE — CANNULA,OXY,ADULT,SUPERSOFT,W/7'TUB,UC: Brand: MEDLINE

## (undated) DEVICE — 1010 S-DRAPE TOWEL DRAPE 10/BX: Brand: STERI-DRAPE™

## (undated) DEVICE — GOWN,REINF,POLY,ECL,PP SLV,XL: Brand: MEDLINE

## (undated) DEVICE — CVR HNDL LT SURG ACCSSRY BLU STRL

## (undated) DEVICE — DRSNG TELFA PAD NONADH STR 1S 3X8IN

## (undated) DEVICE — SKYLINE ANTERIOR CERVICAL PLATE SYSTEM DRILL 2.2 X 12MM: Brand: SKYLINE